# Patient Record
Sex: FEMALE | Race: WHITE
[De-identification: names, ages, dates, MRNs, and addresses within clinical notes are randomized per-mention and may not be internally consistent; named-entity substitution may affect disease eponyms.]

---

## 2019-12-08 ENCOUNTER — HOSPITAL ENCOUNTER (EMERGENCY)
Dept: HOSPITAL 97 - ER | Age: 19
Discharge: HOME | End: 2019-12-08
Payer: SELF-PAY

## 2019-12-08 VITALS — DIASTOLIC BLOOD PRESSURE: 78 MMHG | TEMPERATURE: 98.2 F | OXYGEN SATURATION: 100 % | SYSTOLIC BLOOD PRESSURE: 112 MMHG

## 2019-12-08 DIAGNOSIS — S80.861A: Primary | ICD-10-CM

## 2019-12-08 PROCEDURE — 99281 EMR DPT VST MAYX REQ PHY/QHP: CPT

## 2019-12-08 NOTE — EDPHYS
Physician Documentation                                                                           

 [facility]                                                                 

Name: Rose Villar                                                                              

Age: 19 yrs                                                                                       

Sex: Female                                                                                       

: 2000                                                                                   

MRN: [id number]                                                                                   

Arrival Date: 2019                                                                          

Time: 15:38                                                                                       

Account#: [id number]                                                                            

Bed DIS1                                                                                          

Private MD:                                                                                       

ED Physician Da Coronado                                                                       

HPI:                                                                                              

                                                                                             

16:26 This 19 yrs old  Female presents to ER via Ambulatory with complaints of       pm1 

      Insect Bite.                                                                                

16:26 The patient's rash thought to be caused by insect bites. The rash is located on the     pm1 

      right calf. The rash can be described as raised. Onset: The symptoms/episode                

      began/occurred just prior to arrival. Associated signs and symptoms: Pertinent              

      positives: burning sensation, Pain Pertinent negatives: difficulty breathing, fever,        

      itching, nausea, swelling of lips, swelling of throat, swelling of tongue, vomiting,        

      wheezing. Severity of symptoms: in the emergency department the symptoms have improved      

      markedly. Treatment given at home: None. The patient has not experienced similar            

      symptoms in the past. It is unknown whether or not the patient has recently seen a          

      physician. Presenting with friend that was also bitten by asp.                              

                                                                                                  

OB/GYN:                                                                                           

16:00 LMP N/A -                                                                               iw  

                                                                                                  

Historical:                                                                                       

- Allergies:                                                                                      

15:53 No Known Allergies;                                                                     sg  

- Home Meds:                                                                                      

15:53 None [Active];                                                                          sg  

- PMHx:                                                                                           

15:53 None;                                                                                   sg  

- PSHx:                                                                                           

15:53 None;                                                                                   sg  

                                                                                                  

- Immunization history:: Adult Immunizations up to date.                                          

- Social history:: Smoking status: Patient/guardian denies using tobacco.                         

- Ebola Screening: : Patient negative for fever greater than or equal to 101.5 degrees            

  Fahrenheit, and additional compatible Ebola Virus Disease symptoms Patient denies               

  exposure to infectious person Patient denies travel to an Ebola-affected area in the            

  21 days before illness onset No symptoms or risks identified at this time.                      

                                                                                                  

                                                                                                  

ROS:                                                                                              

16:26 Constitutional: Negative for fever, chills, and weight loss, Eyes: Negative for injury, pm1 

      pain, redness, and discharge, ENT: Negative for injury, pain, and discharge, Neck:          

      Negative for injury, pain, and swelling, Cardiovascular: Negative for chest pain,           

      palpitations, and edema, Respiratory: Negative for shortness of breath, cough,              

      wheezing, and pleuritic chest pain, Abdomen/GI: Negative for abdominal pain, nausea,        

      vomiting, diarrhea, and constipation, Back: Negative for injury and pain, MS/Extremity:     

      Negative for injury and deformity.                                                          

16:26 Neuro: Negative for headache, weakness, numbness, tingling, and seizure.                    

16:26 Skin: Positive for rash, of the right calf, Negative for cellulitis.                        

                                                                                                  

Exam:                                                                                             

16:26 Constitutional:  This is a well developed, well nourished patient who is awake, alert,  pm1 

      and in no acute distress. Head/Face:  Normocephalic, atraumatic. Eyes:  Pupils equal        

      round and reactive to light, extra-ocular motions intact.  Lids and lashes normal.          

      Conjunctiva and sclera are non-icteric and not injected.  Cornea within normal limits.      

      Periorbital areas with no swelling, redness, or edema. ENT:  Nares patent. No nasal         

      discharge, no septal abnormalities noted.  Tympanic membranes are normal and external       

      auditory canals are clear.  Oropharynx with no redness, swelling, or masses, exudates,      

      or evidence of obstruction, uvula midline.  Mucous membranes moist. Neck:  Trachea          

      midline, no thyromegaly or masses palpated, and no cervical lymphadenopathy.  Supple,       

      full range of motion without nuchal rigidity, or vertebral point tenderness.  No            

      Meningismus. Chest/axilla:  Normal chest wall appearance and motion.  Nontender with no     

      deformity.  No lesions are appreciated. Cardiovascular:  Regular rate and rhythm with a     

      normal S1 and S2.  No gallops, murmurs, or rubs.  Normal PMI, no JVD.  No pulse             

      deficits. Respiratory:  Lungs have equal breath sounds bilaterally, clear to                

      auscultation and percussion.  No rales, rhonchi or wheezes noted.  No increased work of     

      breathing, no retractions or nasal flaring. Back:  No spinal tenderness.  No                

      costovertebral tenderness.  Full range of motion.                                           

16:26 MS/ Extremity:  Pulses equal, no cyanosis.  Neurovascular intact.  Full, normal range       

      of motion.                                                                                  

16:26 Skin: Appearance: normal except for affected area, lesion(s), probable a wheal is           

      noted, located on the  right calf.                                                          

16:26 Neuro: Orientation: is normal, Motor: is normal, moves all fours.                           

                                                                                                  

Vital Signs:                                                                                      

15:53  / 78; Pulse 78; Resp 18; Temp 98.2; Pulse Ox 100% ; Pain /10;                   sg  

                                                                                                  

MDM:                                                                                              

16:22 Patient medically screened.                                                             pm1 

16:26 Data reviewed: vital signs. Data interpreted: Pulse oximetry: on room air is 100 %.     pm1 

      Interpretation: normal. Counseling: I had a detailed discussion with the patient and/or     

      guardian regarding: the historical points, exam findings, and any diagnostic results        

      supporting the discharge/admit diagnosis, to return to the emergency department if          

      symptoms worsen or persist or if there are any questions or concerns that arise at home.    

                                                                                                  

Administered Medications:                                                                         

No medications were administered                                                                  

                                                                                                  

                                                                                                  

Disposition:                                                                                      

                                                                                             

09:07 Co-signature as Attending Physician, Da Coronado MD I agree with the assessment and   kdr 

      plan of care.                                                                               

                                                                                                  

Disposition:                                                                                      

19 16:27 Discharged to Home. Impression: Insect bite (nonvenomous), right lower leg.        

- Condition is Stable.                                                                            

- Discharge Instructions: Insect Bite.                                                            

                                                                                                  

- Medication Reconciliation Form, Thank You Letter, Antibiotic Education, Prescription            

  Opioid Use form.                                                                                

- Follow up: Emergency Department; When: As needed; Reason: Worsening of condition.               

  Follow up: Private Physician; When: 2 - 3 days; Reason: Recheck today's complaints,             

  Continuance of care, Re-evaluation by your physician.                                           

- Problem is new.                                                                                 

- Symptoms have improved.                                                                         

                                                                                                  

                                                                                                  

                                                                                                  

Signatures:                                                                                       

Bob Manzano RN RN   sg                                                   

Da Coronado MD MD   kdr                                                  

Claudia López RN                     RN   iw                                                   

Jarett Pelletier NP                    NP   pm1                                                  

                                                                                                  

Corrections: (The following items were deleted from the chart)                                    

                                                                                             

16:33 16:27 2019 16:27 Discharged to Home. Impression: Insect bite (nonvenomous), right iw  

      lower leg. Condition is Stable. Forms are Medication Reconciliation Form, Thank You         

      Letter, Antibiotic Education, Prescription Opioid Use. Follow up: Emergency Department;     

      When: As needed; Reason: Worsening of condition. Follow up: Private Physician; When: 2      

      - 3 days; Reason: Recheck today's complaints, Continuance of care, Re-evaluation by         

      your physician. Problem is new. Symptoms have improved. pm1                                 

                                                                                                  

**************************************************************************************************

## 2019-12-08 NOTE — ER
Nurse's Notes                                                                                     

 [facility]                                                                 

Name: Rose Villar                                                                              

Age: 19 yrs                                                                                       

Sex: Female                                                                                       

: 2000                                                                                   

MRN: [id number]                                                                                   

Arrival Date: 2019                                                                          

Time: 15:38                                                                                       

Account#: [id number]                                                                            

Bed DIS1                                                                                          

Private MD:                                                                                       

Diagnosis: Insect bite (nonvenomous), right lower leg                                             

                                                                                                  

Presentation:                                                                                     

                                                                                             

15:38 Presenting complaint: Patient states: I think I was stung by an Asp too, I have pain    sg  

      and burning but I did not see the bug. Transition of care: patient was not received         

      from another setting of care. Onset of symptoms was 2019. Risk Assessment:     

      Do you want to hurt yourself or someone else? Patient reports no desire to harm self or     

      others. Initial Sepsis Screen: Does the patient meet any 2 criteria? No. Patient's          

      initial sepsis screen is negative. Does the patient have a suspected source of              

      infection? No. Patient's initial sepsis screen is negative. Care prior to arrival: None.    

15:38 Method Of Arrival: Ambulatory                                                           sg  

15:38 Acuity: BETTY 4                                                                           sg  

15:54 Note pain to right calf.                                                                sg  

                                                                                                  

OB/GYN:                                                                                           

16:00 LMP N/A -                                                                               iw  

                                                                                                  

Historical:                                                                                       

- Allergies:                                                                                      

15:53 No Known Allergies;                                                                     sg  

- Home Meds:                                                                                      

15:53 None [Active];                                                                          sg  

- PMHx:                                                                                           

15:53 None;                                                                                   sg  

- PSHx:                                                                                           

15:53 None;                                                                                   sg  

                                                                                                  

- Immunization history:: Adult Immunizations up to date.                                          

- Social history:: Smoking status: Patient/guardian denies using tobacco.                         

- Ebola Screening: : Patient negative for fever greater than or equal to 101.5 degrees            

  Fahrenheit, and additional compatible Ebola Virus Disease symptoms Patient denies               

  exposure to infectious person Patient denies travel to an Ebola-affected area in the            

  21 days before illness onset No symptoms or risks identified at this time.                      

                                                                                                  

                                                                                                  

Screenin:10 Abuse screen: Denies threats or abuse. Denies injuries from another. Nutritional        iw  

      screening: No deficits noted. Tuberculosis screening: No symptoms or risk factors           

      identified. Fall Risk None identified.                                                      

                                                                                                  

Assessment:                                                                                       

16:10 General: Appears in no apparent distress. Behavior is calm, cooperative. Pain: Denies   iw  

      pain. Neuro: Level of Consciousness is awake, alert, obeys commands, Oriented to            

      person, place, time, situation, Moves all extremities. Full function. Cardiovascular:       

      Patient's skin is warm and dry. Respiratory: Respiratory effort is even, unlabored,         

      Respiratory pattern is regular, symmetrical. Derm: Skin is intact, is healthy with good     

      turgor, Skin is pink, warm \T\ dry. normal. Musculoskeletal: Range of motion: intact in     

      all extremities.                                                                            

                                                                                                  

Vital Signs:                                                                                      

15:53  / 78; Pulse 78; Resp 18; Temp 98.2; Pulse Ox 100% ; Pain 4/10;                   sg  

                                                                                                  

ED Course:                                                                                        

15:38 Patient arrived in ED.                                                                  mr  

15:39 Triage completed.                                                                       sg  

15:39 Arm band placed on.                                                                     sg  

15:40 Patient has correct armband on for positive identification.                             iw  

15:51 Claudia López RN is Primary Nurse.                                                     

15:53 Wicho Orosco PA is PHCP.                                                              [facility] 

15:53 Da Coronado MD is Attending Physician.                                              [facility] 

16:10 No provider procedures requiring assistance completed. Patient did not have IV access   iw  

      during this emergency room visit.                                                           

16:11 PHCP role handed off by Wicho Orosco PA                                               pm1 

16:11 Jarett Pelletier NP is PHCP.                                                           pm1 

                                                                                                  

Administered Medications:                                                                         

No medications were administered                                                                  

                                                                                                  

                                                                                                  

Outcome:                                                                                          

16:27 Discharge ordered by MD.                                                                pm1 

16:32 Discharged to home ambulatory, with family.                                             iw  

16:32 Condition: good                                                                             

16:32 Discharge instructions given to patient, Instructed on discharge instructions, follow       

      up and referral plans. Demonstrated understanding of instructions, follow-up care.          

16:33 Patient left the ED.                                                                    iw  

                                                                                                  

Signatures:                                                                                       

Bob Manzano RN RN                                                      

Wicho Orosco PA PA   [facility]                                                  

LoraMeghan                                                                                    

Claudia López RN RN                                                      

Jarett Pelletier NP                    NP   pm1                                                  

                                                                                                  

**************************************************************************************************

## 2020-02-05 ENCOUNTER — HOSPITAL ENCOUNTER (EMERGENCY)
Dept: HOSPITAL 97 - ER | Age: 20
LOS: 1 days | Discharge: HOME | End: 2020-02-06
Payer: SELF-PAY

## 2020-02-05 DIAGNOSIS — N92.6: Primary | ICD-10-CM

## 2020-02-05 PROCEDURE — 99283 EMERGENCY DEPT VISIT LOW MDM: CPT

## 2020-02-05 PROCEDURE — 36415 COLL VENOUS BLD VENIPUNCTURE: CPT

## 2020-02-05 PROCEDURE — 84702 CHORIONIC GONADOTROPIN TEST: CPT

## 2020-02-05 PROCEDURE — 96375 TX/PRO/DX INJ NEW DRUG ADDON: CPT

## 2020-02-05 PROCEDURE — 86901 BLOOD TYPING SEROLOGIC RH(D): CPT

## 2020-02-05 PROCEDURE — 81003 URINALYSIS AUTO W/O SCOPE: CPT

## 2020-02-05 PROCEDURE — 80048 BASIC METABOLIC PNL TOTAL CA: CPT

## 2020-02-05 PROCEDURE — 86900 BLOOD TYPING SEROLOGIC ABO: CPT

## 2020-02-05 PROCEDURE — 85025 COMPLETE CBC W/AUTO DIFF WBC: CPT

## 2020-02-05 PROCEDURE — 81025 URINE PREGNANCY TEST: CPT

## 2020-02-05 PROCEDURE — 96374 THER/PROPH/DIAG INJ IV PUSH: CPT

## 2020-02-05 NOTE — XMS REPORT
Patient Summary Document

 Created on:2020



Patient:TOBIN KOHLI

Sex:Female

:2000

External Reference #:[id number]





Demographics







 Address  [address]



   [place] [address]

 

 Home Phone  [phone]

 

 Work Phone  [phone]

 

 Email Address  [email or web address]

 

 Preferred Language  Unknown

 

 Marital Status  Unknown

 

 Taoism Affiliation  Unknown

 

 Race  Unknown

 

 Additional Race(s)  Unavailable

 

 Ethnic Group  Unknown









Author







 [facility]

 

 Address  [address]



   [place] [address]

 

 Phone  [phone]









Care Team Providers







 Name  Role  Phone

 

 UNKNOWN, REFFERING  Primary Care Provider  Unavailable

 

 SHEREE FIELDS  Unavailable  Unavailable









Problems

This patient has no known problems.



Allergies, Adverse Reactions, Alerts

This patient has no known allergies or adverse reactions.



Medications

This patient has no known medications.



Encounters







 Start  End  Encounter  Admission  Attending  Care  Care  Encounter



 Date/Time  Date/Time  Type  Type  Clinicians  Facility  Department  ID

 

 2019  Emergency      [facility]  MED  [id number]



 20:42:00  20:42:00            

 

 2019  Emergency      [facility]  MED  [id number]



 14:50:00  14:50:00            

 

 2018  Emergency      [facility]  MED  [id number]



 13:39:00  13:39:00            

 

 2017  Emergency  E    [facility]  MED  [id number]



 12:25:00  12:25:00            

 

 2013  Outpatient      [facility]  [id number]



 07:35:53  07:35:53            







Results







 Test Description  Test Time  Test Comments  Text Results  Atomic Results  
Result Comments









 CT Abdomen and Pelvis  2018 10:21:19    Patient:   TOBIN KOHLI  



 w/ Contrast                           MRN:  



       [id number] Date/Time2018 10:00  



       CDTReason for ExamAbdominal  



       painReportCT OF THE ABDOMEN AND PELVIS  



       WITH  CONTRASTLocation R 16HISTORY:  



       Abdominal painTECHNIQUE:5 millimeters  



       contrast  enhanced axial images of the  



       abdomen and pelvis provided in venous  



       delays.  No PO contrast was  



       administered.   The images were  



       reviewed in soft tissue, lung and bone  



       windows. Sagittal and coronal images  



       were reformatted. One or more the  



       following dose reduction techniques is  



       utilized: Use of iterative  



       reconstruction, automated exposure  



       control, adjustment of the mAs and Kv  



       for the patient's weight. DLP  



       514.1mGy-cm.  Estimated dose savings  



       29%.COMPARISON:  None.CT abdomen and  



       pelvis dated 2018FINDINGS:Lung  



       Bases:  No significant  



       abnormality.Abdomen findings:Liver: No  



       significant abnormality.Gallbladder:  



       No significant abnormality.Pancreas:  



       No significant abnormality.Spleen: No  



       significant abnormality.Kidneys: No  



       significant abnormality.Adrenals: No  



       significant abnormality.Bowel: No  



       significant abnormality. No dilated  



       bowel loops or areas of bowel wall  



       thickening.Appendix: Well identified  



       and normal.Pelvis findings:Bladder: No  



       significant abnormality.Uterus: No  



       significant abnormality.Adnexal  



       regions: No significant  



       abnormality.Osseous: Note of a  



       partially sacralized L5 on the right.  



       Osseous structures are otherwise  



       unremarkable. Vascular: Vascular  



       structures enhance normally.Lymph  



       nodes: No evidence of lymphadenopathy  



       in the abdomen and  



       pelvis.IMPRESSION:Exam  



       Date/Time2018 10:00 CDTReportNo  



       acute intra-abdominal findings. Normal  



       right lower quadrant appendix. No free  



       air or free fluid.***** Final  



       *****Dictated by:    MD Ofelia, Guadalupe FDictated DT/TM: 2018  



       10:16 amSigned by:  MD Gilbert Eniola FSigned (Electronic  



       Signature):  2018 10:21 am  

 

 37027&PELVIS W/CONTRAST  2017 06:43:42    CT OF THE ABDOMEN AND PELVIS 
WITH  



       CONTRASTHISTORY: PainTECHNIQUE:5  



       millimeters contrast  enhanced axial  



       images of the abdomen and pelviswere  



       performed with     venous delays. The  



       images were reviewed in softtissue,  



       lung and bone windows. 2 mm coronal  



       images were reformatted. 100mL of  



       Isovue 370 is given IV. The GFR is  



       greater than 60COMPARISON:  CT abdomen  



       pelvis, 2017FINDINGS:Abdomen  



       findings:     The liver, adrenals,  



       spleen, pancreas, gallbladder, kidneys  



       and lungwindows are  



       unremarkable.Pelvis findings:    The  



       distal ureters, bladder   , uterus,  



       adnexa , appendix andremainder of the  



       bowel are unremarkable. Bone windows  



       are normal.IMPRESSION:Unremarkable  



       exam. No acute findings.  









 Urinalysis Complete  2017 05:48:00    









   Test Item  Value  Reference Range  Comments









 Color (test code=COLOR)  Ashley  Yellow,Straw,Pl yellow  

 

 Clarity (test code=CLAR)  Clear  Clear  

 

 Specific Gravity (test code=SPGR)  1.032  1.001-1.035  

 

 pH (test code=PH)  5.0  5.0-9.0  

 

 Ketone (test code=KET)  5 mg/dL  Negative  

 

 Glucose (test code=GLUCUR)  Negative mg/dL  Negative  

 

 Protein (test code=PROT)  75 mg/dL  Negative  

 

 Bilirubin (test code=BILI)  See IctoTest mg/dL  Negative  

 

 Occult Blood (test code=UDOB)  Large  Negative  

 

 Urobilinogen (test code=UROB)  1.0 mg/dL  0.2-1.0  

 

 Nitrite (test code=NIT)  Negative  Negative  

 

 Leuk Esterase (test code=LEUK)  Small  Negative  

 

 Ictotest (test code=ICTOTEST)  Confirmed Negative  Negative,Confirmed Negative
  

 

 Micros Exam (test code=MEXAM)  Indicated    

 

 Epithelial Cells (test code=EPI)  15-19 /LPF  0-30  

 

 WBC, Urine (test code=UWBC)  2-5 /HPF  0-5  

 

 RBC, Urine (test code=URBC)  None Seen /HPF  0-5  

 

 Bacteria (test code=BACT)  Few /HPF    



CBC with [id number] 05:42:00





 Test Item  Value  Reference Range  Comments

 

 WBC (test code=WBC)  7.1 K/cumm  4.4-10.5  

 

 RBC (test code=RBC)  4.36 M/cumm  3.75-5.20  

 

 Hemoglobin (test code=HGB)  12.8 gm/dL  12.2-14.8  

 

 Hematocrit (test code=HCT)  38.2 %  36.5-44.4  

 

 MCV (test code=MCV)  87.7 fL    

 

 MCH (test code=MCH)  29.4 pg  27.0-32.5  

 

 MCHC (test code=MCHC)  33.5 g/dL  32.0-37.5  

 

 RDW (test code=RDW)  14.6 %  11.5-14.5  

 

 Platelet Count (test code=PLTCT)  269 K/cumm  140-440  

 

 MPV (test code=MPV)  8.4 fL    

 

 Diff Method (test code=DIFFM)  Auto    

 

 Neutrophil (test code=NEUT)  51.1 %  36-70  

 

 Lymphocyte (test code=LYMPH)  36.0 %  12-44  

 

 Monocyte (test code=MONO)  9.0 %  0-11  

 

 Eosinophil (test code=EOS)  3.3 %  0-7  

 

 Basophil (test code=BASO)  0.5 %  0-2  

 

 Neutro Abs (test code=ANEUT)  3.6 K/cumm  1.6-7.4  

 

 Lymph Abs (test code=ALYMPH)  2.6 K/cumm  0.5-4.6  

 

 Mono Abs (test code=AMONO)  0.6 K/cumm  0.0-1.2  

 

 Eos Abs (test code=AEOS)  0.24 K/cumm  0.00-0.74  

 

 Baso Abs (test code=ABASO)  0.0 K/cumm  0.00-0.21  



BHCG, Serum, [id number] 05:28:00





 Test Item  Value  Reference Range  Comments

 

 Preg Qual [Se] (test code=BSHCG)  Negative  Negative  



Comprehensive Metabolic [id number] 05:28:00





 Test Item  Value  Reference Range  Comments

 

 Sodium (test code=NA)  141 mmol/L  135-145  

 

 Potassium (test code=K)  3.8 mmol/L  3.5-5.1  

 

 Chloride (test code=CL)  104 mmol/L    

 

 Carbon Dioxide (test  22 mmol/L  22-29  



 code=CO2)      

 

 Glucose (test code=GLU)  86 mg/dL    

 

 Blood Urea Nitrogen  17 mg/dL  6-20  



 (test code=BUN)      

 

 Creatinine (test  0.9 mg/dL  0.5-0.9  



 code=CREAT)      

 

 Calcium (test code=CA)  9.2 mg/dL  8.3-10.5  

 

 Prot Total (test  7.6 g/dL  6.4-8.3  



 code=TP)      

 

 Albumin (test code=ALB)  4.4 g/dL  3.5-5.2  

 

 A/G Ratio (test  1.4 Ratio    



 code=AGRATIO)      

 

 Globulin (test  3.2  2.9-3.1  



 code=GLOB)      

 

 Bili Total (test  0.5 mg/dL  0.1-0.9  



 code=TBIL)      

 

 Alk Phos (test  48 U/L    



 code=APHOS)      

 

 AST (test code=AST)  17 U/L  1-32  

 

 ALT (test code=ALT)  15 U/L  1-33  

 

 BUN/Creatinine Ratio  18.9    



 (test code=BCRATIO)      

 

 Anion Gap (test  15 mmol/L  7-16  



 code=AGAP)      

 

 Estimated GFR (test  >60 mL/min/1.73m2    eGFR (estimated Glomerular



 code=GFR)      Filtration Rate) is an



       estimated value,calculated



       from the patient's serum



       creatinine using the MDRD



       equation.It is NOT the



       patient's actual GFR. The



       eGFR provides a more



       clinicallyuseful measure of



       kidney disease than serum



       creatinine alone.***This



       calculation takes sex and



       race into account, if the



       informationis provided. If



       the race is not provided,



       and the patient



       isAfrican-American,



       multiply by 1.212. If sex



       is not provided, and



       thepatient is female,



       multiply by 0.742. Results



       for patients <18 years



       ofage have not been



       validated by the MDRD study



       and should be



       interpretedwith



       caution.eGFR Result



       Interpretation:eGFR > or=60



       is in the Normal RangeeGFR



       < 60 may mean kidney



       diseaseeGFR < 15 may mean



       kidney failure***Ranges



       recommended by the National



       Kidney



       Foundation,http://nkdep.nih



       .gov



[id number] 05:24:00





 Test Item  Value  Reference Range  Comments

 

 Lipase (test code=LIP)  25 U/L  13-60  



[id number]&amp; PELVIS W/O [id number] 20:05:31LOCATION: S 17HISTORY:  16-year
-old female who presents with abdominal pain.COMMENT:     Axial CT imaging of 
this patient's abdomen and pelvis was obtained fromthe diaphragm to the pelvic 
floor withoutIV contrast. Coronal andsagittal soft tissue reconstructions were 
included.    One or more of the following dose reduction techniques were used:
Automated exposure control, adjustment of the mA and/or kV according thepatient 
size, and/or utilization of iterative reconstruction technique.DLP: 851.9 mGy-
cmCONTRAST: NoneFINDINGS:The visualized lung bases are clear. The cardiac 
silhouette isunremarkable.The liver, spleen, pancreas, adrenal glands, kidneys, 
and gallbladderare unremarkable in this unenhanced CT study.The upper 
intestinal tract down the small intestine are unremarkable. Anormal-appearing 
appendix is seen. The colon is unremarkable.There is no ascites or adenopathy 
present in the abdomen or in thepelvis.In the pelvis the left ovary is enlarged 
and cystic measuring 5 cm indiameter. The uterus and right ovary are 
unremarkable and the urinarybladder is unremarkable.The vascular anatomy is 
unremarkable.The musculoskeletal anatomy is unremarkable.IMPRESSION:This patient
's left ovary is enlarged and cystic. Pelvic ultrasoundcorrelation is 
suggested.Otherwise the appearance of this patient's abdomen and pelvis in 
thisunenhanced CT study is unremarkable.Urinalysis [id number] 19:34:00





 Test Item  Value  Reference Range  Comments

 

 Color (test code=COLOR)  Yellow  Yellow,Straw,Pl yellow  

 

 Clarity (test code=CLAR)  Clear  Clear  

 

 Specific Gravity (test code=SPGR)  1.023  1.001-1.035  

 

 pH (test code=PH)  8.0  5.0-9.0  

 

 Ketone (test code=KET)  15 mg/dL  Negative  

 

 Glucose (test code=GLUCUR)  Negative mg/dL  Negative  

 

 Protein (test code=PROT)  Negative mg/dL  Negative  

 

 Bilirubin (test code=BILI)  Negative mg/dL  Negative  

 

 Occult Blood (test code=UDOB)  Negative  Negative  

 

 Urobilinogen (test code=UROB)  1.0 mg/dL  0.2-1.0  

 

 Nitrite (test code=NIT)  Negative  Negative  

 

 Leuk Esterase (test code=LEUK)  Small  Negative  

 

 Micros Exam (test code=MEXAM)  Indicated    

 

 Epithelial Cells (test code=EPI)  10-14 /LPF  0-30  

 

 WBC, Urine (test code=UWBC)  0-1 /HPF  0-5  

 

 RBC, Urine (test code=URBC)  None Seen /HPF  0-5  

 

 Bacteria (test code=BACT)  Few /HPF    



Comprehensive Metabolic [id number] 19:16:00





 Test Item  Value  Reference Range  Comments

 

 Sodium (test code=NA)  138 mmol/L  135-145  

 

 Potassium (test code=K)  4.4 mmol/L  3.5-5.1  HEMOLYZED

 

 Chloride (test code=CL)  105 mmol/L    

 

 Carbon Dioxide (test  18 mmol/L  22-29  



 code=CO2)      

 

 Glucose (test code=GLU)  78 mg/dL    

 

 Blood Urea Nitrogen  11 mg/dL  6-20  



 (test code=BUN)      

 

 Creatinine (test  0.7 mg/dL  0.5-0.9  



 code=CREAT)      

 

 Calcium (test code=CA)  8.8 mg/dL  8.3-10.5  

 

 Prot Total (test  6.9 g/dL  6.4-8.3  



 code=TP)      

 

 Albumin (test code=ALB)  4.2 g/dL  3.5-5.2  

 

 A/G Ratio (test  1.6 Ratio    



 code=AGRATIO)      

 

 Globulin (test  2.7  2.9-3.1  



 code=GLOB)      

 

 Bili Total (test  0.5 mg/dL  0.1-0.9  



 code=TBIL)      

 

 Alk Phos (test  41 U/L    



 code=APHOS)      

 

 AST (test code=AST)  33 U/L  1-32  HEMOLYZED

 

 ALT (test code=ALT)  20 U/L  1-33  

 

 BUN/Creatinine Ratio  15.7    



 (test code=BCRATIO)      

 

 Anion Gap (test  15 mmol/L  7-16  



 code=AGAP)      

 

 Estimated GFR (test  >60 mL/min/1.73m2    eGFR (estimated Glomerular



 code=GFR)      Filtration Rate) is an



       estimated value,calculated



       from the patient's serum



       creatinine using the MDRD



       equation.It is NOT the



       patient's actual GFR. The



       eGFR provides a more



       clinicallyuseful measure of



       kidney disease than serum



       creatinine alone.***This



       calculation takes sex and



       race into account, if the



       informationis provided. If



       the race is not provided,



       and the patient



       isAfrican-American,



       multiply by 1.212. If sex



       is not provided, and



       thepatient is female,



       multiply by 0.742. Results



       for patients <18 years



       ofage have not been



       validated by the MDRD study



       and should be



       interpretedwith



       caution.eGFR Result



       Interpretation:eGFR > or=60



       is in the Normal RangeeGFR



       < 60 may mean kidney



       diseaseeGFR < 15 may mean



       kidney failure***Ranges



       recommended by the National



       Kidney



       Foundation,http://nkdep.nih



       .gov



[id number] 19:16:00





 Test Item  Value  Reference Range  Comments

 

 Lipase (test code=LIP)  24 U/L  13-60  



BHCG, Serum, [id number] 19:10:00





 Test Item  Value  Reference Range  Comments

 

 Preg Qual [Se] (test code=BSHCG)  Negative  Negative  



CBC with [id number] 18:53:00





 Test Item  Value  Reference Range  Comments

 

 WBC (test code=WBC)  8.1 K/cumm  4.4-10.5  

 

 RBC (test code=RBC)  4.00 M/cumm  3.75-5.20  

 

 Hemoglobin (test code=HGB)  12.3 gm/dL  12.2-14.8  

 

 Hematocrit (test code=HCT)  34.4 %  36.5-44.4  

 

 MCV (test code=MCV)  86.1 fL    

 

 MCH (test code=MCH)  30.7 pg  27.0-32.5  

 

 MCHC (test code=MCHC)  35.6 g/dL  32.0-37.5  

 

 RDW (test code=RDW)  12.8 %  11.5-14.5  

 

 Platelet Count (test code=PLTCT)  184 K/cumm  140-440  

 

 MPV (test code=MPV)  7.8 fL    

 

 Diff Method (test code=DIFFM)  Auto    

 

 Neutrophil (test code=NEUT)  70.5 %  36-70  

 

 Lymphocyte (test code=LYMPH)  22.6 %  12-44  

 

 Monocyte (test code=MONO)  4.2 %  0-11  

 

 Eosinophil (test code=EOS)  2.4 %  0-7  

 

 Basophil (test code=BASO)  0.3 %  0-2  

 

 Neutro Abs (test code=ANEUT)  5.7 K/cumm  1.6-7.4  

 

 Lymph Abs (test code=ALYMPH)  1.8 K/cumm  0.5-4.6  

 

 Mono Abs (test code=AMONO)  0.3 K/cumm  0.0-1.2  

 

 Eos Abs (test code=AEOS)  0.19 K/cumm  0.00-0.74  

 

 Baso Abs (test code=ABASO)  0.0 K/cumm  0.00-0.21  



Urinalysis [id number] 18:49:00





 Test Item  Value  Reference Range  Comments

 

 Color (test code=COLOR)  Ashley  Yellow,Straw,Pl yellow  

 

 Clarity (test code=CLAR)  Sl Cloudy  Clear  

 

 Specific Gravity (test  1.012  1.001-1.035  



 code=SPGR)      

 

 pH (test code=PH)  5.0  5.0-9.0  

 

 Ketone (test code=KET)  Negative mg/dL  Negative  

 

 Glucose (test code=GLUCUR)  Negative mg/dL  Negative  

 

 Protein (test code=PROT)  75 mg/dL  Negative  

 

 Bilirubin (test code=BILI)  See IctoTest mg/dL  Negative  

 

 Occult Blood (test code=UDOB)  Large  Negative  

 

 Urobilinogen (test code=UROB)  1.0 mg/dL  0.2-1.0  

 

 Nitrite (test code=NIT)  Positive  Negative  

 

 Leuk Esterase (test code=LEUK)  Large  Negative  

 

 Ictotest (test code=ICTOTEST)  Confirmed Negative  Negative,Confirmed Negative
  

 

 Micros Exam (test code=MEXAM)  Indicated    

 

 Epithelial Cells (test  3-5 /LPF  0-30  



 code=EPI)      

 

 WBC, Urine (test code=UWBC)  31-40 /HPF  0-5  

 

 RBC, Urine (test code=URBC)  3-5 /HPF  0-5  

 

 Bacteria (test code=BACT)  Few /HPF    



BHCG, Urine, [id number] 18:48:00





 Test Item  Value  Reference Range  Comments

 

 Preg Qual [Ur] (test code=HUHCG)  Negative  Negative

## 2020-02-06 VITALS — TEMPERATURE: 98 F | OXYGEN SATURATION: 99 %

## 2020-02-06 VITALS — DIASTOLIC BLOOD PRESSURE: 60 MMHG | SYSTOLIC BLOOD PRESSURE: 118 MMHG

## 2020-02-06 LAB
BUN BLD-MCNC: 18 MG/DL (ref 7–18)
GLUCOSE SERPLBLD-MCNC: 89 MG/DL (ref 74–106)
HCG SERPL-ACNC: < 1 MIU/ML (ref 1–3)
HCT VFR BLD CALC: 37 % (ref 36–45)
LYMPHOCYTES # SPEC AUTO: 2.8 K/UL (ref 0.7–4.9)
PMV BLD: 9 FL (ref 7.6–11.3)
POTASSIUM SERPL-SCNC: 4.2 MMOL/L (ref 3.5–5.1)
RBC # BLD: 4.11 M/UL (ref 3.86–4.86)

## 2020-02-06 NOTE — EDPHYS
Physician Documentation                                                                           

 [facility]                                                                 

Name: Rose Villar                                                                              

Age: 19 yrs                                                                                       

Sex: Female                                                                                       

: 2000                                                                                   

MRN: [id number]                                                                                   

Arrival Date: 2020                                                                          

Time: 23:54                                                                                       

Account#: [id number]                                                                            

Bed 14                                                                                            

Private MD:                                                                                       

ED Physician Bobby Castillo                                                                     

HPI:                                                                                              

                                                                                             

00:07 This 19 yrs old  Female presents to ER via Un[facility] with complaints of       kb  

      Vaginal Bleeding.                                                                           

00:07 The patient presents to the emergency department with abdominal pain, of the suprapubic kb  

      area, that started 30 minute(s) ago, vaginal bleeding, that is moderate. Pregnancy          

      course: Prenatal care: none, Leakage of Fluid: none appreciated, Ultrasound: the            

      patient has not had an ultrasound, Risk/complications: no obvious risks or                  

      complications are appreciated. Previous pregnancies: in previous pregnancies patient        

      has had. Associated signs and symptoms: Pertinent positives: abdominal pain, vaginal        

      bleeding, Pertinent negatives: chest pain, diarrhea, dysuria, fever, frequency, nausea,     

      ruptured membranes, seizure, shortness of breath, vaginal discharge, vomiting. The          

      patient has not experienced similar symptoms in the past. The patient has not recently      

      seen a physician.                                                                           

                                                                                                  

OB/GYN:                                                                                           

                                                                                             

23:55 LMP 2019                                                                             fc  

                                                                                             

00:07  4,  3, Living 0, LMP 2019                                            kb  

                                                                                                  

Historical:                                                                                       

- Allergies:                                                                                      

00:12 No Known Allergies;                                                                     fc  

- Home Meds:                                                                                      

00:12 None [Active];                                                                          fc  

- PMHx:                                                                                           

00:12 None;                                                                                   fc  

- PSHx:                                                                                           

00:12 Ear Surg;                                                                               fc  

                                                                                                  

- Immunization history:: Last tetanus immunization: unknown, Flu vaccine is not up to             

  date.                                                                                           

- Coronavirus screen:: The patient has NOT traveled to [place], [place], or [place] in the           

  past 14 days. The patient has NOT had contact with known/suspected case of                      

  Coronavirus?.                                                                                   

- Social history:: Smoking status: Patient reports the use of cigarette tobacco                   

  products, smokes one-half pack cigarettes per day, Patient/guardian denies using                

  alcohol, street drugs.                                                                          

- Ebola Screening: : Patient negative for fever greater than or equal to 101.5 degrees            

  Fahrenheit, and additional compatible Ebola Virus Disease symptoms Patient denies               

  exposure to infectious person Patient denies travel to an Ebola-affected area in the            

  21 days before illness onset.                                                                   

                                                                                                  

                                                                                                  

ROS:                                                                                              

00:07 Constitutional: Negative for fever, chills, and weight loss, ENT: Negative for injury,  kb  

      pain, and discharge, Neck: Negative for injury, pain, and swelling, Cardiovascular:         

      Negative for chest pain, palpitations, and edema, Respiratory: Negative for shortness       

      of breath, cough, wheezing, and pleuritic chest pain, Back: Negative for injury and         

      pain, MS/Extremity: Negative for injury and deformity, Skin: Negative for injury, rash,     

      and discoloration, Neuro: Negative for headache, weakness, numbness, tingling, and          

      seizure.                                                                                    

00:07 Abdomen/GI: Positive for abdominal pain.                                                    

00:07 : Positive for vaginal bleeding.                                                          

                                                                                                  

Exam:                                                                                             

00:07 Constitutional:  This is a well developed, well nourished patient who is awake, alert,  kb  

      and in no acute distress. Head/Face:  Normocephalic, atraumatic. ENT:  Nares patent. No     

      nasal discharge, no septal abnormalities noted.  Tympanic membranes are normal and          

      external auditory canals are clear.  Oropharynx with no redness, swelling, or masses,       

      exudates, or evidence of obstruction, uvula midline.  Mucous membranes moist. Neck:         

      Trachea midline, no thyromegaly or masses palpated, and no cervical lymphadenopathy.        

      Supple, full range of motion without nuchal rigidity, or vertebral point tenderness.        

      No Meningismus. Chest/axilla:  Normal chest wall appearance and motion.  Nontender with     

      no deformity.  No lesions are appreciated. Cardiovascular:  Regular rate and rhythm         

      with a normal S1 and S2.  No gallops, murmurs, or rubs.  Normal PMI, no JVD.  No pulse      

      deficits. Respiratory:  Lungs have equal breath sounds bilaterally, clear to                

      auscultation and percussion.  No rales, rhonchi or wheezes noted.  No increased work of     

      breathing, no retractions or nasal flaring. Back:  No spinal tenderness.  No                

      costovertebral tenderness.  Full range of motion. Skin:  Warm, dry with normal turgor.      

      Normal color with no rashes, no lesions, and no evidence of cellulitis. MS/ Extremity:      

      Pulses equal, no cyanosis.  Neurovascular intact.  Full, normal range of motion. Neuro:     

       Awake and alert, GCS 15, oriented to person, place, time, and situation.  Cranial          

      nerves II-XII grossly intact.  Motor strength 5/5 in all extremities.  Sensory grossly      

      intact.  Cerebellar exam normal.  Normal gait.                                              

00:07 Abdomen/GI: Inspection: abdomen appears normal, Bowel sounds: normal, in all quadrants,     

      Palpation: soft, in all quadrants, moderate abdominal tenderness, in the suprapubic         

      area.                                                                                       

                                                                                                  

Vital Signs:                                                                                      

                                                                                             

23:55 Temp 97.8; Weight 83.01 kg (R); Height 5 ft. 4 in. (162.56 cm) (R); Pain 10/10;         fc  

23:55  / 79; Pulse 61; Resp 20; Temp 97.8; Pulse Ox 99% ; Pain 10/10;                   jv1 

                                                                                             

01:00  / 75; Pulse 65; Resp 18; Temp 98; Pulse Ox 99% ; Pain 10/10;                     jv1 

02:00  / 75; Pulse 68; Resp 18; Temp 98; Pulse Ox 99% ; Pain 10/10;                     jv1 

02:30  / 60; Pulse 65; Resp 18; Temp 98; Pulse Ox 99% ; Pain 1/10;                      jv1 

                                                                                             

23:55 Body Mass Index 31.41 (83.01 kg, 162.56 cm)                                             fc  

                                                                                                  

MDM:                                                                                              

                                                                                             

23:58 Patient medically screened.                                                             kb  

                                                                                             

00:06 Data reviewed: vital signs, nurses notes. Data interpreted: Pulse oximetry: on room air kb  

      is 100 %. Interpretation: normal.                                                           

02:11 Counseling: I had a detailed discussion with the patient and/or guardian regarding: the kb  

      historical points, exam findings, and any diagnostic results supporting the                 

      discharge/admit diagnosis, lab results, the need for outpatient follow up, an OB/Gyne       

      specialist, to return to the emergency department if symptoms worsen or persist or if       

      there are any questions or concerns that arise at home.                                     

                                                                                                  

                                                                                             

00:05 Order name: Basic Metabolic Panel                                                       kb  

                                                                                             

00:05 Order name: CBC with Diff                                                               kb  

                                                                                             

00:43 Order name: Pregnancy Test, Serum                                                       kb  

                                                                                             

01:12 Order name: Urine Dipstick--Ancillary (enter results)                                   [facility] 

                                                                                             

00:05 Order name: Urine Pregnancy Test (obtain specimen); Complete Time: 00:39                kb  

                                                                                             

01:12 Order name: Urine Pregnancy--Ancillary (enter results)                                  [facility] 

                                                                                             

01:45 Order name: CBC with Automated Diff; Complete Time: 01:47                               [facility]

                                                                                             

02:03 Order name: Basic Metabolic Panel; Complete Time: 02:07                                 [facility]

                                                                                             

02:03 Order name: HCG, Quantitative; Complete Time: 02:07                                     [facility]

                                                                                             

02:24 Order name: ABO/RH typing                                                               [facility]

                                                                                             

00:05 Order name: IV Saline Lock; Complete Time: 01:22                                        kb  

                                                                                             

00:05 Order name: Labs collected and sent; Complete Time: 01:22                               kb  

                                                                                             

00:05 Order name: NPO                                                                         kb  

                                                                                             

00:05 Order name: Urine Dipstick-Ancillary (obtain specimen); Complete Time: 01:22            kb  

                                                                                                  

Administered Medications:                                                                         

02:15 Drug: Zofran 4 mg Route: IVP; Site: right antecubital;                                  jv1 

02:46 Follow up: Response: No adverse reaction                                                jv1 

02:18 Drug: morphine 4 mg {Note: rass 0.} Route: IVP; Site: right antecubital;                jv1 

02:46 Follow up: Response: No adverse reaction; Pain is decreased                             jv1 

                                                                                                  

                                                                                                  

Disposition:                                                                                      

06:14 Co-signature as Attending Physician, Bobby Castillo MD I agree with the assessment and tw4 

      plan of care.                                                                               

                                                                                                  

Disposition:                                                                                      

20 02:12 Discharged to Home. Impression: Irregular menstruation, unspecified.               

- Condition is Stable.                                                                            

- Discharge Instructions: Abnormal Uterine Bleeding, Easy-to-Read.                                

- Prescriptions for Diclofenac Sodium 75 mg Oral Tablet, Delayed Release (E.C.) - take            

  1 tablet by ORAL route 2 times per day As needed; 30 tablet.                                    

- Medication Reconciliation Form, Thank You Letter, Antibiotic Education, Prescription            

  Opioid Use form.                                                                                

- Follow up: Emergency Department; When: As needed; Reason: Worsening of condition.               

  Follow up: Private Physician; When: 2 - 3 days; Reason: Recheck today's complaints,             

  Continuance of care, Re-evaluation by your physician.                                           

                                                                                                  

                                                                                                  

                                                                                                  

Signatures:                                                                                       

Dispatcher [employer]                           [facility]                                                 

Lissy Em FNP-C FNP-Ckb Chretien, Felicia, RN                   Bobby Avila MD MD   tw4                                                  

Callie Genao RN                      RN   jv1                                                  

                                                                                                  

Corrections: (The following items were deleted from the chart)                                    

02:50 02:12 2020 02:12 Discharged to Home. Impression: Irregular menstruation,          jv1 

      unspecified. Condition is Stable. Forms are Medication Reconciliation Form, Thank You       

      Letter, Antibiotic Education, Prescription Opioid Use. Follow up: Emergency Department;     

      When: As needed; Reason: Worsening of condition. Follow up: Private Physician; When: 2      

      - 3 days; Reason: Recheck today's complaints, Continuance of care, Re-evaluation by         

      your physician. kb                                                                          

                                                                                                  

**************************************************************************************************

## 2020-02-06 NOTE — ER
Nurse's Notes                                                                                     

 [facility]                                                                 

Name: Rose Villar                                                                              

Age: 19 yrs                                                                                       

Sex: Female                                                                                       

: 2000                                                                                   

MRN: [id number]                                                                                   

Arrival Date: 2020                                                                          

Time: 23:54                                                                                       

Account#: [id number]                                                                            

Bed 14                                                                                            

Private MD:                                                                                       

Diagnosis: Irregular menstruation, unspecified                                                    

                                                                                                  

Presentation:                                                                                     

                                                                                             

23:55 Presenting complaint: Patient states: that she was sitting down watching tv and started fc  

      to have severe lower pelvic pain. Went to bathroom and when she wiped she noted blood.      

      Inserted a tampon. Transition of care: patient was not received from another setting of     

      care. Onset of symptoms was 2020 at 23:45. Risk Assessment: Do you want to     

      hurt yourself or someone else? Patient reports no desire to harm self or others.            

      Initial Sepsis Screen:. Care prior to arrival: None.                                        

23:55 Method Of Arrival: Wheelchair                                                             

23:55 Acuity: BETTY 3                                                                           fc  

                                                                                             

00:00 Initial Sepsis Screen: Does the patient meet any 2 criteria? No. Patient's initial      jv1 

      sepsis screen is negative. Does the patient have a suspected source of infection? No.       

      Patient's initial sepsis screen is negative.                                                

                                                                                                  

Triage Assessment:                                                                                

02:47 General: Behavior is calm, cooperative, appropriate for age.                            jv1 

                                                                                                  

OB/GYN:                                                                                           

                                                                                             

23:55 LMP 2019                                                                               

                                                                                             

00:07  4,  3, Living 0, LMP 2019                                            kb  

                                                                                                  

Historical:                                                                                       

- Allergies:                                                                                      

00:12 No Known Allergies;                                                                     fc  

- Home Meds:                                                                                      

00:12 None [Active];                                                                          fc  

- PMHx:                                                                                           

00:12 None;                                                                                   fc  

- PSHx:                                                                                           

00:12 Ear Surg;                                                                               fc  

                                                                                                  

- Immunization history:: Last tetanus immunization: unknown, Flu vaccine is not up to             

  date.                                                                                           

- Coronavirus screen:: The patient has NOT traveled to [place], [place], or [place] in the           

  past 14 days. The patient has NOT had contact with known/suspected case of                      

  Coronavirus?.                                                                                   

- Social history:: Smoking status: Patient reports the use of cigarette tobacco                   

  products, smokes one-half pack cigarettes per day, Patient/guardian denies using                

  alcohol, street drugs.                                                                          

- Ebola Screening: : Patient negative for fever greater than or equal to 101.5 degrees            

  Fahrenheit, and additional compatible Ebola Virus Disease symptoms Patient denies               

  exposure to infectious person Patient denies travel to an Ebola-affected area in the            

  21 days before illness onset.                                                                   

                                                                                                  

                                                                                                  

Screenin:11 Abuse screen: Denies threats or abuse. Nutritional screening: No deficits noted.        fc  

      Tuberculosis screening: No symptoms or risk factors identified. Fall Risk None              

      identified.                                                                                 

                                                                                                  

Assessment:                                                                                       

00:15 General: Appears in no apparent distress. uncomfortable, unkempt. Pain: Complains of    jv1 

      pain in suprapubic area Pain does not radiate. Pain currently is 10 out of 10 on a pain     

      scale. Quality of pain is described as aching, Pain began 3 hours ago. Neuro: Level of      

      Consciousness is awake, alert, obeys commands, Oriented to person, place, time,             

      situation. Cardiovascular: Denies chest pain, Heart tones S1 S2 Capillary refill < 3        

      seconds Pulses are all present. Respiratory: Airway is patent Breath sounds are clear       

      bilaterally. GI: Abdomen is round non-distended, Bowel sounds present X 4 quads. Abd is     

      soft and non tender. : Urine is clear, Reports vaginal bleeding that is moderate          

      flow. EENT: No signs and/or symptoms were reported regarding the EENT system. Derm:         

      Skin is intact, is healthy with good turgor.                                                

01:30 Reassessment: Patient appears in no apparent distress at this time. Patient and/or      jv1 

      family updated on plan of care and expected duration. Pain level reassessed. Patient is     

      alert, oriented x 3, equal unlabored respirations, skin warm/dry/pink. pt calm, not         

      crying anymore, conversing with family.                                                     

                                                                                                  

Vital Signs:                                                                                      

                                                                                             

23:55 Temp 97.8; Weight 83.01 kg (R); Height 5 ft. 4 in. (162.56 cm) (R); Pain 10/10;         fc  

23:55  / 79; Pulse 61; Resp 20; Temp 97.8; Pulse Ox 99% ; Pain 10/10;                   jv1 

                                                                                             

01:00  / 75; Pulse 65; Resp 18; Temp 98; Pulse Ox 99% ; Pain 10/10;                     jv1 

02:00  / 75; Pulse 68; Resp 18; Temp 98; Pulse Ox 99% ; Pain 10/10;                     jv1 

02:30  / 60; Pulse 65; Resp 18; Temp 98; Pulse Ox 99% ; Pain 1/10;                      jv1 

                                                                                             

23:55 Body Mass Index 31.41 (83.01 kg, 162.56 cm)                                               

                                                                                                  

ED Course:                                                                                        

                                                                                             

23:54 Patient arrived in ED.                                                                  ag3 

23:55 Arm band placed on Patient placed in an exam room, on a stretcher.                      fc  

23:58 Lissy Em FNP-C is [facility].                                                        kb  

23:58 Bobby Castillo MD is Attending Physician.                                            kb  

                                                                                             

00:10 Triage completed.                                                                       fc  

00:11 Patient has correct armband on for positive identification. Placed in gown. Bed in low  fc  

      position. Call light in reach. Pulse ox on. NIBP on.                                        

02:47 No provider procedures requiring assistance completed. IV discontinued, intact,         jv1 

      bleeding controlled, No redness/swelling at site. Pressure dressing applied.                

                                                                                                  

Administered Medications:                                                                         

02:15 Drug: Zofran 4 mg Route: IVP; Site: right antecubital;                                  jv1 

02:46 Follow up: Response: No adverse reaction                                                jv1 

02:18 Drug: morphine 4 mg {Note: rass 0.} Route: IVP; Site: right antecubital;                jv1 

02:46 Follow up: Response: No adverse reaction; Pain is decreased                             jv1 

                                                                                                  

                                                                                                  

Outcome:                                                                                          

02:12 Discharge ordered by MD.                                                                kb  

02:48 Discharged to home ambulatory, with family, with significant other.                     jv1 

02:48 Condition: improved                                                                         

02:48 Discharge instructions given to patient, family, significant other, Instructed on           

      discharge instructions, follow up and referral plans. medication usage, Demonstrated        

      understanding of instructions, follow-up care, medications, Prescriptions given X 1.        

02:50 Patient left the ED.                                                                    jv1 

                                                                                                  

Signatures:                                                                                       

Lissy Em FNP-C FNP-Ckb Chretien, Felicia RN                   RN                                                      

Callie Genao RN                      RN   jv1                                                  

India Stover                                 ag3                                                  

                                                                                                  

**************************************************************************************************

## 2021-12-02 ENCOUNTER — HOSPITAL ENCOUNTER (EMERGENCY)
Dept: HOSPITAL 97 - ER | Age: 21
LOS: 1 days | Discharge: HOME | End: 2021-12-03
Payer: SELF-PAY

## 2021-12-02 DIAGNOSIS — F17.210: ICD-10-CM

## 2021-12-02 DIAGNOSIS — R10.819: Primary | ICD-10-CM

## 2021-12-02 LAB — SP GR UR: 1.02 (ref 1–1.03)

## 2021-12-02 PROCEDURE — 83690 ASSAY OF LIPASE: CPT

## 2021-12-02 PROCEDURE — 80076 HEPATIC FUNCTION PANEL: CPT

## 2021-12-02 PROCEDURE — 82565 ASSAY OF CREATININE: CPT

## 2021-12-02 PROCEDURE — 74177 CT ABD & PELVIS W/CONTRAST: CPT

## 2021-12-02 PROCEDURE — 80048 BASIC METABOLIC PNL TOTAL CA: CPT

## 2021-12-02 PROCEDURE — 96374 THER/PROPH/DIAG INJ IV PUSH: CPT

## 2021-12-02 PROCEDURE — 81003 URINALYSIS AUTO W/O SCOPE: CPT

## 2021-12-02 PROCEDURE — 87088 URINE BACTERIA CULTURE: CPT

## 2021-12-02 PROCEDURE — 36415 COLL VENOUS BLD VENIPUNCTURE: CPT

## 2021-12-02 PROCEDURE — 96361 HYDRATE IV INFUSION ADD-ON: CPT

## 2021-12-02 PROCEDURE — 99284 EMERGENCY DEPT VISIT MOD MDM: CPT

## 2021-12-02 PROCEDURE — 87086 URINE CULTURE/COLONY COUNT: CPT

## 2021-12-02 PROCEDURE — 81025 URINE PREGNANCY TEST: CPT

## 2021-12-02 PROCEDURE — 85025 COMPLETE CBC W/AUTO DIFF WBC: CPT

## 2021-12-02 NOTE — XMS REPORT
Continuity of Care Document

                           Created on:2021



Patient:JA KOHLI

Sex:Female

:2000

External Reference #:[id number]





Demographics







                          Address                   [address]



                                                    [place] [address]

 

                          Home Phone                [phone]

 

                          Work Phone                [phone]

 

                          Mobile Phone              [phone]

 

                          Email Address             [email or web address]

 

                          Preferred Language        English

 

                          Marital Status            Unknown

 

                          Yarsani Affiliation     Unknown

 

                          Race                      Unknown

 

                          Additional Race(s)        Unavailable



                                                    Unavailable

 

                          Ethnic Group              Unknown









Author







                          Organization              [facility]

t

 

                          Address                   [address] [place] [place] [address]

 

                          Phone                     [phone]









Support







                Name            Relationship    Address         Phone

 

                MUKESH MOSS               Unavailable     [phone]

 

                [address]     [phone]



                                                [place] [address] 

 

                JUANA KOHLI               Unavailable     [phone]









Care Team Providers







                    Name                Role                Phone

 

                    JUD DONNA ROJAS  Primary Care Physician Unavailable

 

                    Visit,  Nurse       Attending Clinician Unavailable

 

                    Jud AVILA,  R      Attending Clinician [phone]

 

                    Doctor Unassigned,  Name Attending Clinician Unavailable

 

                    Amadou LARA,  T       Attending Clinician Unavailable

 

                    Only,  Db Test      Attending Clinician Unavailable

 

                    Subhash HWANGP           Attending Clinician [phone]

 

                    BOB RENNER          Attending Clinician Unavailable

 

                    Akinsipe WHCNP,  C  Attending Clinician [phone]

 

                    DEANNA,  C        Attending Clinician Unavailable

 

                    Ty ELAINE        Attending Clinician [phone]

 

                    Homer ELAINE         Attending Clinician [phone]

 

                    Naren EALINE,  M      Attending Clinician [phone]

 

                    Arturo LARA,  A        Attending Clinician Unavailable

 

                    Singer HAIRSTON           Attending Clinician [phone]

 

                    Ultrasound          Attending Clinician Unavailable

 

                    Felipe ELAINE,  F         Attending Clinician [phone]

 

                    Bianka AVILA,  F    Attending Clinician [phone]

 

                    DONNIE             Attending Clinician Unavailable

 

                    DONNIE             Admitting Clinician Unavailable









Payers







           Payer Name Policy Type Policy Number Effective Date Expiration Date [employer]            [id number]  2020-10-01            



           HEALTH                           00:00:00              

 

           MEDICAID [place]            [id number]  2020            



                                            00:00:00              







Problems







       Condition Condition Condition Status Onset  Resolution Last   Treating Co

mments 

Source



       Name   Details Category        Date   Date   Treatment Clinician        



                                                 Date                 

 

       Routine Routine Disease Active                              [facility]



       postpartum postpartum               5-24                               it

y of



       [place]               [place]                             [place]

[place]



                                                                    [facility]

 

       Chorioamni Chorioamni Disease Active                              U

nivers



       onitis onitis                                              ity of



                                   00:00:                             [place]



                                   00                                 Medical



                                                                      Branch

 

       Pre-eclamp Pre-eclamp Disease Active                              U

nivers



       elie in elie in                                              ity of



       third  third                00:00:                             [place]



       trimester trimester               00                                 [facility]

 

       S/P    S/P    Disease Active                              Univers



                                                      ity of



       section section               00:00:                             [place]



                                   00                                 Medical



                                                                      Branch

 

       Obesity Obesity Disease Active                              Univers



       (BMI   (BMI                 5-02                               ity of



       30-39.9) 30-39.9)               00:00:                             [place]



                                   00                                 [facility]

 

       40 weeks 40 weeks Disease Active                              Unive

rs



       gestation gestation               5-02                               ity 

of



       of     of                   00:00:                             [place]



       pregnancy pregnancy                                                [facility]

 

       Rubella Rubella Disease Active 2020                      Overview: Univ

ers



       non-immune non-immune               0-06                        Formattin

 ity of



       status, status,               00:00:                      g of this [place]



       antepartum antepartum               00                          note   Me

dical



                                                               might be Branch



                                                               different 



                                                               from the 



                                                               original. 



                                                               Address 



                                                               in PP. 

 

       Maternal Maternal Disease Active 2020                      Overview: Un

thierno



       varicella, varicella,               0-06                        Formattin

 ity of



       non-immune non-immune               00:00:                      g of this

 [place]



                                   00                          note   Medical



                                                               might be Branch



                                                               different 



                                                               from the 



                                                               original. 



                                                               Address 



                                                               in PP. 

 

       Supervisio Supervisio Disease Active 2020                             U

nivers



       n of high n of high               0-06                               ity 

of



       risk   risk                 00:00:                             [place]



       pregnancy pregnancy               00                                 Medi

lo



       in second in second                                                  Bran

ch



       trimester trimester                                                  

 

       Obesity in Obesity in Disease Active 2020                             U

nivers



       pregnancy pregnancy               0-06                               ity 

of



                                   00:00:                             [place]



                                   00                                 [facility]

 

       Primigravi Primigravi Disease Active 2020                             U

nivers



       da in  da in                0-06                               ity of



       second second               00:00:                             [place]



       trimester trimester               00                                 [facility]

 

       Nausea and Nausea and Disease Active 2020                             U

nivers



       vomiting vomiting               0-06                               ity of



       during during               00:00:                             [place]



       pregnancy pregnancy               00                                 Medi

lo



       prior to prior to                                                  Branch



       22 weeks 22 weeks                                                  



       gestation gestation                                                  

 

       No known No known Disease                                           Unive

rs



       active active                                                  ity of



       problems problems                                                  [facility]







Allergies, Adverse Reactions, Alerts







       Allergy Allergy Status Severity Reaction(s) Onset  Inactive Treating Comm

ents 

Source



       Name   Type                        Date   Date   Clinician        

 

       NO KNOWN Drug   Active                                           Univers



       ALLERGIE Class                                                   ity of



       S                                                              [facility]







Social History







           Social Habit Start Date Stop Date  Quantity   Comments   Source

 

           ASSERTION  2020            Pregnant              University of



                      00:00:00                                    [facility]

 

           Exposure to                       Not sure              University of



           SARS-CoV-2                                             [place] [facility]



           (event)                                                Branch

 

           History [facility]                                             University o

f



           Alcohol Comment                                             [place] [facility]

ical



                                                                  Branch

 

           Alcohol intake 2021 Lifetime              University

 of



                      00:00:00   00:00:00   non-drinker            [facility]



                                            (finding)             [place]

 

           Tobacco use and 2021 Never used            Universit

y of



           exposure   00:00:00   00:00:00                         [place] [facility]



                                                                  Branch

 

           History SDOH 2020-10-05 2020-10-05 1                     University o

f



           Alcohol Frequency 00:00:00   00:00:00                         [place] [facility]

edical



                                                                  Branch

 

           History SDOH 2020-10-05 2020-10-05 99                    University o

f



           Alcohol Std 00:00:00   00:00:00                         [place] [facility]



           Drinks                                                 Branch

 

           History [facility] 2020-10-05 2020-10-05 1                     University o

f



           Alcohol Binge 00:00:00   00:00:00                         [place] [facility]

al



                                                                  Branch

 

           Tobacco Comment 2020-10-05 2020-10-05 2-3 cigarretes            Unive

rsity of



                      00:00:00   00:00:00   per day               [facility]

 

           Sex Assigned At 2000                       Universit

y of



           Birth      00:00:00   00:00:00                         [facility]









                Smoking Status  Start Date      Stop Date       Source

 

                Current every day smoker 2021 00:00:00                 Uni

versity [facility]

 

                Current some day smoker 2021 00:00:00                 Univ

ersity [facility]

 

                Unknown if ever smoked                                 [facility]

y of [facility]







Medications







       Ordered Filled Start  Stop   Current Ordering Indication Dosage Frequency

 Signature

                    Comments            Components          Source



     Medication Medication Date Date Medication? Clinician                (SIG) 

          



     Name Name                                                   

 

     medroxyPROG      2021- No        [id number] 150mg                    

 Univers



     ESTERone      0-18 10-18                                         ity of



     (DEPO-PROVE      20:28: 20:29                                         [place]



     RA)       00   :00                                          Medical



     injection                                                        Branch



     150 mg                                                        

 

     medroxyPROG      2021- No        [id number] 150mg      150 mg,       

    Univers



     ESTERone      0-18 10-18                          Intramuscu           ity 

of



     (DEPO-PROVE      20:28: 20:29                          lar, ONCE,          

 [place]



     RA)       00   :00                           1 dose, On           Medical



     injection                                         Mon            Branch



     150 mg                                         10/18/21           



                                                  at 1530,           



                                                  Routine           

 

     medroxyPROG      2021- No        [id number] 150mg                    

 Univers



     ESTERone                                               ity of



     (DEPO-PROVE      22:30: 21:22                                         [place]



     RA)       00   :00                                          Medical



     injection                                                        Branch



     150 mg                                                        

 

     medroxyPROG      2021- No        [id number] 150mg      150 mg,       

    Univers



     ESTERone                                Intramuscu           ity 

of



     (DEPO-PROVE      22:30: 21:22                          lar, ONCE,          

 [place]



     RA)       00   :00                           1 dose,           Medical



     injection                                         Mon            Branch



     150 mg                                         21 at           



                                                  1730,           



                                                  Routine           

 

     HYDROCODONE            Yes                      Take  by           Un

thierno



     -ACETAMINOP      6-14                               mouth.           ity of



     HEN ORAL      20:47:                                              [address]

 

     HYDROCODONE            Yes                      Take  by           Un

thierno



     -ACETAMINOP      6-14                               mouth.           ity of



     HEN ORAL      20:47:                                              [address]

 

     HYDROCODONE      0      Yes                      Take  by           Un

thierno



     -ACETAMINOP      6-14                               mouth.           ity of



     HEN ORAL      20:47:                                              [address]

 

     HYDROCODONE            Yes                      Take  by           Un

thierno



     -ACETAMINOP      6-14                               mouth.           ity of



     HEN ORAL      20:47:                                              [address]

 

     HYDROCODONE      0      Yes                      Take  by           Un

thierno



     -ACETAMINOP      6-14                               mouth.           ity of



     HEN ORAL      20:47:                                              [address]

 

     HYDROCODONE      0      Yes                      Take  by           Un

thierno



     -ACETAMINOP      6-14                               mouth.           ity of



     HEN ORAL      20:47:                                              [address]

 

     HYDROCODONE      -0      Yes                      Take  by           Un

thierno



     -ACETAMINOP      6-14                               mouth.           ity of



     HEN ORAL      20:47:                                              [address]

 

     HYDROCODONE      -0      Yes                      Take  by           Un

thierno



     -ACETAMINOP      6-14                               mouth.           ity of



     HEN ORAL      20:47:                                              [address]

 

     HYDROCODONE      2021-0      Yes                      Take  by           Un

thierno



     -ACETAMINOP      6-14                               mouth.           ity of



     HEN ORAL      20:47:                                              [address]



                                                                 Branch

 

     HYDROCODONE            Yes                      Take  by           Un

thierno



     -ACETAMINOP      6-14                               mouth.           ity of



     HEN ORAL      20:47:                                              [address]



                                                                 Branch

 

     HYDROCODONE            Yes                      Take  by           Un

thierno



     -ACETAMINOP      6-14                               mouth.           ity of



     HEN ORAL      15:47:                                              [address]



                                                                 Branch

 

     HYDROCODONE            Yes                      Take  by           Un

thierno



     -ACETAMINOP      6-14                               mouth.           ity of



     HEN ORAL      15:47:                                              [address]



                                                                 Branch

 

     HYDROCODONE            Yes                      Take  by           Un

thierno



     -ACETAMINOP      6-14                               mouth.           ity of



     HEN ORAL      15:47:                                              [address]



                                                                 Branch

 

     HYDROCODONE            Yes                      Take  by           Un

thierno



     -ACETAMINOP      6-14                               mouth.           ity of



     HEN ORAL      15:47:                                              [place]



               27                                                Medical



                                                                 Branch

 

     prenatal            Yes       [id number] 1{tbl}      Take 1           

Univers



     vitamin      5-05                               tablet by           ity of



     w/FA tablet      00:00:                               mouth           [place]



               00                                 daily.           Medical



                                                                 Branch

 

     docusate            Yes       [id number] 240mg      Take 1           U

nivers



     calcium 240      5-05                               capsule by           it

y of



     mg capsule      00:00:                               mouth once           T

exas



               00                                 daily as           Medical



                                                  needed for           Branch



                                                  Constipati           



                                                  on.            

 

     ferrous            Yes       [id number] 325mg      Take 1           Un

thierno



     sulfate 325      5-05                               tablet by           ity

 of



     mg (65 mg      00:00:                               mouth 2           [place]



     iron)      00                                 (two)           Medical



     tablet                                         times           Branch



                                                  daily.           

 

     prenatal            Yes       [id number] 1{tbl}      Take 1           

Univers



     vitamin      5-05                               tablet by           ity of



     w/FA tablet      00:00:                               mouth           [place]



               00                                 daily.           Medical



                                                                 Branch

 

     docusate            Yes       [id number] 240mg      Take 1           U

nivers



     calcium 240      5-05                               capsule by           it

y of



     mg capsule      00:00:                               mouth once           T

exas



               00                                 daily as           Medical



                                                  needed for           Branch



                                                  Constipati           



                                                  on.            

 

     ferrous            Yes       [id number] 325mg      Take 1           Un

thierno



     sulfate 325      5-05                               tablet by           ity

 of



     mg (65 mg      00:00:                               mouth 2           [place]



     iron)      00                                 (two)           Medical



     tablet                                         times           Branch



                                                  daily.           

 

     prenatal            Yes       [id number] 1{tbl}      Take 1           

Univers



     vitamin      5-05                               tablet by           ity of



     w/FA tablet      00:00:                               mouth           [place]



               00                                 daily.           Medical



                                                                 Branch

 

     docusate            Yes       [id number] 240mg      Take 1           U

nivers



     calcium 240      5-05                               capsule by           it

y of



     mg capsule      00:00:                               mouth once           T

exas



               00                                 daily as           Medical



                                                  needed for           Branch



                                                  Constipati           



                                                  on.            

 

     ferrous            Yes       [id number] 325mg      Take 1           Un

thierno



     sulfate 325      5-05                               tablet by           ity

 of



     mg (65 mg      00:00:                               mouth 2           [place]



     iron)      00                                 (two)           Medical



     tablet                                         times           [facility]



                                                  daily.           

 

     prenatal            Yes       [id number] 1{tbl}      Take 1           

[facility]



     vitamin      5-05                               tablet by           [facility]



     w/FA tablet      00:00:                               mouth           [place]



               00                                 daily.           [facility]



                                                                 [facility]

 

     docusate            Yes       [id number] 240mg      Take 1           [facility]

[facility]



     calcium 240      5-05                               capsule by           it

y [facility]



     mg capsule      00:00:                               mouth once           [facility]



               00                                 daily as           [facility]



                                                  needed for           [facility]



                                                  Constipati           



                                                  on.            

 

     ferrous            Yes       [id number] 325mg      Take 1           [facility]

[facility]



     sulfate 325      5-05                               tablet by           [facility]

 of



     mg (65 mg      00:00:                               mouth 2           [place]



     iron)      00                                 (two)           [facility]



     tablet                                         times           [facility]



                                                  daily.           

 

     prenatal            Yes       [id number] 1{tbl}      Take 1           

[facility]



     vitamin      5-05                               tablet by           [facility]



     w/FA tablet      00:00:                               mouth           [place]



               00                                 daily.           [facility]



                                                                 [facility]

 

     docusate            Yes       [id number] 240mg      Take 1           [facility]

[facility]



     calcium 240      5-05                               capsule by           it

y of



     mg capsule      00:00:                               mouth once           [facility]

[facility]



               00                                 daily as           [facility]



                                                  needed for           [facility]



                                                  Constipati           



                                                  on.            

 

     ferrous            Yes       [id number] 325mg      Take 1           [facility]

[facility]



     sulfate 325      5-05                               tablet by           [facility]

 of



     mg (65 mg      00:00:                               mouth 2           [place]



     iron)      00                                 (two)           [facility]



     tablet                                         times           [facility]



                                                  daily.           

 

     prenatal            Yes       [id number] 1{tbl}      Take 1           

[facility]



     vitamin      5-05                               tablet by           [facility]



     w/FA tablet      00:00:                               mouth           [place]



               00                                 daily.           [facility]



                                                                 [facility]

 

     docusate            Yes       [id number] 240mg      Take 1           [facility]

[facility]



     calcium 240      5-05                               capsule by           it

y [facility]



     mg capsule      00:00:                               mouth once           [facility]

[facility]



               00                                 daily as           [facility]



                                                  needed for           [facility]



                                                  Constipati           



                                                  on.            

 

     ferrous            Yes       [id number] 325mg      Take 1           [facility]

[facility]



     sulfate 325      5-05                               tablet by           [facility]

 of



     mg (65 mg      00:00:                               mouth 2           [place]



     iron)      00                                 (two)           [facility]



     tablet                                         times           [facility]



                                                  daily.           

 

     prenatal            Yes       [id number] 1{tbl}      Take 1           

[facility]



     vitamin      5-05                               tablet by           [facility]



     w/FA tablet      00:00:                               mouth           [place]



               00                                 daily.           [facility]



                                                                 [facility]

 

     docusate            Yes       [id number] 240mg      Take 1           [facility]

[facility]



     calcium 240      5-05                               capsule by           it

y [facility]



     mg capsule      00:00:                               mouth once           [facility]

[facility]



               00                                 daily as           [facility]



                                                  needed for           [facility]



                                                  Constipati           



                                                  on.            

 

     ferrous            Yes       [id number] 325mg      Take 1           [facility]

[facility]



     sulfate 325      5-05                               tablet by           [facility]

 of



     mg (65 mg      00:00:                               mouth 2           [place]



     iron)      00                                 (two)           Medical



     tablet                                         times           [facility]



                                                  daily.           

 

     prenatal            Yes       [id number] 1{tbl}      Take 1           

[facility]



     vitamin      5-05                               tablet by           [facility]



     w/FA tablet      00:00:                               mouth           [place]



               00                                 daily.           [facility]



                                                                 [facility]

 

     docusate            Yes       [id number] 240mg      Take 1           [facility]

[facility]



     calcium 240      5-05                               capsule by           it

y of



     mg capsule      00:00:                               mouth once           [facility]

[facility]



               00                                 daily as           [facility]



                                                  needed for           [facility]



                                                  Constipati           



                                                  on.            

 

     ferrous            Yes       [id number] 325mg      Take 1           [facility]

[facility]



     sulfate 325      5-05                               tablet by           [facility]

 of



     mg (65 mg      00:00:                               mouth 2           [place]



     iron)      00                                 (two)           [facility]



     tablet                                         times           [facility]



                                                  daily.           

 

     prenatal            Yes       [id number] 1{tbl}      Take 1           

[facility]



     vitamin      5-05                               tablet by           [facility]



     w/FA tablet      00:00:                               mouth           [place]



               00                                 daily.           [facility]



                                                                 [facility]

 

     docusate            Yes       [id number] 240mg      Take 1           [facility]

[facility]



     calcium 240      5-05                               capsule by           it

y of



     mg capsule      00:00:                               mouth once           [facility]

[facility]



               00                                 daily as           [facility]



                                                  needed for           [facility]



                                                  Constipati           



                                                  on.            

 

     ferrous            Yes       [id number] 325mg      Take 1           [facility]

[facility]



     sulfate 325      5-05                               tablet by           [facility]

 of



     mg (65 mg      00:00:                               mouth 2           [place]



     iron)      00                                 (two)           Medical



     tablet                                         times           [facility]



                                                  daily.           

 

     prenatal            Yes       [id number] 1{tbl}      Take 1           

[facility]



     vitamin      5-05                               tablet by           [facility]



     w/FA tablet      00:00:                               mouth           [place]



               00                                 daily.           [facility]



                                                                 [facility]

 

     docusate            Yes       [id number] 240mg      Take 1           [facility]

[facility]



     calcium 240      5-05                               capsule by           it

y of



     mg capsule      00:00:                               mouth once           [facility]

[facility]



               00                                 daily as           [facility]



                                                  needed for           [facility]



                                                  Constipati           



                                                  on.            

 

     ferrous            Yes       [id number] 325mg      Take 1           [facility]

[facility]



     sulfate 325      5-05                               tablet by           [facility]

 of



     mg (65 mg      00:00:                               mouth 2           [place]



     iron)      00                                 (two)           [facility]



     tablet                                         times           [facility]



                                                  daily.           

 

     prenatal            Yes       [id number] 1{tbl}      Take 1           

[facility]



     vitamin      5-05                               tablet by           [facility]



     w/FA tablet      00:00:                               mouth           [place]



               00                                 daily.           [facility]



                                                                 [facility]

 

     docusate            Yes       [id number] 240mg      Take 1           [facility]

[facility]



     calcium 240      5-05                               capsule by           it

y of



     mg capsule      00:00:                               mouth once           [facility]

[facility]



               00                                 daily as           [facility]



                                                  needed for           [facility]



                                                  Constipati           



                                                  on.            

 

     ferrous            Yes       [id number] 325mg      Take 1           [facility]

[facility]



     sulfate 325      5-05                               tablet by           [facility]

 of



     mg (65 mg      00:00:                               mouth 2           [place]



     iron)      00                                 (two)           [facility]



     tablet                                         times           [facility]



                                                  daily.           

 

     prenatal            Yes       [id number] 1{tbl}      Take 1           

[facility]



     vitamin      5-05                               tablet by           [facility]



     w/FA tablet      00:00:                               mouth           [place]



               00                                 daily.           [facility]



                                                                 [facility]

 

     docusate            Yes       [id number] 240mg      Take 1           [facility]

[facility]



     calcium 240      5-05                               capsule by           it

y of



     mg capsule      00:00:                               mouth once           [facility]

[place]



               00                                 daily as           [facility]



                                                  needed for           [facility]



                                                  Constipati           



                                                  on.            

 

     ferrous            Yes       [id number] 325mg      Take 1           [facility]

[facility]



     sulfate 325      5-05                               tablet by           [facility]

 of



     mg (65 mg      00:00:                               mouth 2           [place]



     iron)      00                                 (two)           [facility]



     tablet                                         times           [facility]



                                                  daily.           

 

     prenatal            Yes       [id number] 1{tbl}      Take 1           

[facility]



     vitamin      5-05                               tablet by           [facility]



     w/FA tablet      00:00:                               mouth           [place]



               00                                 daily.           [facility]



                                                                 [facility]

 

     docusate            Yes       [id number] 240mg      Take 1           [facility]

[facility]



     calcium 240      5-05                               capsule by           it

y of



     mg capsule      00:00:                               mouth once           [facility]

[facility]



               00                                 daily as           [facility]



                                                  needed for           [facility]



                                                  Constipati           



                                                  on.            

 

     ferrous            Yes       [id number] 325mg      Take 1           [facility]

[facility]



     sulfate 325      5-05                               tablet by           [facility]

 of



     mg (65 mg      00:00:                               mouth 2           [place]



     iron)      00                                 (two)           [facility]



     tablet                                         times           [facility]



                                                  daily.           

 

     prenatal            Yes       [id number] 1{tbl}      Take 1           

[facility]



     vitamin      5-05                               tablet by           [facility]



     w/FA tablet      00:00:                               mouth           [place]



               00                                 daily.           [facility]



                                                                 [facility]

 

     docusate            Yes       [id number] 240mg      Take 1           [facility]

[facility]



     calcium 240      5-05                               capsule by           it

y of



     mg capsule      00:00:                               mouth once           [facility]

[facility]



               00                                 daily as           [facility]



                                                  needed for           [facility]



                                                  Constipati           



                                                  on.            

 

     ferrous            Yes       [id number] 325mg      Take 1           [facility]

[facility]



     sulfate 325      5-05                               tablet by           [facility]

 of



     mg (65 mg      00:00:                               mouth 2           [place]



     iron)      00                                 (two)           [facility]



     tablet                                         times           [facility]



                                                  daily.           

 

     prenatal            Yes       [id number] 1{tbl}      Take 1           

[facility]



     vitamin      5-05                               tablet by           [facility]



     w/FA tablet      00:00:                               mouth           [place]



               00                                 daily.           [facility]



                                                                 [facility]

 

     docusate            Yes       [id number] 240mg      Take 1           [facility]

[facility]



     calcium 240      5-05                               capsule by           it

y of



     mg capsule      00:00:                               mouth once           [facility]

[facility]



               00                                 daily as           [facility]



                                                  needed for           [facility]



                                                  Constipati           



                                                  on.            

 

     ferrous            Yes       [id number] 325mg      Take 1           [facility]

[facility]



     sulfate 325      5-05                               tablet by           [facility]

 of



     mg (65 mg      00:00:                               mouth 2           [place]



     iron)      00                                 (two)           [facility]



     tablet                                         times           [facility]



                                                  daily.           

 

     prenatal            Yes       [id number] 1{tbl}      Take 1           

[facility]



     vitamin      5-05                               tablet by           [facility]



     w/FA tablet      00:00:                               mouth           [place]



               00                                 daily.           [facility]



                                                                 [facility]

 

     docusate            Yes       [id number] 240mg      Take 1           [facility]

[facility]



     calcium 240      5-05                               capsule by           it

y of



     mg capsule      00:00:                               mouth once           [facility]

[facility]



               00                                 daily as           [facility]



                                                  needed for           [facility]



                                                  Constipati           



                                                  on.            

 

     ferrous            Yes       [id number] 325mg      Take 1           [facility]

[facility]



     sulfate 325      5-05                               tablet by           [facility]

 of



     mg (65 mg      00:00:                               mouth 2           [place]



     iron)      00                                 (two)           [facility]



     tablet                                         times           [facility]



                                                  daily.           

 

     prenatal            Yes       [id number] 1{tbl}      Take 1           

[facility]



     vitamin      5-05                               tablet by           [facility]



     w/FA tablet      00:00:                               mouth           [place]



               00                                 daily.           [facility]



                                                                 [facility]

 

     docusate            Yes       [id number] 240mg      Take 1           [facility]

[facility]



     calcium 240      5-05                               capsule by           it

y of



     mg capsule      00:00:                               mouth once           [facility]

[facility]



               00                                 daily as           [facility]



                                                  needed for           [facility]



                                                  Constipati           



                                                  on.            

 

     ferrous            Yes       [id number] 325mg      Take 1           [facility]

[facility]



     sulfate 325      5-05                               tablet by           [facility]

 of



     mg (65 mg      00:00:                               mouth 2           [place]



     iron)      00                                 (two)           [facility]



     tablet                                         times           [facility]



                                                  daily.           

 

     acetaminoph      2022- No        [id number] 650mg      Take 2        

   [facility]



     en        5-05 05-06                          tablets by           [facility]



     (TYLENOL)      00:00: 04:59                          mouth           [place]



     325 mg      00   :00                           every 6           [facility]



     tablet                                         (six)           [facility]



                                                  hours as           



                                                  needed for           



                                                  Pain           



                                                  (scale           



                                                  1-3).           

 

     acetaminoph      2022- No        [id number] 650mg      Take 2        

   [facility]



     en        5-05 05-06                          tablets by           [facility]



     (TYLENOL)      00:00: 04:59                          mouth           [place]



     325 mg      00   :00                           every 6           [facility]



     tablet                                         (six)           [facility]



                                                  hours as           



                                                  needed for           



                                                  Pain           



                                                  (scale           



                                                  1-3).           

 

     acetaminoph      2022- No        [id number] 650mg      Take 2        

   [facility]



     en        5-05 05-06                          tablets by           [facility]



     (TYLENOL)      00:00: 04:59                          mouth           [place]



     325 mg      00   :00                           every 6           [facility]



     tablet                                         (six)           [facility]



                                                  hours as           



                                                  needed for           



                                                  Pain           



                                                  (scale           



                                                  1-3).           

 

     acetaminoph      2022- No        [id number] 650mg      Take 2        

   [facility]



     en        5-05 05-06                          tablets by           [facility]



     (TYLENOL)      00:00: 04:59                          mouth           [place]



     325 mg      00   :00                           every 6           [facility]



     tablet                                         (six)           [facility]



                                                  hours as           



                                                  needed for           



                                                  Pain           



                                                  (scale           



                                                  1-3).           

 

     acetaminoph      2022- No        [id number] 650mg      Take 2        

   [facility]



     en        5-05 05-06                          tablets by           [facility] of



     (TYLENOL)      00:00: 04:59                          mouth           [place]



     325 mg      00   :00                           every 6           [facility]



     tablet                                         (six)           [facility]



                                                  hours as           



                                                  needed for           



                                                  Pain           



                                                  (scale           



                                                  1-3).           

 

     acetaminoph      2022- No        [id number] 650mg      Take 2        

   [facility]



     en        5-05 05-06                          tablets by           [facility]



     (TYLENOL)      00:00: 04:59                          mouth           [place]



     325 mg      00   :00                           every 6           [facility]



     tablet                                         (six)           [facility]



                                                  hours as           



                                                  needed for           



                                                  Pain           



                                                  (scale           



                                                  1-3).           

 

     acetaminoph      2022- No        [id number] 650mg      Take 2        

   [facility]



     en        5-05 05-06                          tablets by           [facility] of



     (TYLENOL)      00:00: 04:59                          mouth           [place]



     325 mg      00   :00                           every 6           [facility]



     tablet                                         (six)           [facility]



                                                  hours as           



                                                  needed for           



                                                  Pain           



                                                  (scale           



                                                  1-3).           

 

     acetaminoph      2022- No        [id number] 650mg      Take 2        

   [facility]



     en        5-05 05-06                          tablets by           [facility] of



     (TYLENOL)      00:00: 04:59                          mouth           [place]



     325 mg      00   :00                           every 6           [facility]



     tablet                                         (six)           [facility]



                                                  hours as           



                                                  needed for           



                                                  Pain           



                                                  (scale           



                                                  1-3).           

 

     acetaminoph      2022- No        [id number] 650mg      Take 2        

   [facility]



     en        5-05 05-06                          tablets by           [facility] of



     (TYLENOL)      00:00: 04:59                          mouth           [place]



     325 mg      00   :00                           every 6           [facility]



     tablet                                         (six)           [facility]



                                                  hours as           



                                                  needed for           



                                                  Pain           



                                                  (scale           



                                                  1-3).           

 

     acetaminoph      2022- No        [id number] 650mg      Take 2        

   [facility]



     en        5-05 05-06                          tablets by           [facility] of



     (TYLENOL)      00:00: 04:59                          mouth           [place]



     325 mg      00   :00                           every 6           [facility]



     tablet                                         (six)           [facility]



                                                  hours as           



                                                  needed for           



                                                  Pain           



                                                  (scale           



                                                  1-3).           

 

     acetaminoph      2022- No        [id number] 650mg      Take 2        

   [facility]



     en        5-05 05-06                          tablets by           ity of



     (TYLENOL)      00:00: 04:59                          mouth           [place]



     325 mg      00   :00                           every 6           [facility]



     tablet                                         (six)           [facility]



                                                  hours as           



                                                  needed for           



                                                  Pain           



                                                  (scale           



                                                  1-3).           

 

     acetaminoph      2022- No        [id number] 650mg      Take 2        

   [facility]



     en        5-05 05-06                          tablets by           ity of



     (TYLENOL)      00:00: 04:59                          mouth           [place]



     325 mg      00   :00                           every 6           [facility]



     tablet                                         (six)           [facility]



                                                  hours as           



                                                  needed for           



                                                  Pain           



                                                  (scale           



                                                  1-3).           

 

     acetaminoph      2022- No        [id number] 650mg      Take 2        

   [facility]



     en        5-05 05-06                          tablets by           ity of



     (TYLENOL)      00:00: 04:59                          mouth           [place]



     325 mg      00   :00                           every 6           Medical



     tablet                                         (six)           [facility]



                                                  hours as           



                                                  needed for           



                                                  Pain           



                                                  (scale           



                                                  1-3).           

 

     acetaminoph      2022- No        [id number] 650mg      Take 2        

   [facility]



     en        5-05 05-06                          tablets by           [facility] of



     (TYLENOL)      00:00: 04:59                          mouth           [place]



     325 mg      00   :00                           every 6           [facility]



     tablet                                         (six)           [facility]



                                                  hours as           



                                                  needed for           



                                                  Pain           



                                                  (scale           



                                                  1-3).           

 

     acetaminoph      2022- No        [id number] 650mg      Take 2        

   [facility]



     en        5-05 05-06                          tablets by           ity of



     (TYLENOL)      00:00: 04:59                          mouth           [place]



     325 mg      00   :00                           every 6           [facility]



     tablet                                         (six)           [facility]



                                                  hours as           



                                                  needed for           



                                                  Pain           



                                                  (scale           



                                                  1-3).           

 

     acetaminoph      2022- No        [id number] 650mg      Take 2        

   [facility]



     en        5-05 05-06                          tablets by           ity of



     (TYLENOL)      00:00: 04:59                          mouth           [place]



     325 mg      00   :00                           every 6           [facility]



     tablet                                         (six)           [facility]



                                                  hours as           



                                                  needed for           



                                                  Pain           



                                                  (scale           



                                                  1-3).           

 

     acetaminoph      2022- No        [id number] 650mg      Take 2        

   [facility]



     en        5-05 05-06                          tablets by           ity of



     (TYLENOL)      00:00: 04:59                          mouth           [place]



     325 mg      00   :00                           every 6           Medical



     tablet                                         (six)           [facility]



                                                  hours as           



                                                  needed for           



                                                  Pain           



                                                  (scale           



                                                  1-3).           

 

     HYDROcodone      2021- No        [id number] 1{tbl}      Take 1           [facility]

[facility]



     -acetaminop      5-05 05-13                          tablet by           it

y of



     hen 5-325      00:00: 04:59                          mouth           [place]



     mg tablet      00   :00                           every 6           Medical



                                                  (six)           [facility]



                                                  hours as           



                                                  needed           



                                                  (Pain           



                                                  scale           



                                                  above 4)           



                                                  for up to           



                                                  7 days. Do           



                                                  not exceed           



                                                  3 grams of           



                                                  acetaminop           



                                                  hen in 24           



                                                  hours.           



                                                  Indication           



                                                  s: acute           



                                                  pain           

 

     ibuprofen            Yes            600mg      600 mg,           [facility]

ers



     (IBU)      5-04                               Oral, Q6H,           [facility]



     tablet 600      17:00:                               First dose           [facility]

[facility]



     mg        00                                 (after           [facility]



                                                  last           [facility]



                                                  modificati           



                                                  on) on 21 at           



                                                  1200,           



                                                  Until           



                                                  Discontinu           



                                                  ed,            



                                                  Routine           

 

     rho(D)            Yes            300ug      300 mcg,           [facility]

s



     immune      5-04                               Intramuscu           [facility] of



     globulin      14:04:                               lar, ONCE,           [facility]

as



     (RHOGAM)      16                                 For 1           Medical



     syringe 300                                         dose,           [facility]



     mcg                                          Conditiona           



                                                  l, Routine           

 

     diphenhydrA            Yes            25mg      25 mg, IV           U

nivers



     MINE-0.9 %      5-04                               Piggyback,           ity

 of



     sod.chlr      14:02:                               Administer           [facility]

as



     (BENADRYL)      04                                 over 30           [facility]

l



     25 mg/50 mL                                         Minutes,           [facility]

ch



     piggyback                                         Q6HPRN, 1           



     25 mg                                         dose,           



                                                  Starting           



                                                  21           



                                                  at 0902,           



                                                  Until           



                                                  Discontinu           



                                                  ed,            



                                                  Routine,           



                                                  Itching           

 

     diphenhydrA            Yes            25mg      25 mg,           [facility]

ers



     MINE      5-04                               Oral,           [facility]



     (BENADRYL)      14:02:                               Q6HPRN,           [place]

[facility]



     tablet 25      04                                 Starting           [facility]

l



     mg                                           21           [facility]



                                                  at 0902,           



                                                  Until           



                                                  Discontinu           



                                                  ed,            



                                                  Routine,           



                                                  Sleep,           



                                                  Itching           

 

     ondansetron            Yes            4mg       4 mg, Slow           

[facility]



     (ZOFRAN      5-04                               IV Push,           [facility]



     (PF))      14:02:                               Q8HPRN,           [place]



     injection 4      03                                 Starting           Medi

lo



     mg                                           21           [facility]



                                                  at 0902,           



                                                  Until           



                                                  Discontinu           



                                                  ed,            



                                                  Routine,           



                                                  Nausea and           



                                                  Vomiting           



                                                  (N/V)           

 

     bisacodyL            Yes            10mg      10 mg,           [facility]

s



     (DULCOLAX)      5-04                               Rectal,           [facility] of



     suppository      14:02:                               QDAILYPRN,           

[place]



     10 mg      03                                 Starting           Medical



                                                  21           [facility]



                                                  at 0902,           



                                                  Until           



                                                  Discontinu           



                                                  ed,            



                                                  Routine,           



                                                  Constipati           



                                                  on             

 

     simethicone            Yes            160mg      160 mg,           Un

[facility]



     (GAS RELIEF      5-04                               Oral,           [facility]



     (SIMETHICON      14:02:                               PC+HSPRN,           [facility]

[facility]



     E))       03                                 Starting           Medical



     chewable                                         21           [facility]

h



     tablet 160                                         at 0902,           



     mg                                           Until           



                                                  Discontinu           



                                                  ed,            



                                                  Routine,           



                                                  Gas            

 

     docusate            Yes            240mg      240 mg,           [facility]

rs



     calcium      5-04                               Oral,           [facility]



     (SURFAK)      14:02:                               QDAILYPRN,           [facility]

as



     capsule 240      03                                 Starting           Medi

lo



     mg                                           21           [facility]



                                                  at 0902,           



                                                  Until           



                                                  Discontinu           



                                                  ed,            



                                                  Routine,           



                                                  Constipati           



                                                  on             

 

     magnesium            Yes            30mL      30 mL,           [facility]

s



     hydroxide      5-04                               Oral,           ity of



     (MILK OF      14:02:                               QDAILYPRN,           [facility]

as



     MAGNESIA)      03                                 Starting           [facility]

l



     400 mg/5 mL                                         21           Br

anch



     suspension                                         at 0902,           



     30 mL                                         Until           



                                                  Discontinu           



                                                  ed,            



                                                  Routine,           



                                                  Constipati           



                                                  on             

 

     HYDROcodone            Yes            1{tbl}      [Order 1           

[facility]



     -acetaminop      5-04                               Start]           [facility] of



     hen (NORCO      13:12:                               Name:           [place]



     5) 5-325 mg      41                                 HYDROcodon           Me

dical



     tablet 1                                         e-acetamin           [facility]

h



     tablet                                         ophen           



                                                  (NORCO 5)           



                                                  5-325 mg           



                                                  tablet 1           



                                                  tablet           



                                                  Signed           



                                                  Summary: 1           



                                                  tablet,           



                                                  Oral,           



                                                  Q6HPRN,           



                                                  Starting           



                                                  21           



                                                  at 0812,           



                                                  Until           



                                                  Discontinu           



                                                  ed,            



                                                  Routine,           



                                                  Pain           



                                                  (scale           



                                                  4-6)           



                                                  [Order 1           



                                                  End]           



                                                  [Order 2           



                                                  Start]           



                                                  Name:           



                                                  HYDROcodon           



                                                  e-acetamin           



                                                  ophen           



                                                  (NORCO 5)           



                                                  5-325 mg           



                                                  tablet 2           



                                                  tablet           



                                                  Signed           



                                                  Summary: 2           



                                                  tablet,           



                                                  Oral,           



                                                  Q6HPRN,           



                                                  Starting           



                                                  21           



                                                  at 0812,           



                                                  Until           



                                                  Discontinu           



                                                  ed,            



                                                  Routine,           



                                                  Pain           



                                                  (scale           



                                                  7-10)           



                                                  [Order 2           



                                                  End]           

 

     ondansetron      2021- No                       Slow IV           Un

[facility]



     (ZOFRAN       05-04                          Push, ONCE           ity o

f



     (PF))      02:46: 03:20                          INTRA           [place]



     injection      00   :39                           PROCEDURE,           Medi

lo



                                                  Starting           [facility]



                                                  Mon 5/3/21           



                                                  at 2146,           



                                                  Until Mon           



                                                  5/3/21 at           



                                                  2220,           



                                                  Routine,           



                                                  Intra-op           

 

     PHENYLephri      2021- No                       Slow IV           Un

[facility]



     ne 1000      - 05-04                          Push, ONCE           ity o

f



     mcg/10 mL      02:38: 03:20                          INTRA           [place]



     in 0.9%      00   :39                           PROCEDURE,           [facility]

l



     NaCl                                         Starting           [facility]



     syringe                                         Mon 5/3/21           



                                                  at 2138,           



                                                  Until Mon           



                                                  5/3/21 at           



                                                  2220,           



                                                  Routine,           



                                                  Intra-op           

 

     morpHINE PF      2021- No                       Intravenou          

 [facility]



     (DURAMORPH-      5- 05-04                          s, ONCE           ity 

of



     PF)       02:23: 03:20                          INTRA           [place]



     injection      00   :39                           PROCEDURE,           Medi

lo



                                                  Starting           Branch



                                                  Mon 5/3/21           



                                                  at ,           



                                                  Until           



                                                  Discontinu           



                                                  ed,            



                                                  Routine,           



                                                  Intra-op           

 

     LR 1000 mL      2021- No                       IV             Univer

s



     + oxytocin                                Infusion,           ity

 of



     40 units 40      02:17: 03:20                          CONTINUOUS          

 [place]



     unit/ 1,000      00   :39                           PRN,           Medical



     mL IV                                         Starting           Branch



     Solution                                         Mon 5/3/21           



                                                  at ,           



                                                  Until           



                                                  Discontinu           



                                                  ed,            



                                                  Routine,           



                                                  Intra-op           

 

     FENTanyl PF      2021- No                       Intravenou          

 [facility]



     (SUBLIMAZE                                s, ONCE           ity o

f



     (PF))      02:17: 03:20                          INTRA           [place]



     injection      00   :39                           PROCEDURE,           Medi

lo



                                                  Starting           Branch



                                                  Mon 5/3/21           



                                                  at ,           



                                                  Until           



                                                  Discontinu           



                                                  ed,            



                                                  Routine,           



                                                  Intra-op           

 

     metroNIDAZO            Yes            500mg      500 mg, IV          

 [facility]



     LE in NaCl                                     Piggyback,           ity

 of



     (iso-os)      02:15:                               Q8H ABX,           [place]



     (FLAGYL      00                                 First dose           Medica

l



     I.V.) RTU                                         on [facility]



     IV infusion                                         5/3/21 at           



     500 mg                                         ,           



                                                  Until           



                                                  Discontinu           



                                                  ed, 100           



                                                  mL<br>Reas           



                                                  on for           



                                                  Anti-Infec           



                                                  tive:           



                                                  Surgical           



                                                  Prophylaxi           



                                                  s<br>Surgi           



                                                  lo            



                                                  Prophylaxi           



                                                  s:             



                                                  OB/GYN<br>           



                                                  Duration           



                                                  of             



                                                  therapy:           



                                                  within 24           



                                                  hours of           



                                                  surgery           

 

     midazolam      2021- No                       IV Push,           Uni

vers



     (VERSED)                                ONCE INTRA           ity 

of



     injection      02:10: 03:20                          PROCEDURE,           [place]

[place]



               00   :39                           Starting           Medical



                                                  Mon 5/3/21           Branch



                                                  at ,           



                                                  Until           



                                                  Discontinu           



                                                  ed,            



                                                  Routine,           



                                                  Intra-op           

 

     succinylcho      2021- No                       IV Push,           U

nivers



     line      -04                          ONCE INTRA           ity of



     (QUELICIN)      02:08: 03:20                          PROCEDURE,           

[place]



     injection      00   :39                           Starting           Medica

l



                                                  Mon 5/3/21           Branch



                                                  at ,           



                                                  Until           



                                                  Discontinu           



                                                  ed,            



                                                  Routine,           



                                                  Intra-op           

 

     propofoL IV      2021- No                       Intravenou          

 [facility]



     infusion                                s, ONCE           ity of



               02:08: 03:20                          INTRA           [place]



               00   :39                           PROCEDURE,           Medical



                                                  Starting           Branch



                                                  Mon 5/3/21           



                                                  at 2108,           



                                                  Until           



                                                  Discontinu           



                                                  ed,            



                                                  Routine,           



                                                  Intra-op           

 

     lactated      2021- No                       Intravenou           Un

[facility]



     ringers IV      -                          s,             ity of



     infusion      02:01: 03:20                          CONTINUOUS           [place]

[place]



               00   :39                           PRN,           Medical



                                                  Starting           Branch



                                                  Mon 5/3/21           



                                                  at 2101,           



                                                  Until           



                                                  Discontinu           



                                                  ed,            



                                                  Routine,           



                                                  Intra-op           

 

     lidocaine      2021- No                       Epidural,           Un

[facility]



     2% +      -                          CONTINUOUS           ity of



     epinephrine      01:52: 03:20                          PRN,           [place]



     1:1000 +      00   :39                           Starting           Medical



     fentanyl 50                                         Mon 5/3/21           Br

anch



     mcg/mL                                         at ,           



                                                  Until Mon           



                                                  5/3/21 at           



                                                  2220,           



                                                  Routine,           



                                                  Intra-op           

 

     FENTanyl 2      2021- No                       Intra-op           Un

[facility]



     mcg/mL +                                               ity of



     bupivacaine      05:11: 03:20                                         [place]



     0.1% in NS      00   :39                                          Medical



     250 mL                                                        Branch



     epidural                                                        



     bag                                                         

 

     lidocaine-e      2021- No                       Intraderma          

 [facility]



     pinephrine      -                          l, ONCE           ity o

f



     (XYLOCAINE      05:07: 03:20                          INTRA           [place]



     W/EPINEPHRI      00   :39                           PROCEDURE,           Me

dical



     NE) 1.5                                         Starting           Branch



     %-1:200,000                                         Mon 5/3/21           



     injection                                         at 0007,           



                                                  Until           



                                                  Discontinu           



                                                  ed,            



                                                  Routine,           



                                                  Intra-op           

 

     lidocaine      2021- No                       Infiltrati           U

nivers



     1%        -                          on, ONCE           ity of



     (XYLOCAINE)      05:06: 03:20                          INTRA           [place]

[place]



     100 mg/10      00   :39                           PROCEDURE,           Medi

lo



     mL (1 %)                                         Starting           Branch



     injection                                         Mon 5/3/21           



                                                  at 0006,           



                                                  Until           



                                                  Discontinu           



                                                  ed,            



                                                  Routine,           



                                                  Intra-op           

 

     dexamethaso      2021- No             10mg      10 mg,           Uni

vers



     ne        3-04 03-04                          Oral,           ity of



     (DECADRON      21:30: 20:45                          ONCE, 1           [place]

s



     PHOSPHATE)      00   :00                           dose, Thu           Medi

lo



     injection                                         3/4/21 at           Branc

h



     10 mg                                         1530,           



                                                  Routine           

 

     proMETHazin      2020      Yes       [id number] 25mg      Take 1           

[facility]



     e 25 mg      2-21                               tablet by           ity of



     tablet      00:00:                               mouth           [place]



               00                                 every 4           Medical



                                                  (four)           Branch



                                                  hours as           



                                                  needed for           



                                                  Nausea and           



                                                  Vomiting           



                                                  (N/V).           

 

     proMETHazin      2020-1      Yes       [id number] 25mg      Take 1           

[facility]



     e 25 mg      2-21                               tablet by           ity of



     tablet      00:00:                               mouth           [place]



               00                                 every 4           Medical



                                                  (four)           Branch



                                                  hours as           



                                                  needed for           



                                                  Nausea and           



                                                  Vomiting           



                                                  (N/V).           

 

     proMETHazin      2020-1      Yes       [id number] 25mg      Take 1           

[facility]



     e 25 mg      2-21                               tablet by           ity of



     tablet      00:00:                               mouth           [place]



               00                                 every 4           Medical



                                                  (four)           Branch



                                                  hours as           



                                                  needed for           



                                                  Nausea and           



                                                  Vomiting           



                                                  (N/V).           

 

     proMETHazin      2020-1      Yes       [id number] 25mg      Take 1           

[facility]



     e 25 mg      2-21                               tablet by           ity of



     tablet      00:00:                               mouth           [place]



               00                                 every 4           Medical



                                                  (four)           Branch



                                                  hours as           



                                                  needed for           



                                                  Nausea and           



                                                  Vomiting           



                                                  (N/V).           

 

     proMETHazin      -1      Yes       [id number] 25mg      Take 1           

[facility]



     e 25 mg      2-21                               tablet by           ity of



     tablet      00:00:                               mouth           [place]



               00                                 every 4           Medical



                                                  (four)           Branch



                                                  hours as           



                                                  needed for           



                                                  Nausea and           



                                                  Vomiting           



                                                  (N/V).           

 

     proMETHazin      -1      Yes       [id number] 25mg      Take 1           

[facility]



     e 25 mg      2-21                               tablet by           ity of



     tablet      00:00:                               mouth           [place]



               00                                 every 4           Medical



                                                  (four)           Branch



                                                  hours as           



                                                  needed for           



                                                  Nausea and           



                                                  Vomiting           



                                                  (N/V).           

 

     proMETHazin      2020-1      Yes       [id number] 25mg      Take 1           

[facility]



     e 25 mg      2-21                               tablet by           ity of



     tablet      00:00:                               mouth           [place]



               00                                 every 4           Medical



                                                  (four)           Branch



                                                  hours as           



                                                  needed for           



                                                  Nausea and           



                                                  Vomiting           



                                                  (N/V).           

 

     proMETHazin      2020-1      Yes       [id number] 25mg      Take 1           

[facility]



     e 25 mg      2-21                               tablet by           ity of



     tablet      00:00:                               mouth           [place]



               00                                 every 4           Medical



                                                  (four)           Branch



                                                  hours as           



                                                  needed for           



                                                  Nausea and           



                                                  Vomiting           



                                                  (N/V).           

 

     proMETHazin      2020-1      Yes       [id number] 25mg      Take 1           

[facility]



     e 25 mg      2-21                               tablet by           ity of



     tablet      00:00:                               mouth           [place]



               00                                 every 4           Medical



                                                  (four)           Branch



                                                  hours as           



                                                  needed for           



                                                  Nausea and           



                                                  Vomiting           



                                                  (N/V).           

 

     proMETHazin      2020-1      Yes       [id number] 25mg      Take 1           

[facility]



     e 25 mg      2-21                               tablet by           ity of



     tablet      00:00:                               mouth           [place]



               00                                 every 4           Medical



                                                  (four)           Branch



                                                  hours as           



                                                  needed for           



                                                  Nausea and           



                                                  Vomiting           



                                                  (N/V).           

 

     proMETHazin      2020-1      Yes       [id number] 25mg      Take 1           

[facility]



     e 25 mg      2-21                               tablet by           ity of



     tablet      00:00:                               mouth           [place]



               00                                 every 4           Medical



                                                  (four)           Branch



                                                  hours as           



                                                  needed for           



                                                  Nausea and           



                                                  Vomiting           



                                                  (N/V).           

 

     proMETHazin      2020-1      Yes       [id number] 25mg      Take 1           

[facility]



     e 25 mg      2-21                               tablet by           ity of



     tablet      00:00:                               mouth           [place]



               00                                 every 4           Medical



                                                  (four)           Branch



                                                  hours as           



                                                  needed for           



                                                  Nausea and           



                                                  Vomiting           



                                                  (N/V).           

 

     proMETHazin      2020-1      Yes       [id number] 25mg      Take 1           

[facility]



     e 25 mg      2-21                               tablet by           ity of



     tablet      00:00:                               mouth           [place]



               00                                 every 4           Medical



                                                  (four)           Branch



                                                  hours as           



                                                  needed for           



                                                  Nausea and           



                                                  Vomiting           



                                                  (N/V).           

 

     proMETHazin      2020-1      Yes       [id number] 25mg      Take 1           

[facility]



     e 25 mg      2-21                               tablet by           ity of



     tablet      00:00:                               mouth           [place]



               00                                 every 4           Medical



                                                  (four)           Branch



                                                  hours as           



                                                  needed for           



                                                  Nausea and           



                                                  Vomiting           



                                                  (N/V).           

 

     proMETHazin      2020-1      Yes       [id number] 25mg      Take 1           

[facility]



     e 25 mg      2-21                               tablet by           ity of



     tablet      00:00:                               mouth           [place]



               00                                 every 4           Medical



                                                  (four)           Branch



                                                  hours as           



                                                  needed for           



                                                  Nausea and           



                                                  Vomiting           



                                                  (N/V).           

 

     proMETHazin      2020-1      Yes       [id number] 25mg      Take 1           

[facility]



     e 25 mg      2-21                               tablet by           ity of



     tablet      00:00:                               mouth           [place]



               00                                 every 4           Medical



                                                  (four)           Branch



                                                  hours as           



                                                  needed for           



                                                  Nausea and           



                                                  Vomiting           



                                                  (N/V).           

 

     proMETHazin      2020-1      Yes       [id number] 25mg      Take 1           

[facility]



     e 25 mg      2-21                               tablet by           [facility] of



     tablet      00:00:                               mouth           [place]



               00                                 every 4           [facility]



                                                  (four)           Branch



                                                  hours as           



                                                  needed for           



                                                  Nausea and           



                                                  Vomiting           



                                                  (N/V).           

 

     proMETHazin      2020-1      Yes       [id number] 25mg      Take 1           

[facility]



     e 25 mg      2-21                               tablet by           [facility] of



     tablet      00:00:                               mouth           [place]



               00                                 every 4           Medical



                                                  (four)           Branch



                                                  hours as           



                                                  needed for           



                                                  Nausea and           



                                                  Vomiting           



                                                  (N/V).           

 

     proMETHazin      2020-1      Yes       [id number] 25mg      Take 1           

[facility]



     e 25 mg      2-21                               tablet by           [facility] of



     tablet      00:00:                               mouth           [place]



               00                                 every 4           Medical



                                                  (four)           Branch



                                                  hours as           



                                                  needed for           



                                                  Nausea and           



                                                  Vomiting           



                                                  (N/V).           

 

     prenatal      2020      Yes       [id number] 1{packe      Take 1           

[facility]



     vit       0-12                     t}        Packet by           [facility] of



     33-iron-fol      00:00:                               mouth           [place]



     ic-dha      00                                 daily.           Medical



     (SELECT-OB                                                        [facility]anch



     + DHA) 29                                                        



     mg iron-1                                                        



     mg -250 mg                                                        



     combo pack                                                        

 

     prenatal      2020      Yes       [id number] 1{packe      Take 1           

[facility]



     vit       0-12                     t}        Packet by           [facility] of



     33-iron-fol      00:00:                               mouth           [place]



     ic-dha      00                                 daily.           Medical



     (SELECT-OB                                                        [facility]anch



     + DHA) 29                                                        



     mg iron-1                                                        



     mg -250 mg                                                        



     combo pack                                                        

 

     prenatal      2020      Yes       [id number] 1{packe      Take 1           

[facility]



     vit       0-12                     t}        Packet by           [facility] of



     33-iron-fol      00:00:                               mouth           [place]



     ic-dha      00                                 daily.           Medical



     (SELECT-OB                                                        Branch



     + DHA) 29                                                        



     mg iron-1                                                        



     mg -250 mg                                                        



     combo pack                                                        

 

     prenatal      2020      Yes       [id number] 1{packe      Take 1           

[facility]



     vit       0-12                     t}        Packet by           [facility] of



     33-iron-fol      00:00:                               mouth           [place]



     ic-dha      00                                 daily.           Medical



     (SELECT-OB                                                        [facility]anch



     + DHA) 29                                                        



     mg iron-1                                                        



     mg -250 mg                                                        



     combo pack                                                        

 

     prenatal      2020      Yes       [id number] 1{packe      Take 1           

[facility]



     vit       0-12                     t}        Packet by           [facility] of



     33-iron-fol      00:00:                               mouth           [place]



     ic-dha      00                                 daily.           Medical



     (SELECT-OB                                                        [facility]anch



     + DHA) 29                                                        



     mg iron-1                                                        



     mg -250 mg                                                        



     combo pack                                                        

 

     prenatal      2020      Yes       [id number] 1{packe      Take 1           

[facility]



     vit       0-12                     t}        Packet by           ity of



     33-iron-fol      00:00:                               mouth           [place]



     ic-dha      00                                 daily.           Medical



     (SELECT-OB                                                        Branch



     + DHA) 29                                                        



     mg iron-1                                                        



     mg -250 mg                                                        



     combo pack                                                        

 

     prenatal      2020      Yes       [id number] 1{packe      Take 1           

[facility]



     vit       0-12                     t}        Packet by           ity of



     33-iron-fol      00:00:                               mouth           [place]



     ic-dha      00                                 daily.           Medical



     (SELECT-[facility]                                                        [facility]anch



     + DHA) 29                                                        



     mg iron-1                                                        



     mg -250 mg                                                        



     combo pack                                                        

 

     prenatal      2020      Yes       [id number] 1{packe      Take 1           

[facility]



     vit       0-12                     t}        Packet by           ity of



     33-iron-fol      00:00:                               mouth           [place]



     ic-dha      00                                 daily.           Medical



     (SELECT-[facility]                                                        [facility]anch



     + DHA) 29                                                        



     mg iron-1                                                        



     mg -250 mg                                                        



     combo pack                                                        

 

     prenatal      2020-      Yes       [id number] 1{packe      Take 1           

[facility]



     vit       0-12                     t}        Packet by           [facility] of



     33-iron-fol      00:00:                               mouth           [place]



     ic-dha      00                                 daily.           Medical



     (SELECT-[facility]                                                        [facility]anch



     + DHA) 29                                                        



     mg iron-1                                                        



     mg -250 mg                                                        



     combo pack                                                        

 

     prenatal      2020-      Yes       [id number] 1{packe      Take 1           

[facility]



     vit       0-12                     t}        Packet by           ity of



     33-iron-fol      00:00:                               mouth           [place]



     ic-dha      00                                 daily.           Medical



     (SELECT-[facility]                                                        Branch



     + DHA) 29                                                        



     mg iron-1                                                        



     mg -250 mg                                                        



     combo pack                                                        

 

     prenatal      2020-      Yes       [id number] 1{packe      Take 1           

[facility]



     vit       0-12                     t}        Packet by           [facility] of



     33-iron-fol      00:00:                               mouth           [place]



     ic-dha      00                                 daily.           Medical



     (SELECT-[facility]                                                        [facility]anch



     + DHA) 29                                                        



     mg iron-1                                                        



     mg -250 mg                                                        



     combo pack                                                        

 

     prenatal      2020-      Yes       [id number] 1{packe      Take 1           

[facility]



     vit       0-12                     t}        Packet by           [facility] of



     33-iron-fol      00:00:                               mouth           [place]



     ic-dha      00                                 daily.           Medical



     (SELECT-[facility]                                                        [facility]anch



     + DHA) 29                                                        



     mg iron-1                                                        



     mg -250 mg                                                        



     combo pack                                                        

 

     prenatal      2020-      Yes       [id number] 1{packe      Take 1           

[facility]



     vit       0-12                     t}        Packet by           ity of



     33-iron-fol      00:00:                               mouth           [place]



     ic-dha      00                                 daily.           Medical



     (SELECT-[facility]                                                        [facility]anch



     + DHA) 29                                                        



     mg iron-1                                                        



     mg -250 mg                                                        



     combo pack                                                        

 

     prenatal      2020-      Yes       [id number] 1{packe      Take 1           

[facility]



     vit       0-12                     t}        Packet by           [facility] of



     33-iron-fol      00:00:                               mouth           [place]



     ic-dha      00                                 daily.           Medical



     (SELECT-[facility]                                                        [facility]anch



     + DHA) 29                                                        



     mg iron-1                                                        



     mg -250 mg                                                        



     combo pack                                                        

 

     prenatal      2020-      Yes       [id number] 1{packe      Take 1           

[facility]



     vit       0-12                     t}        Packet by           ity of



     33-iron-fol      00:00:                               mouth           [place]



     ic-dha      00                                 daily.           Medical



     ([facility]                                                        [facility]anch



     + DHA) 29                                                        



     mg iron-1                                                        



     mg -250 mg                                                        



     combo pack                                                        

 

     prenatal      2020-      Yes       [id number] 1{packe      Take 1           

[facility]



     vit       0-12                     t}        Packet by           ity of



     33-iron-fol      00:00:                               mouth           [place]



     ic-dha      00                                 daily.           Medical



     ([facility]                                                        [facility]anch



     + DHA) 29                                                        



     mg iron-1                                                        



     mg -250 mg                                                        



     combo pack                                                        

 

     prenatal      2020      Yes       [id number] 1{packe      Take 1           

[facility]



     vit       0-12                     t}        Packet by           ity of



     33-iron-fol      00:00:                               mouth           [place]



     ic-dha      00                                 daily.           Medical



     ([facility]                                                        [facility]anch



     + DHA) 29                                                        



     mg iron-1                                                        



     mg -250 mg                                                        



     combo pack                                                        

 

     prenatal      2020      Yes       [id number] 1{packe      Take 1           

[facility]



     vit       0-12                     t}        Packet by           ity of



     33-iron-fol      00:00:                               mouth           [place]



     ic-dha      00                                 daily.           Medical



     ([facility]                                                        [facility]



     + DHA) 29                                                        



     mg iron-1                                                        



     mg -250 mg                                                        



     combo pack                                                        

 

     prenatal      2020      Yes       [id number] 1{packe      Take 1           

[facility]



     vit       0-12                     t}        Packet by           ity of



     33-iron-fol      00:00:                               mouth           [place]



     ic-dha      00                                 daily.           Medical



     ([facility]                                                        [facility]anch



     + DHA) 29                                                        



     mg iron-1                                                        



     mg -250 mg                                                        



     combo pack                                                        

 

     prenatal      2020      Yes       [id number] 1{packe      Take 1           

[facility]



     vit       0-12                     t}        Packet by           ity of



     33-iron-fol      00:00:                               mouth           [place]



     ic-dha      00                                 daily.           Medical



     ([facility]                                                        [facility]



     + DHA) 29                                                        



     mg iron-1                                                        



     mg -250 mg                                                        



     combo pack                                                        

 

     prenatal      2020      Yes       [id number] 1{packe      Take 1           

[facility]



     vit       0-12                     t}        Packet by           ity of



     33-iron-fol      00:00:                               mouth           [place]



     ic-dha      00                                 daily.           Medical



     (SELECT-[facility]                                                        [facility]



     + DHA) 29                                                        



     mg iron-1                                                        



     mg -250 mg                                                        



     combo pack                                                        

 

     prenatal      2020      Yes       [id number] 1{packe      Take 1           

[facility]



     vit       0-12                     t}        Packet by           ity of



     33-iron-fol      00:00:                               mouth           [place]



     ic-dha      00                                 daily.           Medical



     ([facility]                                                        [facility]



     + DHA) 29                                                        



     mg iron-1                                                        



     mg -250 mg                                                        



     combo pack                                                        

 

     prenatal      2020      Yes       [id number] 1{packe      Take 1           

[facility]



     vit       0-12                     t}        Packet by           ity of



     33-iron-fol      00:00:                               mouth           [place]



     ic-dha      00                                 daily.           Medical



     (SELECT-[facility]                                                        [facility]



     + DHA) 29                                                        



     mg iron-1                                                        



     mg -250 mg                                                        



     combo pack                                                        

 

     prenatal      2020      Yes       [id number] 1{packe      Take 1           

[facility]



     vit       0-12                     t}        Packet by           ity of



     33-iron-fol      00:00:                               mouth           [place]



     ic-dha      00                                 daily.           Medical



     (SELECT-[facility]                                                        [facility]



     + DHA) 29                                                        



     mg iron-1                                                        



     mg -250 mg                                                        



     combo pack                                                        

 

     prenatal      2020      Yes       [id number] 1{packe      Take 1           

[facility]



     vit       0-12                     t}        Packet by           ity of



     33-iron-fol      00:00:                               mouth           [place]



     ic-dha      00                                 daily.           Medical



     (SELECT-[facility]                                                        [facility]anch



     + DHA) 29                                                        



     mg iron-1                                                        



     mg -250 mg                                                        



     combo pack                                                        

 

     prenatal      2020      Yes       [id number] 1{packe      Take 1           

[facility]



     vit       0-12                     t}        Packet by           ity of



     33-iron-fol      00:00:                               mouth           [place]



     ic-dha      00                                 daily.           Medical



     (SELECT-[facility]                                                        Branch



     + DHA) 29                                                        



     mg iron-1                                                        



     mg -250 mg                                                        



     combo pack                                                        

 

     prenatal      2020      Yes       [id number] 1{packe      Take 1           

[facility]



     vit       0-12                     t}        Packet by           ity of



     33-iron-fol      00:00:                               mouth           [place]



     ic-dha      00                                 daily.           Medical



     (SELECT-[facility]                                                        [facility]anch



     + DHA) 29                                                        



     mg iron-1                                                        



     mg -250 mg                                                        



     combo pack                                                        

 

     prenatal      2020      Yes       [id number] 1{packe      Take 1           

[facility]



     vit       0-12                     t}        Packet by           ity of



     33-iron-fol      00:00:                               mouth           [place]



     ic-dha      00                                 daily.           Medical



     (SELECT-[facility]                                                        Branch



     + DHA) 29                                                        



     mg iron-1                                                        



     mg -250 mg                                                        



     combo pack                                                        

 

     prenatal      2020      Yes       [id number] 1{packe      Take 1           

[facility]



     vit       0-12                     t}        Packet by           [facility] of



     33-iron-fol      00:00:                               mouth           [place]



     ic-dha      00                                 daily.           Medical



     (SELECT-[facility]                                                        [facility]anch



     + DHA) 29                                                        



     mg iron-1                                                        



     mg -250 mg                                                        



     combo pack                                                        

 

     metroNIDAZO      2020 2020- No        [id number] 1000mg      Take 2        

   [facility]



      mg      0-07 10-09                          tablets by           [facility]

 of



     tablet      00:00: 04:59                          mouth 2           [place]



               00   :00                           (two)           Medical



                                                  times           Branch



                                                  daily for           



                                                  1 day.           

 

     metroNIDAZO      2020- No        [id number] 1000mg      Take 2        

   [facility]



      mg      0-07 10-09                          tablets by           [facility]

 of



     tablet      00:00: 04:59                          mouth 2           [place]



               00   :00                           (two)           Medical



                                                  times           [facility]



                                                  daily for           



                                                  1 day.           

 

     metroNIDAZO      2020- No        [id number] 1000mg      Take 2        

   [facility]



      mg      0-07 10-09                          tablets by           [facility]

 of



     tablet      00:00: 04:59                          mouth 2           [place]



               00   :00                           (two)           [facility]



                                                  times           [facility]



                                                  daily for           



                                                  1 day.           

 

     metroNIDAZO      - 2020- No        [id number] 1000mg      Take 2        

   [facility]



      mg      0-07 10-09                          tablets by           [facility]

 of



     tablet      00:00: 04:59                          mouth 2           [place]



               00   :00                           (two)           [facility]



                                                  times           [facility]



                                                  daily for           



                                                  1 day.           

 

     proMETHazin      2020      Yes       [id number] 25mg      Take 1           

[facility]



     e 25 mg      0-05                               tablet by           [facility] of



     tablet      00:00:                               mouth           [place]



               00                                 every 4           Medical



                                                  (four)           Branch



                                                  hours as           



                                                  needed for           



                                                  Nausea and           



                                                  Vomiting           



                                                  (N/V).           

 

     proMETHazin      2020      Yes       [id number] 25mg      Take 1           

[facility]



     e 25 mg      0-05                               tablet by           [facility] of



     tablet      00:00:                               mouth           [place]



               00                                 every 4           [facility]



                                                  (four)           [facility]anch



                                                  hours as           



                                                  needed for           



                                                  Nausea and           



                                                  Vomiting           



                                                  (N/V).           

 

     proMETHazin      2020      Yes       [id number] 25mg      Take 1           

[facility]



     e 25 mg      0-05                               tablet by           [facility]



     tablet      00:00:                               mouth           [place]



               00                                 every 4           [facility]



                                                  (four)           [facility]



                                                  hours as           



                                                  needed for           



                                                  Nausea and           



                                                  Vomiting           



                                                  (N/V).           

 

     proMETHazin      2020      Yes       [id number] 25mg      Take 1           

[facility]



     e 25 mg      0-05                               tablet by           [facility] of



     tablet      00:00:                               mouth           [place]



               00                                 every 4           [facility]



                                                  (four)           [facility]



                                                  hours as           



                                                  needed for           



                                                  Nausea and           



                                                  Vomiting           



                                                  (N/V).           

 

     proMETHazin      2020      Yes       [id number] 25mg      Take 1           

[facility]



     e 25 mg      0-05                               tablet by           [facility] of



     tablet      00:00:                               mouth           [place]



               00                                 every 4           [facility]



                                                  (four)           [facility]



                                                  hours as           



                                                  needed for           



                                                  Nausea and           



                                                  Vomiting           



                                                  (N/V).           

 

     proMETHazin      2020      Yes       [id number] 25mg      Take 1           

[facility]



     e 25 mg      0-05                               tablet by           [facility] of



     tablet      00:00:                               mouth           [place]



               00                                 every 4           [facility]



                                                  (four)           [facility]



                                                  hours as           



                                                  needed for           



                                                  Nausea and           



                                                  Vomiting           



                                                  (N/V).           

 

     proMETHazin      2020      Yes       [id number] 25mg      Take 1           

[facility]



     e 25 mg      0-05                               tablet by           [facility] of



     tablet      00:00:                               mouth           [place]



               00                                 every 4           [facility]



                                                  (four)           [facility]



                                                  hours as           



                                                  needed for           



                                                  Nausea and           



                                                  Vomiting           



                                                  (N/V).           

 

     proMETHazin      2020      Yes       [id number] 25mg      Take 1           

[facility]



     e 25 mg      0-05                               tablet by           [facility] of



     tablet      00:00:                               mouth           [place]



               00                                 every 4           [facility]



                                                  (four)           [facility]



                                                  hours as           



                                                  needed for           



                                                  Nausea and           



                                                  Vomiting           



                                                  (N/V).           

 

     proMETHazin      2020-1      Yes       [id number] 25mg      Take 1           

[facility]



     e 25 mg      0-05                               tablet by           [facility] of



     tablet      00:00:                               mouth           [place]



               00                                 every 4           [facility]



                                                  (four)           [facility]



                                                  hours as           



                                                  needed for           



                                                  Nausea and           



                                                  Vomiting           



                                                  (N/V).           

 

     proMETHazin      2020-1      Yes       [id number] 25mg      Take 1           

[facility]



     e 25 mg      0-05                               tablet by           [facility] of



     tablet      00:00:                               mouth           [place]



               00                                 every 4           [facility]



                                                  (four)           [facility]



                                                  hours as           



                                                  needed for           



                                                  Nausea and           



                                                  Vomiting           



                                                  (N/V).           

 

     proMETHazin      2020-1      Yes       [id number] 25mg      Take 1           

[facility]



     e 25 mg      0-05                               tablet by           [facility] of



     tablet      00:00:                               mouth           [place]



               00                                 every 4           [facility]



                                                  (four)           [facility]



                                                  hours as           



                                                  needed for           



                                                  Nausea and           



                                                  Vomiting           



                                                  (N/V).           

 

     proMETHazin      2020-1      Yes       [id number] 25mg      Take 1           

[facility]



     e 25 mg      0-05                               tablet by           [facility]



     tablet      00:00:                               mouth           [place]



               00                                 every 4           [facility]



                                                  (four)           [facility]



                                                  hours as           



                                                  needed for           



                                                  Nausea and           



                                                  Vomiting           



                                                  (N/V).           

 

     proMETHazin      2020-1      Yes       [id number] 25mg      Take 1           

[facility]



     e 25 mg      0-05                               tablet by           [facility]



     tablet      00:00:                               mouth           [place]



               00                                 every 4           [facility]



                                                  (four)           [facility]



                                                  hours as           



                                                  needed for           



                                                  Nausea and           



                                                  Vomiting           



                                                  (N/V).           

 

     proMETHazin      2020-1      Yes       [id number] 25mg      Take 1           

[facility]



     e 25 mg      0-05                               tablet by           [facility]



     tablet      00:00:                               mouth           [place]



               00                                 every 4           [facility]



                                                  (four)           [facility]



                                                  hours as           



                                                  needed for           



                                                  Nausea and           



                                                  Vomiting           



                                                  (N/V).           

 

     proMETHazin      2020-1      Yes       [id number] 25mg      Take 1           

[facility]



     e 25 mg      0-05                               tablet by           [facility]



     tablet      00:00:                               mouth           [place]



               00                                 every 4           [facility]



                                                  (four)           [facility]



                                                  hours as           



                                                  needed for           



                                                  Nausea and           



                                                  Vomiting           



                                                  (N/V).           

 

     proMETHazin      2020-1      Yes       [id number] 25mg      Take 1           

[facility]



     e 25 mg      0-05                               tablet by           [facility]



     tablet      00:00:                               mouth           [place]



               00                                 every 4           [facility]



                                                  (four)           [facility]



                                                  hours as           



                                                  needed for           



                                                  Nausea and           



                                                  Vomiting           



                                                  (N/V).           

 

     proMETHazin      2020-1      Yes       [id number] 25mg      Take 1           

[facility]



     e 25 mg      0-05                               tablet by           [facility]



     tablet      00:00:                               mouth           [place]



               00                                 every 4           [facility]



                                                  (four)           [facility]



                                                  hours as           



                                                  needed for           



                                                  Nausea and           



                                                  Vomiting           



                                                  (N/V).           

 

     proMETHazin      2020-1      Yes       [id number] 25mg      Take 1           

[facility]



     e 25 mg      0-05                               tablet by           [facility] of



     tablet      00:00:                               mouth           [place]



               00                                 every 4           [facility]



                                                  (four)           [facility]



                                                  hours as           



                                                  needed for           



                                                  Nausea and           



                                                  Vomiting           



                                                  (N/V).           

 

     proMETHazin      -1 2020- No        [id number] 25mg      Take 1          

 [facility]



     e 25 mg      0-05 12-17                          tablet by           [facility] of



     tablet      00:00: 00:00                          mouth           [place]



               00   :00                           every 4           [facility]



                                                  (four)           [facility]



                                                  hours as           



                                                  needed for           



                                                  Nausea and           



                                                  Vomiting           



                                                  (N/V).           

 

     metoclopram      -0 - No             10mg      10 mg,           [facility]

[facility]



     dylan HCl                                Slow IV           [facility]



     (REGLAN)      21:45: 20:53                          Push,           [place]



     injection      00   :00                           ONCE, 1           Medical



     10 mg                                         dose, Mon           [facility]



                                                  20 at           



                                                  1645, ASAP           

 

     NaCl 0.9%      -2020- No             1000mL      at 999           [facility]

[facility]



     (NS) bolus                                mL/hr,           [facility]



     infusion      20:45: 22:02                          1,000 mL,           [facility]

as



     1,000 mL      00   :00                           IV             [facility]



                                                  Infusion,           [facility]



                                                  ONCE, 1           



                                                  dose, Mon           



                                                  20 at           



                                                  1545, ASAP           

 

     metoclopram      2020-0      Yes       [id number] 10mg      Take 1           

[facility]



     dylan HCl 10      9-21                               tablet by           [facility] 

of



     mg tablet      00:00:                               mouth           [place]



               00                                 every 6           [facility]



                                                  (six)           [facility]



                                                  hours as           



                                                  needed for           



                                                  Nausea and           



                                                  Vomiting           



                                                  (N/V).           

 

     metoclopram      2020-0      Yes       [id number] 10mg      Take 1           

[facility]



     dylan HCl 10      9-21                               tablet by           [facility] 

of



     mg tablet      00:00:                               mouth           [place]



               00                                 every 6           [facility]



                                                  (six)           [facility]



                                                  hours as           



                                                  needed for           



                                                  Nausea and           



                                                  Vomiting           



                                                  (N/V).           

 

     metoclopram      2020-0      Yes       [id number] 10mg      Take 1           

[facility]



     dylan HCl 10      9-21                               tablet by           [facility] 

of



     mg tablet      00:00:                               mouth           [place]



               00                                 every 6           [facility]



                                                  (six)           [facility]



                                                  hours as           



                                                  needed for           



                                                  Nausea and           



                                                  Vomiting           



                                                  (N/V).           

 

     metoclopram      2020-0      Yes       [id number] 10mg      Take 1           

[facility]



     dylan HCl 10      9-21                               tablet by           [facility] 

of



     mg tablet      00:00:                               mouth           [place]



               00                                 every 6           [facility]



                                                  (six)           [facility]



                                                  hours as           



                                                  needed for           



                                                  Nausea and           



                                                  Vomiting           



                                                  (N/V).           

 

     metoclopram      2020-0      Yes       [id number] 10mg      Take 1           

[facility]



     dylan HCl 10      9-21                               tablet by           [facility] 

of



     mg tablet      00:00:                               mouth           [place]



               00                                 every 6           [facility]



                                                  (six)           [facility]



                                                  hours as           



                                                  needed for           



                                                  Nausea and           



                                                  Vomiting           



                                                  (N/V).           

 

     metoclopram      2020-0      Yes       [id number] 10mg      Take 1           

[facility]



     dylan HCl 10      9-21                               tablet by           [facility] 

of



     mg tablet      00:00:                               mouth           [place]



               00                                 every 6           [facility]



                                                  (six)           [facility]



                                                  hours as           



                                                  needed for           



                                                  Nausea and           



                                                  Vomiting           



                                                  (N/V).           

 

     metoclopram      2020-0      Yes       [id number] 10mg      Take 1           

[facility]



     dylan HCl 10      9-21                               tablet by           [facility] 

of



     mg tablet      00:00:                               mouth           [place]



               00                                 every 6           [facility]



                                                  (six)           [facility]



                                                  hours as           



                                                  needed for           



                                                  Nausea and           



                                                  Vomiting           



                                                  (N/V).           

 

     metoclopram      2020-0      Yes       [id number] 10mg      Take 1           

[facility]



     dylan HCl 10      9-21                               tablet by           [facility] 

of



     mg tablet      00:00:                               mouth           [place]



               00                                 every 6           [facility]



                                                  (six)           [facility]



                                                  hours as           



                                                  needed for           



                                                  Nausea and           



                                                  Vomiting           



                                                  (N/V).           

 

     metoclopram      2020-0      Yes       [id number] 10mg      Take 1           

[facility]



     dylan HCl 10      9-21                               tablet by           [facility] 

of



     mg tablet      00:00:                               mouth           [place]



               00                                 every 6           [facility]



                                                  (six)           [facility]



                                                  hours as           



                                                  needed for           



                                                  Nausea and           



                                                  Vomiting           



                                                  (N/V).           

 

     metoclopram      2020-0      Yes       [id number] 10mg      Take 1           

[facility]



     dylan HCl 10      9-21                               tablet by           [facility] 

of



     mg tablet      00:00:                               mouth           [place]



               00                                 every 6           [facility]



                                                  (six)           [facility]



                                                  hours as           



                                                  needed for           



                                                  Nausea and           



                                                  Vomiting           



                                                  (N/V).           

 

     metoclopram      2020-0      Yes       [id number] 10mg      Take 1           

[facility]



     dylan HCl 10      9-21                               tablet by           [facility] 

of



     mg tablet      00:00:                               mouth           [place]



               00                                 every 6           [facility]



                                                  (six)           Branch



                                                  hours as           



                                                  needed for           



                                                  Nausea and           



                                                  Vomiting           



                                                  (N/V).           

 

     metoclopram      2020-0      Yes       [id number] 10mg      Take 1           

[facility]



     dylan HCl 10      9-21                               tablet by           [facility] 

of



     mg tablet      00:00:                               mouth           [place]



               00                                 every 6           [facility]



                                                  (six)           Branch



                                                  hours as           



                                                  needed for           



                                                  Nausea and           



                                                  Vomiting           



                                                  (N/V).           

 

     metoclopram      2020-0      Yes       [id number] 10mg      Take 1           

[facility]



     dylan HCl 10      9-21                               tablet by           [facility] 

of



     mg tablet      00:00:                               mouth           [place]



               00                                 every 6           [facility]



                                                  (six)           Branch



                                                  hours as           



                                                  needed for           



                                                  Nausea and           



                                                  Vomiting           



                                                  (N/V).           

 

     metoclopram      2020-0      Yes       [id number] 10mg      Take 1           

[facility]



     dylan HCl 10      9-21                               tablet by           [facility] 

of



     mg tablet      00:00:                               mouth           [place]



               00                                 every 6           [facility]



                                                  (six)           Branch



                                                  hours as           



                                                  needed for           



                                                  Nausea and           



                                                  Vomiting           



                                                  (N/V).           

 

     metoclopram      2020-0      Yes       [id number] 10mg      Take 1           

[facility]



     dylan HCl 10      9-21                               tablet by           [facility] 

[facility]



     mg tablet      00:00:                               mouth           [place]



               00                                 every 6           [facility]



                                                  (six)           Branch



                                                  hours as           



                                                  needed for           



                                                  Nausea and           



                                                  Vomiting           



                                                  (N/V).           

 

     metoclopram      2020-0      Yes       [id number] 10mg      Take 1           

[facility]



     dylan HCl 10      9-21                               tablet by           [facility] 

[facility]



     mg tablet      00:00:                               mouth           [place]



               00                                 every 6           [facility]



                                                  (six)           Branch



                                                  hours as           



                                                  needed for           



                                                  Nausea and           



                                                  Vomiting           



                                                  (N/V).           

 

     metoclopram      2020-0      Yes       [id number] 10mg      Take 1           

[facility]



     dylan HCl 10      9-21                               tablet by           [facility] 

of



     mg tablet      00:00:                               mouth           [place]



               00                                 every 6           [facility]



                                                  (six)           Branch



                                                  hours as           



                                                  needed for           



                                                  Nausea and           



                                                  Vomiting           



                                                  (N/V).           

 

     metoclopram      2020-0      Yes       [id number] 10mg      Take 1           

[facility]



     dylan HCl 10      9-21                               tablet by           [facility] 

of



     mg tablet      00:00:                               mouth           [place]



               00                                 every 6           [facility]



                                                  (six)           Branch



                                                  hours as           



                                                  needed for           



                                                  Nausea and           



                                                  Vomiting           



                                                  (N/V).           

 

     metoclopram      2020-0      Yes       [id number] 10mg      Take 1           

[facility]



     dylan HCl 10      9-21                               tablet by           [facility] 

of



     mg tablet      00:00:                               mouth           [place]



               00                                 every 6           Medical



                                                  (six)           Branch



                                                  hours as           



                                                  needed for           



                                                  Nausea and           



                                                  Vomiting           



                                                  (N/V).           

 

     metoclopram      2020-0      Yes       [id number] 10mg      Take 1           

[facility]



     dylan HCl 10      9-21                               tablet by           [facility] 

of



     mg tablet      00:00:                               mouth           [place]



               00                                 every 6           Medical



                                                  (six)           Branch



                                                  hours as           



                                                  needed for           



                                                  Nausea and           



                                                  Vomiting           



                                                  (N/V).           

 

     metoclopram      2020-0      Yes       [id number] 10mg      Take 1           

[facility]



     dylan HCl 10      9-21                               tablet by           [facility] 

of



     mg tablet      00:00:                               mouth           [place]



               00                                 every 6           Medical



                                                  (six)           Branch



                                                  hours as           



                                                  needed for           



                                                  Nausea and           



                                                  Vomiting           



                                                  (N/V).           

 

     metoclopram      2020-0      Yes       [id number] 10mg      Take 1           

[facility]



     dylan HCl 10      9-21                               tablet by           [facility] 

of



     mg tablet      00:00:                               mouth           [place]



               00                                 every 6           Medical



                                                  (six)           Branch



                                                  hours as           



                                                  needed for           



                                                  Nausea and           



                                                  Vomiting           



                                                  (N/V).           

 

     metoclopram      2020-0      Yes       [id number] 10mg      Take 1           

[facility]



     dylan HCl 10      9-21                               tablet by           [facility] 

of



     mg tablet      00:00:                               mouth           [place]



               00                                 every 6           Medical



                                                  (six)           Branch



                                                  hours as           



                                                  needed for           



                                                  Nausea and           



                                                  Vomiting           



                                                  (N/V).           

 

     metoclopram      2020-0      Yes       [id number] 10mg      Take 1           

[facility]



     dylan HCl 10      9-21                               tablet by           [facility] 

[facility]



     mg tablet      00:00:                               mouth           [place]



               00                                 every 6           [facility]



                                                  (six)           Branch



                                                  hours as           



                                                  needed for           



                                                  Nausea and           



                                                  Vomiting           



                                                  (N/V).           

 

     metoclopram      2020-0      Yes       [id number] 10mg      Take 1           

[facility]



     dylan HCl 10      9-21                               tablet by           [facility] 

[facility]



     mg tablet      00:00:                               mouth           [place]



               00                                 every 6           Medical



                                                  (six)           Branch



                                                  hours as           



                                                  needed for           



                                                  Nausea and           



                                                  Vomiting           



                                                  (N/V).           

 

     metoclopram      2020-0      Yes       [id number] 10mg      Take 1           

[facility]



     dylan HCl 10      9-21                               tablet by           [facility] 

[facility]



     mg tablet      00:00:                               mouth           [place]



               00                                 every 6           Medical



                                                  (six)           Branch



                                                  hours as           



                                                  needed for           



                                                  Nausea and           



                                                  Vomiting           



                                                  (N/V).           

 

     metoclopram      2020-0      Yes       [id number] 10mg      Take 1           

[facility]



     dylan HCl 10      9-21                               tablet by           [facility] 

of



     mg tablet      00:00:                               mouth           [place]



               00                                 every 6           Medical



                                                  (six)           Branch



                                                  hours as           



                                                  needed for           



                                                  Nausea and           



                                                  Vomiting           



                                                  (N/V).           

 

     metoclopram      2020-0      Yes       [id number] 10mg      Take 1           

[facility]



     dylan HCl 10      9-21                               tablet by           [facility] 

of



     mg tablet      00:00:                               mouth           [place]



               00                                 every 6           Medical



                                                  (six)           Branch



                                                  hours as           



                                                  needed for           



                                                  Nausea and           



                                                  Vomiting           



                                                  (N/V).           

 

     metoclopram      2020-0      Yes       [id number] 10mg      Take 1           

[facility]



     dylan HCl 10      9-21                               tablet by           [facility] 

of



     mg tablet      00:00:                               mouth           [place]



               00                                 every 6           Medical



                                                  (six)           Branch



                                                  hours as           



                                                  needed for           



                                                  Nausea and           



                                                  Vomiting           



                                                  (N/V).           

 

     metoclopram      2020-0      Yes       [id number] 10mg      Take 1           

[facility]



     dylan HCl 10      9-21                               tablet by           [facility] 

[facility]



     mg tablet      00:00:                               mouth           [place]



               00                                 every 6           Medical



                                                  (six)           Branch



                                                  hours as           



                                                  needed for           



                                                  Nausea and           



                                                  Vomiting           



                                                  (N/V).           

 

     metoclopram      2020-0      Yes       [id number] 10mg      Take 1           

[facility]



     dylan HCl 10      9-21                               tablet by           [facility] 

[facility]



     mg tablet      00:00:                               mouth           [place]



               00                                 every 6           Medical



                                                  (six)           Branch



                                                  hours as           



                                                  needed for           



                                                  Nausea and           



                                                  Vomiting           



                                                  (N/V).           

 

     metoclopram      2020-0      Yes       [id number] 10mg      Take 1           

[facility]



     dylan HCl 10      9-21                               tablet by           [facility] 

[facility]



     mg tablet      00:00:                               mouth           [place]



               00                                 every 6           Medical



                                                  (six)           Branch



                                                  hours as           



                                                  needed for           



                                                  Nausea and           



                                                  Vomiting           



                                                  (N/V).           

 

     metoclopram      2020-0      Yes       [id number] 10mg      Take 1           

[facility]



     dylan HCl 10      9-21                               tablet by           [facility] 

[facility]



     mg tablet      00:00:                               mouth           [place]



               00                                 every 6           Medical



                                                  (six)           Branch



                                                  hours as           



                                                  needed for           



                                                  Nausea and           



                                                  Vomiting           



                                                  (N/V).           

 

     metoclopram      2020-0      Yes       [id number] 10mg      Take 1           

[facility]



     dylan HCl 10      9-21                               tablet by           [facility] 

[facility]



     mg tablet      00:00:                               mouth           [place]



               00                                 every 6           Medical



                                                  (six)           Branch



                                                  hours as           



                                                  needed for           



                                                  Nausea and           



                                                  Vomiting           



                                                  (N/V).           

 

     metoclopram      2020-0      Yes       [id number] 10mg      Take 1           

[facility]



     dylan HCl 10      9-21                               tablet by           [facility] 

[facility]



     mg tablet      00:00:                               mouth           [place]



               00                                 every 6           Medical



                                                  (six)           Branch



                                                  hours as           



                                                  needed for           



                                                  Nausea and           



                                                  Vomiting           



                                                  (N/V).           

 

     metoclopram      2020-0      Yes       [id number] 10mg      Take 1           

[facility]



     dylan HCl 10      9-21                               tablet by           [facility] 

[facility]



     mg tablet      00:00:                               mouth           [place]



               00                                 every 6           Medical



                                                  (six)           Branch



                                                  hours as           



                                                  needed for           



                                                  Nausea and           



                                                  Vomiting           



                                                  (N/V).           

 

     metoclopram      2020-0      Yes       [id number] 10mg      Take 1           

[facility]



     dylan HCl 10      9-21                               tablet by           [facility] 

[facility]



     mg tablet      00:00:                               mouth           [place]



               00                                 every 6           Medical



                                                  (six)           Branch



                                                  hours as           



                                                  needed for           



                                                  Nausea and           



                                                  Vomiting           



                                                  (N/V).           

 

     metoclopram      2020-0      Yes       [id number] 10mg      Take 1           

[facility]



     dylan HCl 10      9-21                               tablet by           [facility] 

[facility]



     mg tablet      00:00:                               mouth           [place]



               00                                 every 6           Medical



                                                  (six)           Branch



                                                  hours as           



                                                  needed for           



                                                  Nausea and           



                                                  Vomiting           



                                                  (N/V).           

 

     metoclopram      2020-0      Yes       [id number] 10mg      Take 1           

[facility]



     dylan HCl 10      9-21                               tablet by           [facility] 

of



     mg tablet      00:00:                               mouth           [place]



               00                                 every 6           Medical



                                                  (six)           Branch



                                                  hours as           



                                                  needed for           



                                                  Nausea and           



                                                  Vomiting           



                                                  (N/V).           







Immunizations







           Ordered    Filled Immunization Date       Status     Comments   [facility]

e



           Immunization Name Name                                        

 

           HPV9                  2021 Completed             [facility] 



                                 00:00:00                         [facility]

 

           HPV9                  2021 Completed             [facility]



                                 00:00:00                         [facility]

 

           HPV9                  2021 Completed             [facility]



                                 00:00:00                         [facility]



                                                                  [facility]

 

           HPV9                  2021 Completed             [facility]



                                 00:00:00                         [place] [facility]



                                                                  [facility]

 

           HPV9                  2021 Completed             [facility]



                                 00:00:00                         [place] [facility]



                                                                  [facility]

 

           HPV9                  2021 Completed             [facility]



                                 00:00:00                         [facility]



                                                                  [facility]

 

           HPV9                  2021 Completed             [facility]



                                 00:00:00                         [facility]



                                                                  [facility]

 

           HPV9                  2021 Completed             [facility]



                                 00:00:00                         [facility]



                                                                  [facility]

 

           HPV9                  2021 Completed             [facility]



                                 00:00:00                         [facility]



                                                                  [facility]

 

           HPV9                  2021 Completed             [facility]



                                 00:00:00                         [facility]



                                                                  [facility]

 

           HPV9                  2021 Completed             [facility]



                                 00:00:00                         [facility]



                                                                  [facility]

 

           HPV9                  2021 Completed             [facility]



                                 00:00:00                         [facility]



                                                                  [facility]

 

           HPV9                  2021 Completed             [facility]



                                 00:00:00                         [facility]



                                                                  [facility]

 

           HPV9                  2021 Completed             [facility]



                                 00:00:00                         [facility]



                                                                  [facility]

 

           HPV9                  2021 Completed             [facility]



                                 00:00:00                         [facility]



                                                                  [facility]

 

           HPV9                  2021 Completed             [facility]



                                 00:00:00                         [facility]



                                                                  [facility]

 

           HPV9                  2021 Completed             [facility]



                                 00:00:00                         [facility]



                                                                  [facility]

 

           HPV9                  2021 Completed             [facility]



                                 00:00:00                         [facility]



                                                                  [facility]

 

           HPV9                  2021 Completed             [facility]



                                 00:00:00                         [facility]



                                                                  [facility]

 

           HPV9                  2021 Completed             [facility]



                                 00:00:00                         [facility]



                                                                  [facility]

 

           HPV9                  2021 Completed             [facility]



                                 00:00:00                         [facility]



                                                                  [facility]

 

           HPV9                  2021 Completed             [facility]



                                 00:00:00                         [facility]



                                                                  [facility]

 

           HPV9                  2021 Completed             [facility]



                                 00:00:00                         [facility]



                                                                  [facility]

 

           HPV9                  2021 Completed             [facility]



                                 00:00:00                         [facility]



                                                                  [facility]

 

           HPV9                  2021 Completed             [facility]



                                 00:00:00                         [facility]



                                                                  [facility]

 

           HPV9                  2021 Completed             [facility]



                                 00:00:00                         [facility]



                                                                  Branch

 

           TDAP                  2021 Completed             [facility]



                                 00:00:00                         [facility]



                                                                  Branch

 

           TDAP                  2021 Completed             [facility]



                                 00:00:00                         [facility]



                                                                  [facility]

 

           TDAP                  2021 Completed             [facility]



                                 00:00:00                         [facility]



                                                                  [facility]

 

           TDAP                  2021 Completed             [facility]



                                 00:00:00                         [facility]



                                                                  [facility]

 

           TDAP                  2021 Completed             [facility]



                                 00:00:00                         [facility]



                                                                  [facility]

 

           TDAP                  2021 Completed             [facility]



                                 00:00:00                         [facility]



                                                                  [facility]

 

           TDAP                  2021 Completed             [facility]



                                 00:00:00                         [facility]



                                                                  [facility]

 

           TDAP                  2021 Completed             [facility]



                                 00:00:00                         [facility]



                                                                  Br[facility]

 

           TDAP                  2021 Completed             [facility]



                                 00:00:00                         [facility]



                                                                  [facility]

 

           TDAP                  2021 Completed             [facility]



                                 00:00:00                         [facility]



                                                                  [facility]

 

           TDAP                  2021 Completed             [facility]



                                 00:00:00                         [facility]



                                                                  [facility]

 

           TDAP                  2021 Completed             [facility]



                                 00:00:00                         [facility]



                                                                  [facility]

 

           TDAP                  2021 Completed             [facility]



                                 00:00:00                         [facility]

 

           TDAP                  2021 Completed             [facility]



                                 00:00:00                         [facility]

 

           TDAP                  2021 Completed             [facility]



                                 00:00:00                         [facility]



                                                                  Br[facility]

 

           TDAP                  2021 Completed             [facility]



                                 00:00:00                         [facility]

 

           TDAP                  2021 Completed             [facility]



                                 00:00:00                         [facility]



                                                                  Br[facility]

 

           TDAP                  2021 Completed             [facility]



                                 00:00:00                         [facility]



                                                                  [facility]

 

           TDAP                  2021 Completed             [facility]



                                 00:00:00                         [facility]

 

           TDAP                  2021 Completed             [facility]



                                 00:00:00                         [facility]

 

           TDAP                  2021 Completed             [facility]



                                 00:00:00                         [facility]



                                                                  [facility]

 

           TDAP                  2021 Completed             [facility]



                                 00:00:00                         [facility]

 

           TDAP                  2021 Completed             [facility]



                                 00:00:00                         [facility]

 

           TDAP                  2021 Completed             [facility]



                                 00:00:00                         [facility]

 

           TDAP                  2021 Completed             [facility]



                                 00:00:00                         [facility]

 

           TDAP                  2021 Completed             [facility]



                                 00:00:00                         [facility]

 

           TDAP                  2021 Completed             [facility]



                                 00:00:00                         [facility]

 

           TDAP                  2021 Completed             [facility]



                                 00:00:00                         [facility]

 

           TDAP                  2021 Completed             [facility]



                                 00:00:00                         [facility]



                                                                  [facility]

 

           Influenza Virus            2020-10-05 Completed             [facility]

[facility]



           Vaccine Quad .5 mL            00:00:00                         [facility]



           [facility]                                               [facility]

 

           Influenza Virus            2020-10-05 Completed             [facility]

[facility]



           Vaccine Quad .5 mL            00:00:00                         [facility]



           [facility]                                               [facility]

 

           Influenza Virus            2020-10-05 Completed             [facility]

[facility]



           Vaccine Quad .5 mL            00:00:00                         [facility]



           [facility]                                               [facility]

 

           Influenza Virus            2020-10-05 Completed             [facility]

[facility]



           Vaccine Quad .5 mL            00:00:00                         [facility]



           [facility]                                               [facility]

 

           Influenza Virus            2020-10-05 Completed             [facility]

[facility]



           Vaccine Quad .5 mL            00:00:00                         [facility]



           [facility]                                               [facility]

 

           Influenza Virus            2020-10-05 Completed             [facility]

[facility]



           Vaccine Quad .5 mL            00:00:00                         [facility]



           [facility]                                               [facility]

 

           Influenza Virus            2020-10-05 Completed             [facility]

[facility]



           Vaccine Quad .5 mL            00:00:00                         [facility]



           [facility]                                               [facility]

 

           Influenza Virus            2020-10-05 Completed             [facility]

[facility]



           Vaccine Quad .5 mL            00:00:00                         [facility]



           [facility]                                               [facility]

 

           Influenza Virus            2020-10-05 Completed             [facility]

[facility]



           Vaccine Quad .5 mL            00:00:00                         [facility]



           [facility]                                               [facility]

 

           Influenza Virus            2020-10-05 Completed             [facility]

[facility]



           Vaccine Quad .5 mL            00:00:00                         [facility]



           [facility]                                               [facility]

 

           Influenza Virus            2020-10-05 Completed             [facility]

[facility]



           Vaccine Quad .5 mL            00:00:00                         [facility]



           [facility]                                               [facility]

 

           Influenza Virus            2020-10-05 Completed             [facility]

[facility]



           Vaccine Quad .5 mL            00:00:00                         [facility]



           [facility]                                               [facility]

 

           Influenza Virus            2020-10-05 Completed             [facility]

[facility]



           Vaccine Quad .5 mL            00:00:00                         [facility]



           [facility]                                               [facility]

 

           Influenza Virus            2020-10-05 Completed             [facility]

[facility]



           Vaccine Quad .5 mL            00:00:00                         [facility]



           [facility]                                               [facility]

 

           Influenza Virus            2020-10-05 Completed             [facility]

[facility]



           Vaccine Quad .5 mL            00:00:00                         [facility]



           [facility]                                               [facility]

 

           Influenza Virus            2020-10-05 Completed             [facility]

[facility]



           Vaccine Quad .5 mL            00:00:00                         [facility]



           [facility]                                               [facility]

 

           Influenza Virus            2020-10-05 Completed             [facility]

[facility]



           Vaccine Quad .5 mL            00:00:00                         [facility]



           [facility]                                               [facility]

 

           Influenza Virus            2020-10-05 Completed             [facility]

[facility]



           Vaccine Quad .5 mL            00:00:00                         [facility]



           [facility]                                               [facility]

 

           Influenza Virus            2020-10-05 Completed             [facility]

[facility]



           Vaccine Quad .5 mL            00:00:00                         [facility]



           [facility]                                               [facility]

 

           Influenza Virus            2020-10-05 Completed             [facility]

[facility]



           Vaccine Quad .5 mL            00:00:00                         [facility]



           [facility]                                               [facility]

 

           Influenza Virus            2020-10-05 Completed             [facility]

[facility]



           Vaccine Quad .5 mL            00:00:00                         [facility]



           [facility]                                               [facility]

 

           Influenza Virus            2020-10-05 Completed             [facility]

[facility]



           Vaccine Quad .5 mL            00:00:00                         [facility]



           [facility]                                               [facility]

 

           Influenza Virus            2020-10-05 Completed             [facility]

[facility]



           Vaccine Quad .5 mL            00:00:00                         [facility]



           [facility]                                               [facility]

 

           Influenza Virus            2020-10-05 Completed             [facility]

[facility]



           Vaccine Quad .5 mL            00:00:00                         [facility]



           [facility]                                               [facility]

 

           Influenza Virus            2020-10-05 Completed             [facility]

[facility]



           Vaccine Quad .5 mL            00:00:00                         [facility]



           [facility]                                               [facility]

 

           Influenza Virus            2020-10-05 Completed             [facility]

[facility]



           Vaccine Quad .5 mL            00:00:00                         [facility]



           [facility]                                               [facility]

 

           Influenza Virus            2020-10-05 Completed             [facility]

[facility]



           Vaccine Quad .5 mL            00:00:00                         [facility]



           [facility]                                               [facility]

 

           Influenza Virus            2020-10-05 Completed             [facility]

[facility]



           Vaccine Quad .5 mL            00:00:00                         [facility]



           [facility]                                               [facility]

 

           Influenza Virus            2020-10-05 Completed             [facility]

[facility]



           Vaccine Quad .5 mL            00:00:00                         [facility]



           [facility]                                               [facility]

 

           Influenza Virus            2020-10-05 Completed             [facility]

[facility]



           Vaccine Quad .5 mL            00:00:00                         [facility]



           [facility]                                               [facility]

 

           Influenza Virus            2020-10-05 Completed             [facility]

[facility]



           Vaccine Quad .5 mL            00:00:00                         [facility]



           [facility]                                               [facility]

 

           Influenza Virus            2020-10-05 Completed             [facility]

[facility]



           Vaccine Quad .5 mL            00:00:00                         [facility]



           [facility]                                               [facility]

 

           Influenza Virus            2020-10-05 Completed             [facility]

[facility]



           Vaccine Quad .5 mL            00:00:00                         [facility]



           [facility]                                               [facility]

 

           Influenza Virus            2020-10-05 Completed             [facility]

[facility]



           Vaccine Quad .5 mL            00:00:00                         [facility]



           [facility]                                               [facility]

 

           Influenza Virus            2020-10-05 Completed             [facility]

[facility]



           Vaccine Quad .5 mL            00:00:00                         [facility]



           [facility]                                               [facility]

 

           Influenza Virus            2020-10-05 Completed             [facility]

[facility]



           Vaccine Quad .5 mL            00:00:00                         [facility]



           [facility]                                               [facility]

 

           Influenza Virus            2020-10-05 Completed             [facility]

[facility]



           Vaccine Quad .5 mL            00:00:00                         [facility]



           [facility]                                               [facility]

 

           Influenza Virus            2020-10-05 Completed             [facility]

[facility]



           Vaccine Quad .5 mL            00:00:00                         [facility]



           [facility]                                               [facility]







Vital Signs







             Vital Name   Observation Time Observation Value Comments     Source

 

             Systolic blood 2021-10-18 20:15:00 137 mm[Hg]                [facility]

[facility]



             pressure                                            [facility]



                                                                 Branch

 

             Diastolic blood 2021-10-18 20:15:00 90 mm[Hg]                 [facility]

[facility]



             pressure                                            [facility]



                                                                 Branch

 

             Heart rate   2021-10-18 20:13:00 71 /min                   [facility]

[facility]



                                                                 [facility]



                                                                 [place]

 

             Body temperature 2021-10-18 20:13:00 37 [place]ileth                    [facility]

ers[facility]



                                                                 [facility]



                                                                 [facility]

 

             Respiratory rate 2021-10-18 20:13:00 16 /min                   [facility]

ers[facility] of



                                                                 [facility]



                                                                 [facility]

 

             Body height  2021-10-18 20:13:00 154.9 cm                  [facility]

[facility] of



                                                                 [facility]



                                                                 [facility]

 

             Body weight  2021-10-18 20:13:00 81.92 kg                  [facility]

[facility] of



                                                                 [facility]



                                                                 [facility]

 

             BMI          2021-10-18 20:13:00 34.12 kg/m2               [facility]

[facility] of



                                                                 [facility]



                                                                 [facility]

 

             Systolic blood 2021 20:58:00 119 mm[Hg]                [facility]

[facility]



             pressure                                            [facility]



                                                                 [facility]

 

             Diastolic blood 2021 20:58:00 75 mm[Hg]                 [facility]

[facility]



             pressure                                            [facility]



                                                                 [facility]

 

             Heart rate   2021 20:58:00 76 /min                   [facility]

[facility] of



                                                                 [facility]



                                                                 [facility]

 

             Body temperature 2021 20:58:00 36.56 Yamileth                 [facility]

ers[facility] of



                                                                 [facility]



                                                                 [facility]

 

             Respiratory rate 2021 20:58:00 16 /min                   Univ

ers[facility] of



                                                                 [facility]



                                                                 [facility]

 

             Body height  2021 20:58:00 154.9 cm                  [facility]

[facility]



                                                                 [facility]



                                                                 [facility]

 

             Body weight  2021 20:58:00 80.105 kg                 [facility]

[facility]



                                                                 [facility]



                                                                 [facility]

 

             BMI          2021 20:58:00 33.37 kg/m2               [facility]

[facility] of



                                                                 [facility]



                                                                 [facility]

 

             Systolic blood 2021 20:35:00 127 mm[Hg]                [facility]

sity of



             pressure                                            [facility]



                                                                 [facility]

 

             Diastolic blood 2021 20:35:00 89 mm[Hg]                 [facility]ive

rsity of



             pressure                                            [facility]



                                                                 [facility]

 

             Heart rate   2021 20:35:00 56 /min                   [facility]

[facility] of



                                                                 [facility]



                                                                 [facility]

 

             Body temperature 2021 20:35:00 37 Yamileth                    [facility]

ersity of



                                                                 [facility]



                                                                 [facility]

 

             Respiratory rate 2021 20:35:00 16 /min                   [facility]

ersity of



                                                                 [facility]



                                                                 [facility]

 

             Body height  2021 20:35:00 152.4 cm                  [facility]

[facility] of



                                                                 [facility]



                                                                 [facility]

 

             Body weight  2021 20:35:00 79.198 kg                 [facility]

[facility] of



                                                                 [facility]



                                                                 [facility]

 

             BMI          2021 20:35:00 34.10 kg/m2               [facility]

ty of



                                                                 [facility]



                                                                 [facility]

 

             Systolic blood 2021 18:15:00 135 mm[Hg]                [facility]

sity of



             pressure                                            [facility]



                                                                 [facility]

 

             Diastolic blood 2021 18:15:00 88 mm[Hg]                 [facility]

rsity of



             pressure                                            [facility]



                                                                 [facility]

 

             Heart rate   2021 18:15:00 65 /min                   [facility]

[facility] of



                                                                 [facility]



                                                                 [facility]

 

             Body temperature 2021 18:15:00 36.89 Yamileth                 [facility]

ersity of



                                                                 [facility]



                                                                 [facility]

 

             Respiratory rate 2021 18:15:00 16 /min                   [facility]

ersity of



                                                                 [facility]



                                                                 [facility]

 

             Body height  2021 18:15:00 152.4 cm                  [facility]

[facility] of



                                                                 [facility]



                                                                 [facility]

 

             Body weight  2021 18:15:00 76.794 kg                 [facility]

[facility] of



                                                                 [facility]



                                                                 [facility]

 

             BMI          2021 18:15:00 33.06 kg/m2               [facility]

ty of



                                                                 [facility]



                                                                 [facility]

 

             Systolic blood 2021-05-10 18:25:00 122 mm[Hg]                [facility]

sity of



             pressure                                            [facility]



                                                                 [facility]

 

             Diastolic blood 2021-05-10 18:25:00 75 mm[Hg]                 [facility]

rsity of



             pressure                                            [facility]



                                                                 [facility]

 

             Heart rate   2021-05-10 18:25:00 77 /min                   [facility]

ty of



                                                                 [facility]



                                                                 [facility]

 

             Body temperature 2021-05-10 18:25:00 36.72 Yamileth                 [facility]

ersity of



                                                                 [facility]



                                                                 [facility]

 

             Respiratory rate 2021-05-10 18:25:00 16 /min                   Univ

ersity of



                                                                 [facility]

 

             Body height  2021-05-10 18:25:00 152.4 cm                  [facility]

ty of



                                                                 [facility]



                                                                 [place]

 

             Body weight  2021-05-10 18:25:00 95.709 kg                 [facility]

ty of



                                                                 [facility]



                                                                 [facility]

 

             BMI          2021-05-10 18:25:00 41.21 kg/m2               [facility]

ty of



                                                                 [facility]

 

             Systolic blood 2021 00:30:00 115 mm[Hg]                Un[facility]

sity of



             pressure                                            [facility]

 

             Diastolic blood 2021 00:30:00 62 mm[Hg]                 Unive

rsity of



             pressure                                            [facility]

 

             Heart rate   2021 00:30:00 89 /min                   [facility]

ty of



                                                                 [facility]

 

             Body temperature 2021 00:30:00 37.06 Yamileth                 Un[facility]

ersity of



                                                                 [facility]

 

             Respiratory rate 2021 00:30:00 18 /min                   Univ

ersity of



                                                                 [facility]

 

             Oxygen saturation in 2021 00:30:00 97 /min                   

[facility] 



             Arterial blood by                                        [facility]

[facility]



             Pulse oximetry                                        [facility]

 

             Body height  2021 13:13:00 154.9 cm                  [facility]

ty of



                                                                 [facility]

 

             Body weight  2021 13:13:00 95.754 kg                 [facility]

ty of



                                                                 [facility]

 

             BMI          2021 13:13:00 39.91 kg/m2               [facility]

ty of



                                                                 [facility]

 

             Systolic blood 2021 21:41:00 138 mm[Hg]                Univer

sity of



             pressure                                            [facility]

 

             Diastolic blood 2021 21:41:00 72 mm[Hg]                 Unive

rsity of



             pressure                                            [facility]

 

             Heart rate   2021 21:21:00 105 /min                  [facility]

ty of



                                                                 [facility]

 

             Body temperature 2021 21:19:00 36.94 Yamileth                 Univ

ersity of



                                                                 [facility]



                                                                 Branch

 

             Respiratory rate 2021 21:19:00 16 /min                   Univ

ersity of



                                                                 [facility]



                                                                 Branch

 

             Body height  2021 21:19:00 154.9 cm                  [facility]

ty of



                                                                 [facility]

 

             Body weight  2021 21:19:00 94.036 kg                 [facility]

ty of



                                                                 [facility]



                                                                 [facility]anch

 

             BMI          2021 21:19:00 39.17 kg/m2               [facility]

ty of



                                                                 [facility]



                                                                 [facility]

 

             Systolic blood 2021 19:30:00 134 mm[Hg]                [facility]

sity of



             pressure                                            [facility]



                                                                 [facility]

 

             Diastolic blood 2021 19:30:00 80 mm[Hg]                 [facility]

rsity of



             pressure                                            [facility]



                                                                 [facility]

 

             Heart rate   2021 19:29:00 82 /min                   [facility]

ty of



                                                                 [facility]



                                                                 [facility]

 

             Body temperature 2021 19:29:00 36.67 Yamileth                 [facility]

ersity of



                                                                 [facility]



                                                                 [facility]

 

             Respiratory rate 2021 19:29:00 16 /min                   [facility]

ersity of



                                                                 [facility]



                                                                 [facility]

 

             Body height  2021 19:29:00 154.9 cm                  [facility]

ty of



                                                                 [facility]



                                                                 [facility]

 

             Body weight  2021 19:29:00 90.379 kg                 [facility]

ty of



                                                                 [facility]



                                                                 [facility]

 

             BMI          2021 19:29:00 37.65 kg/m2               [facility]

ty of



                                                                 [facility]



                                                                 [facility]

 

             Systolic blood 2021 20:26:00 133 mm[Hg]                [facility]

sity of



             pressure                                            [facility]



                                                                 [facility]

 

             Diastolic blood 2021 20:26:00 89 mm[Hg]                 [facility]

rsity of



             pressure                                            [facility]



                                                                 [facility]

 

             Heart rate   2021 20:26:00 74 /min                   [facility]

ty of



                                                                 [facility]



                                                                 [facility]

 

             Body temperature 2021 20:26:00 36.67 Yamileth                 [facility]

ersity of



                                                                 [facility]



                                                                 [facility]

 

             Respiratory rate 2021 20:26:00 16 /min                   [facility]

ersity of



                                                                 [facility]



                                                                 [facility]

 

             Body height  2021 20:26:00 160 cm                    [facility]

[facility] of



                                                                 [facility]



                                                                 [facility]

 

             Body weight  2021 20:26:00 90.464 kg                 [facility]

ty of



                                                                 [facility]



                                                                 [facility]

 

             BMI          2021 20:26:00 35.33 kg/m2               [facility]

ty of



                                                                 [facility]



                                                                 [facility]

 

             Systolic blood 2021-03-10 21:03:00 138 mm[Hg]                [facility]

sity of



             pressure                                            [facility]



                                                                 [facility]

 

             Diastolic blood 2021-03-10 21:03:00 78 mm[Hg]                 [facility]

rsity of



             pressure                                            [facility]



                                                                 [facility]

 

             Heart rate   2021-03-10 21:02:00 87 /min                   [facility]

ty of



                                                                 [facility]



                                                                 [facility]

 

             Body temperature 2021-03-10 21:02:00 36.89 Yamileth                 [facility]

ersity of



                                                                 [facility]



                                                                 [facility]

 

             Respiratory rate 2021-03-10 21:02:00 16 /min                   Univ

ersity of



                                                                 [facility]



                                                                 [facility]

 

             Body height  2021-03-10 21:02:00 160 cm                    [facility]

ty of



                                                                 [facility]



                                                                 [facility]

 

             Body weight  2021-03-10 21:02:00 89.926 kg                 [facility]

ty of



                                                                 [facility]



                                                                 [facility]

 

             BMI          2021-03-10 21:02:00 35.12 kg/m2               [facility]

ty of



                                                                 [facility]



                                                                 [facility]

 

             Systolic blood 2021 20:00:00 135 mm[Hg]                Univer

sity of



             pressure                                            [facility]



                                                                 [facility]anch

 

             Diastolic blood 2021 20:00:00 86 mm[Hg]                 Unive

rsity of



             pressure                                            [facility]



                                                                 [facility]anch

 

             Heart rate   2021 20:00:00 83 /min                   [facility]

ty of



                                                                 [facility]

 

             Respiratory rate 2021 20:00:00 18 /min                   Univ

ersity of



                                                                 [facility]

 

             Oxygen saturation in 2021 20:00:00 99 /min                   

[facility]



             Arterial blood by                                        [facility]

lo



             Pulse oximetry                                        [facility]

 

             Body temperature 2021 19:23:00 37.06 Yamileth                 Univ

ersity of



                                                                 [facility]



                                                                 [facility]anch

 

             Body weight  2021 19:23:00 89.812 kg                 [facility]

ty of



                                                                 [facility]



                                                                 [facility]

 

             BMI          2021 19:23:00 37.41 kg/m2               [facility]

ty of



                                                                 [facility]



                                                                 [facility]

 

             Systolic blood 2021 19:49:00 129 mm[Hg]                Univer

sity of



             pressure                                            [facility]



                                                                 [facility]

 

             Diastolic blood 2021 19:49:00 81 mm[Hg]                 Unive

rsity of



             pressure                                            [facility]



                                                                 [facility]

 

             Heart rate   2021 19:49:00 77 /min                   [facility]

ty of



                                                                 [facility]



                                                                 [facility]

 

             Body temperature 2021 19:49:00 36.61 Yamileth                 Univ

ersity of



                                                                 [facility]



                                                                 [facility]

 

             Respiratory rate 2021 19:49:00 16 /min                   Univ

ersity of



                                                                 [facility]



                                                                 [facility]

 

             Body height  2021 19:49:00 154.9 cm                  [facility]

ty of



                                                                 [facility]

 

             Body weight  2021 19:49:00 90.22 kg                  [facility]

ty of



                                                                 [facility]



                                                                 [facility]

 

             BMI          2021 19:49:00 37.58 kg/m2               [facility]

ty of



                                                                 [facility]



                                                                 [facility]

 

             Systolic blood 2021 16:46:00 105 mm[Hg]                [facility]

sity of



             pressure                                            [facility]



                                                                 [facility]

 

             Diastolic blood 2021 16:46:00 66 mm[Hg]                 Unive

rsity of



             pressure                                            [facility]



                                                                 [facility]

 

             Heart rate   2021 16:46:00 78 /min                   [facility]

[facility] of



                                                                 [facility]



                                                                 [facility]

 

             Body temperature 2021 16:46:00 36.72 Yamileth                 [facility]

ersity of



                                                                 [facility]



                                                                 [facility]

 

             Respiratory rate 2021 16:46:00 16 /min                   [facility]iv

ersity of



                                                                 [facility]



                                                                 [facility]

 

             Body height  2021 16:46:00 154.9 cm                  [facility]

[facility] of



                                                                 [facility]



                                                                 [facility]

 

             Body weight  2021 16:46:00 88.055 kg                 [facility]

ty of



                                                                 [facility]



                                                                 [facility]

 

             BMI          2021 16:46:00 36.68 kg/m2               [facility]

ty of



                                                                 [facility]



                                                                 [facility]

 

             Systolic blood 2021 14:44:00 127 mm[Hg]                [facility]

sity of



             pressure                                            [facility]



                                                                 [facility]

 

             Diastolic blood 2021 14:44:00 74 mm[Hg]                 Unive

rsity of



             pressure                                            [facility]



                                                                 [facility]

 

             Heart rate   2021 14:44:00 79 /min                   [facility]

[facility] of



                                                                 [facility]



                                                                 [facility]

 

             Body temperature 2021 14:44:00 36.44 Yamileth                 [facility]

ersity of



                                                                 [facility]



                                                                 [facility]

 

             Respiratory rate 2021 14:44:00 16 /min                   [facility]

ersity of



                                                                 [facility]



                                                                 [facility]

 

             Body height  2021 14:44:00 154.9 cm                  [facility]

[facility] of



                                                                 [facility]



                                                                 [facility]

 

             Body weight  2021 14:44:00 86.456 kg                 [facility]

ty of



                                                                 [facility]



                                                                 [facility]

 

             BMI          2021 14:44:00 36.01 kg/m2               [facility]

ty of



                                                                 [facility]



                                                                 [facility]

 

             Systolic blood 2020 15:46:00 98 mm[Hg]                 Univer

sity of



             pressure                                            [facility]



                                                                 [facility]

 

             Diastolic blood 2020 15:46:00 53 mm[Hg]                 Unive

rsity of



             pressure                                            [facility]



                                                                 Branch

 

             Heart rate   2020 15:46:00 61 /min                   Universi

ty of



                                                                 [facility]



                                                                 Branch

 

             Body temperature 2020 15:46:00 36.61 Yamileth                 Univ

ersity of



                                                                 [facility]



                                                                 Branch

 

             Respiratory rate 2020 15:46:00 16 /min                   Univ

ersity of



                                                                 [facility]



                                                                 Branch

 

             Body height  2020 15:46:00 154.9 cm                  Universi

ty of



                                                                 [facility]



                                                                 Branch

 

             Body weight  2020 15:46:00 86.955 kg                 Universi

ty of



                                                                 [facility]



                                                                 Branch

 

             BMI          2020 15:46:00 36.22 kg/m2               Universi

ty of



                                                                 [facility]



                                                                 Branch

 

             Systolic blood 2020 19:41:00 111 mm[Hg]                Univer

sity of



             pressure                                            [facility]



                                                                 Branch

 

             Diastolic blood 2020 19:41:00 58 mm[Hg]                 Unive

rsity of



             pressure                                            [facility]



                                                                 Branch

 

             Heart rate   2020 19:41:00 53 /min                   Universi

ty of



                                                                 [facility]



                                                                 Branch

 

             Body temperature 2020 19:41:00 37.28 Yamileth                 Univ

ersity of



                                                                 [facility]



                                                                 Branch

 

             Respiratory rate 2020 19:41:00 16 /min                   Univ

ersity of



                                                                 [facility]



                                                                 [facility]anch

 

             Body height  2020 19:41:00 154.9 cm                  Universi

ty of



                                                                 [facility]



                                                                 Branch

 

             Body weight  2020 19:41:00 87 kg                     Universi

ty of



                                                                 [facility]



                                                                 Branch

 

             BMI          2020 19:41:00 36.24 kg/m2               Universi

ty of



                                                                 [facility]



                                                                 Branch

 

             Systolic blood 2020-10-05 14:14:00 125 mm[Hg]                Univer

sity of



             pressure                                            [facility]



                                                                 Branch

 

             Diastolic blood 2020-10-05 14:14:00 60 mm[Hg]                 Unive

rsity of



             pressure                                            [facility]



                                                                 Branch

 

             Heart rate   2020-10-05 14:14:00 63 /min                   Universi

ty of



                                                                 [facility]



                                                                 Branch

 

             Body temperature 2020-10-05 14:14:00 37 Yamileth                    Univ

ersity of



                                                                 [facility]



                                                                 Branch

 

             Respiratory rate 2020-10-05 14:14:00 16 /min                   Univ

ersity of



                                                                 [facility]



                                                                 Branch

 

             Body height  2020-10-05 14:14:00 154.9 cm                  Universi

ty of



                                                                 [facility]



                                                                 Branch

 

             Body weight  2020-10-05 14:14:00 85.957 kg                 Universi

ty of



                                                                 [facility]



                                                                 Branch

 

             BMI          2020-10-05 14:14:00 35.81 kg/m2               Universi

ty of



                                                                 [facility]



                                                                 Branch

 

             Systolic blood 2020 22:15:00 112 mm[Hg]                Univer

sity of



             pressure                                            [facility]



                                                                 Branch

 

             Diastolic blood 2020 22:15:00 65 mm[Hg]                 [facility]

rsity of



             pressure                                            [facility]

 

             Heart rate   2020 22:15:00 56 /min                   [facility]

 

             Respiratory rate 2020 22:15:00 18 /min                   [facility]

 

             Oxygen saturation in 2020 22:15:00 100 /min                  

[facility]



             Arterial blood by                                        [facility]

lo



             Pulse oximetry                                        [facility]

 

             Body temperature 2020 20:20:00 37.11 Yamileth                 [facility]

 

             Body height  2020 20:20:00 154.9 cm                  [facility]

 

             Body weight  2020 20:20:00 81.647 kg                 [facility]

 

             BMI          2020 20:20:00 34.01 kg/m2               [facility]







Procedures







                Procedure       Date / Time     Performing Clinician Source



                                Performed                       

 

                ASSIGNMENT OF BENEFITS 2021-10-18 19:59:42 Doctor Unassigned, No

 [facility]



                                                Name            [facility]

 

                GARDASIL 9 (HPV 9V) 2021 21:21:22 Donna Renner [facility]

 

                GARDASIL 9 (HPV 9V) 2021 20:59:38 Nneka López [facility]

 

                CBC WITH DIFF   2021 11:40:00 Dolores [facility]

 

                 SECTION 2021 01:43:00 Rudy Sneed [facility]

 

                URINALYSIS      2021 23:59:00 [facility]

 

                PROTEIN CREAT RATIO 2021 23:59:00 Zaria Medina  [facility] [place]



                URINE RANDOM                                    [facility]

 

                SGOT (ASPARTATE AMINO 2021 23:51:00 Zaria Medina  [facility]

[facility] [place]



                TRANSFER)                                       [facility]

 

                CREATININE      2021 23:51:00 Zaria Medina  [facility]

 

                ALANINE AMINO   2021 23:51:00 Zaria Medina  [facility]



                TRANSFERASE(SGPT                                 [facility]

 

                LACTATE DEHYDROGENASE 2021 23:51:00 Zaria Medina  [facility]

 

                URIC ACID       2021 23:51:00 Carol [facility]

 

                CBC WITH DIFF   2021 23:51:00 Carol [facility]

 

                CENTRAL NEURAXIAL BLOCK 2021 05:10:15 Dianelys Crawford

[facility]

 

                URINALYSIS      2021 14:54:00 Macie Ortega  [facility]

 

                PROTEIN CREAT RATIO 2021 14:54:00 Macie Ortega  [facility]



                URINE RANDOM                                    [facility]

 

                SGOT (ASPARTATE AMINO 2021 14:53:00 Macie Ortega  [facility]



                TRANSFER)                                       [facility]

 

                CREATININE      2021 14:53:00 [facility]

 

                ALANINE AMINO   2021 14:53:00 [facility]



                TRANSFERASE(SGPT                                 [facility]

 

                LACTATE DEHYDROGENASE 2021 14:53:00 Macie Ortega  [facility]

 

                URIC ACID       2021 14:53:00 Macie Ortega  [facility]

 

                CBC WITH DIFF   2021 14:53:00 Macie Ortega  [facility]

 

                HEPATITIS B SURFACE 2021 14:53:00 Macie Ortega  [facility]



                ANTIGEN                                         [facility]

 

                HIV 1/2 AG-AB WITH 2021 14:53:00 Macie Ortega  [facility]



                REFLEX                                          [facility]

 

                GALV ONLY - SYPHILIS 2021 14:53:00 Macie Ortega  [facility]



                IGG/IGM                                         [facility]

 

                HB ABO GROUPING 2021 14:30:00 Macie Ortgea  [facility]

 

                RHO (D) IMMUNE GLOBULIN 2021 14:30:00 Cris Fitch [facility]

 

                COVID-19 (ID NOW RAPID 2021 13:08:00 Regi Traylor [facility]

[facility] [place]



                TESTING)                                        [facility]

 

                POCT URINALYSIS W/O 2021 21:24:00 Diane Moreno [facility]



                SPECIFIC GRAVITY                                 [facility]

 

                POCT URINALYSIS 2021 19:30:00 Donna Renner [facility]

 

                POCT URINALYSIS 2021 20:27:00 Donna Renner [facility]

 

                CONSENT/REFUSAL FOR 2021 19:16:07 Doctor Unassigned, No [facility]



                DIAGNOSIS AND TREATMENT                 Name            [facility] 

Branch

 

                POCT URINALYSIS 2021 19:50:00 Donna Renner [facility]

 

                POCT URINALYSIS 2021 16:54:00 Donna Renner [facility]

 

                POCT URINALYSIS 2021 00:00:00 Donna Renner [facility]

 

                POCT URINALYSIS 2020 00:00:00 Donna Renner [facility]

 

                POCT URINALYSIS 2020 00:00:00 Donna Renner [facility]

 

                GLUCOSE 1 HOUR POST 2020-10-05 15:48:00 Donna Renner

[facility]

 

                CBC WITH DIFF   2020-10-05 15:48:00 Donna Renner [facility]

 

                RUBELLA SCREEN IGG 2020-10-05 15:48:00 Donna Renner [facility]

 

                VZV ANTIBODY SCREEN 2020-10-05 15:48:00 Donna Renner

[facility]

 

                HEPATITIS B SURFACE 2020-10-05 15:48:00 Donna Renner

[facility]

 

                HIV 1/2 AG-AB WITH 2020-10-05 15:48:00 Donna Renner [facility]



                REFLEX                                          [facility]

 

                GALV ONLY - SYPHILIS 2020-10-05 15:48:00 Donna Renner [facility]

[facility] [place]



                IGG/IGM                                         [facility]

 

                SARS-COV-2 IGG  2020-10-05 15:48:00 Donna Renner [facility]

 

                HB ABO GROUPING 2020-10-05 15:40:00 Donna Renner [facility]

 

                URINE CULTURE   2020-10-05 15:29:00 Donna Renner [facility]

 

                GC & CHLAMYDIA AMPLIFIED 2020-10-05 15:29:00 Donna Renner 

[facility]

 

                LAB ONLY PAP    2020-10-05 15:29:00 Donna Renner [facility]



                SMEAR-LIQUID BASED                                 Medical Branc

h

 

                TRICHOMONAS AMPLIFIED 2020-10-05 15:29:00 Donna Renner Uni

[facility]

 

                PAP SMEAR-LIQUID 2020-10-05 15:29:00 Donna Renner [facility]                                        Medical Branch

 

                FLU VACC ([id number]), 6+ 2020-10-05 14:40:08 Donna Renner 

[facility]



                MONTHS, IM, QUAD                                 [facility]

 

                ASSIGNMENT OF BENEFITS 2020-10-05 13:44:38 Doctor Unassigned, No

 [facility]

 

                POCT PREGNANCY TEST 2020-10-05 00:00:00 [facility]

[facility]

 

                POCT URINALYSIS W/O 2020-10-05 00:00:00 Donna Renner

[facility]



                SPECIFIC GRAVITY                                 [facility]

 

                LIPASE          2020 20:52:00 Nolvia Carlton [facility]

 

                COMP. METABOLIC PANEL 2020 20:52:00 Nolvia Carlton [facility]

[facility]



                ([id number])                                         [facility]

 

                TOTAL BETA HCG ASSAY 2020 20:52:00 Nolvia Carlton

[facility]

 

                CBC WITH DIFF   2020 20:52:00 Nolvia Carlton [facility]

 

                URINALYSIS      2020 20:52:00 Nolvia Carlton [facility]

 

                POCT PREGNANCY TEST 2020 20:51:00 Nolvia Carlton [facility]

 

                NOTICE OF PRIVACY 2020 20:09:02 Doctor Unassigned, No Univ

[facility]



                PRACTICES                       Name            [facility]







Encounters







        Start   End     Encounter Admission Attending Care    Care    Encounter 

Source



        Date/Time Date/Time Type    Type    Clinicians Facility Department ID   

   

 

        2021-10-31         Outpatient                 [facility]    [id number]

 Univers



        16:33:05                                                         ity [facility]

 

        2021-10-31         Emergency                 [facility]    [id number] 

Univers



        03:14:37                                                         ity of



                                                                        [facility]

 

        2022-01-10 2022-01-10 Outpatient R               [facility]    [id number]

-20 Univers



        15:30:00 15:30:00                                         [id number]  ity [facility]

 

        2022-01-10 2022-01-10 Outpatient R               [facility]    [id number]

[id number] Univers



        15:30:00 15:30:00                                                 ity [facility]

 

        2021 Outpatient R               [facility]    [id number]

-20 Univers



        15:00:00 15:00:00                                         [id number]  ity [facility]

 

        2021 Outpatient R               [facility]    [id number]

[id number] Univers



        15:00:00 15:00:00                                                 ity [facility]

 

        2021-10-18 2021-10-18 Nurse           Visit, Aniket-Solange Nurse [facility]    [id number]

.[id number] 

Univers



        15:00:16 15:26:04 Visit           Donna Renner OB/GYN  [id number]

         ity of



                                                REGIONAL [id number]         Anjel

as



                                                MATERNAL [phone]         Med

ical



                                                & CHILD [address] [place] [address]                 

 

        2021-10-18 2021-10-18 Outpatient R               [facility]    [id number]

-20 Univers



        15:00:00 15:00:00                                         [id number]  ity [facility]

 

        2021-10-18 2021-10-18 Outpatient R               [facility]    [id number]

[id number] Univers



        15:00:00 15:00:00                                                 ity [facility]

 

        2021-10-18 2021-10-18 Orders          Doctor ROBBINS    [id number]

[id number] Univers



        00:00:00 00:00:00 Only            Unassigned, CAMACHO   350.1.13.10       

  ity of



                                        [facility] [id number]         Anjel

as



                                                        [phone]         Medi

lo



                                                        [address]             [facility]

 

        2021 Letter          AmadouROSENDO cabral    [id number]

60 Univers



        00:00:00 00:00:00 (Out)           Micheline SAUER   [id number]         it

y of



                                                [facility] [id number]         Anjel

as



                                                        [phone]         Medi

ol



                                                        [address]             [facility]

 

        2021 Laboratory         Only, Ang Db Test [facility]    [id number]

[id number] 

Univers



        16:56:39 17:06:39 Only            [facility]  [id number]      

   ity of



                                                [place] [id number]         Anjel

as



                                                Ted?Blea [phone]         [address]



                                                Medical                 



                                                Office                  



                                                [facility]                 

 

        2021 Outpatient R               [facility]    [id number]

-20 Univers



        16:50:00 16:50:00                                         [id number]  ity of



                                                                        [facility]

 

        2021 Outpatient R               [facility]    [id number]

[id number] Univers



        16:50:00 16:50:00                                                 ity [facility]

 

        2021 Case            Jud [facility]    [id number]

75 Univers



        00:00:00 00:00:00 Management         Donna ROJAS OB/GYN  350.1.13.10     

    ity of



                                                REGIONAL [id number]         Anjel

as



                                                MATERNAL [phone]         Med

ical



                                                & CHILD [address] [place] [address]                 

 

        2021 Telephone         Jud [facility]    [id number]

[id number] Univers



        00:00:00 00:00:00                 Donna R OB/GYN  350.1.13.10        

 ity of



                                                REGIONAL [id number]         Anjel

as



                                                MATERNAL [phone]         Med

ical



                                                & CHILD [address] [place] [address]                 

 

        2021 Patient         Doctor  [facility]    [id number]

73 Univers



        00:00:00 00:00:00 Secure Msg         Unassigned, OB/GYN  350.1.13.10    

     ity of



                                        No Name REGIONAL [id number]         Anjel

as



                                                MATERNAL [phone]         Med

ical



                                                & [address]                 

 

        2021 Nurse           Visit, IqraRmchp Nurse [facility]    1.2

.840.114 [id number] 

Univers



        15:41:55 16:16:26 Visit           Donna Renner R OB/GYN  350.1.13.10

         ity of



                                                REGIONAL [id number]         Anjel

as



                                                MATERNAL [phone]         Med

ical



                                                & [address]                 

 

        2021 Outpatient R               [facility]    [id number]

-20 Univers



        15:30:00 15:30:00                                         [id number]  ity [facility]

 

        2021 Outpatient R               [facility]    [id number]

464 Univers



        15:30:00 15:30:00                                                 ity of



                                                                        [facility]

 

        2021 Outpatient R               [facility]    [id number]

-20 Univers



        15:30:00 15:30:00                                         [id number]  ity [facility]

 

        2021 Outpatient R               [facility]    [id number]

466 Univers



        15:30:00 15:30:00                                                 ity [facility]

 

        2021 Office          Jud [facility]    [id number] [id number]

53 Univers



        15:01:43 16:06:53 Visit           Donna ROJAS OB/GYN  350.1.13.10        

 ity of



                                                REGIONAL [id number]         Anjel

as



                                                MATERNAL [phone]         Med

ical



                                                & CHILD [address] [place] [address]                 

 

        2021 Outpatient R       JUD [facility]    [id number]

-20 Univers



        15:00:00 15:00:00                 DONNA                 [id number]  ity o

f



                                                                        [facility]

 

        [facility]    [id number]

452 Univers



        15:00:00 15:00:00                 DONNA                         ity o

f



                                                                        [facility]

 

        2021 Routine         [place], [facility]    [id number]

[id number] Univers



        12:53:45 13:33:26 Prenatal         Diane HOGAN OB/GYN  350.1.13.10       

  ity of



                        Visit                   REGIONAL [id number]         Anjel

as



                                                MATERNAL [phone]         Med

ical



                                                & [address]                 

 

        2021 Outpatient R       [place], [facility]    [id number]

4A-20 Univers



        12:45:00 12:45:00                 DIANE                 [id number]  ity o

[facility]

 

        2021 Outpatient R       [place], [facility]    [id number]

[id number] Univers



        12:45:00 12:45:00                 DIANE                         ity o

[facility]

 

        2021-05-10 2021-05-10 Outpatient R               [facility]    [id number]

-20 Univers



        13:30:00 13:30:00                                         [id number]  ity of



                                                                        [facility]

 

        2021-05-10 2021-05-10 Outpatient R               [facility]    [id number]

815 Univers



        13:30:00 13:30:00                                                 ity [facility]

 

        2021-05-10 2021-05-10 Nurse           Visit, [facility]    1.2

.840.114 [id number] 

Univers



        13:23:49 13:23:56 Visit           Donna Renner OB/GYN  350.1.13.10

         ity of



                                                REGIONAL 4.2.7.2.686         Anjel

as



                                                MATERNAL [phone]         Med

ical



                                                & CHILD [address] [place] [address]                 

 

        2021 Anesthesia         Dianelys Crawford    1.2.

840.114 [id number] 

Univers



        23:31:00 22:20:00 Event           Satish Coronel   350.1.13.10 

        ity of



                                                [place]   4.2.7.2.686         [place]

[place]



                                                        [phone]         [address]

 

        2021 Surgery         ROSENDO Sneed    1.2.840.114 [id number]

839 Univers



        20:15:00 22:12:00                 Rudy SAUER   350.1.13.10         i

ty of



                                                ANNEX   4.2.7.2.686         [place]

[place]



                                                        [phone]         Medi

lo



                                                        [address]             [place]

 

        2021 Routine         [facility]    1.2.948.074 [id number]

9420 Univers



        16:13:07 16:44:21 Prenatal         Diane HOGAN OB/GYN  350.1.13.10       

  ity of



                        Visit                   REGIONAL 4.2.7.2.686         Anjel

as



                                                MATERNAL [phone]         Med

ical



                                                & [address]                 

 

        2021 Outpatient R       [facility]    [id number]

4A-20 Univers



        16:00:00 16:00:00                 DIANE                 [id number]  ity o

[facility]

 

        2021 Outpatient R       [facility]    [id number]

95389 Univers



        16:00:00 16:00:00                 [facility]

 

        2021 Telephone         [facility]    1.2.172.865 [id number]

8705 Univers



        00:00:00 00:00:00                 Donna ROJAS OB/GYN  350.1.13.10        

 ity of



                                                REGIONAL 4.2.7.2.686         Anjel

as



                                                MATERNAL [phone]         Med

ical



                                                & [address]                 

 

        2021 [facility]       [facility]    [id number]

-20 Univers



        14:45:00 14:45:00                 DONNA                 [id number]  ity o

[facility]

 

        2021 [facility] R       [facility]    [id number]

227 Univers



        14:45:00 14:45:00                 PAULA                         yolandey o

[facility]

 

        2021 Nurse           ROSENDO Morris    1.2.840.114 [id number]

26 Univers



        00:00:00 00:00:00 Triage          Lee Ann EMA CAMACHO   350.1.13.10         i

ty of



                                                HOSPITAL 4.2.7.2.686         Anjel

as



                                                        [phone]         [address]

 

        2021 Outpatient R       JUD [facility]    [id number]

-20 Univers



        12:45:00 12:45:00                 ROSHUNDA                 [id number]  [place]

f



                                                                        [facility]

 

        2021 Outpatient R       JUD [facility]    [id number]

225 Univers



        12:45:00 12:45:00                 ROSNDA                         [place]

[facility]

 

        2021 Outpatient R       JUD [facility]    [id number]

-20 Univers



        09:45:00 09:45:00                 ROSNDA                 [id number]  ity o

[facility]

 

        2021 Outpatient R       JUD [facility]    [id number]

764 Univers



        09:45:00 09:45:00                 ROSHUNDA                         [place] o

[facility]

 

        2021 Outpatient R       JUD [facility]    [id number]

-20 Univers



        15:00:00 15:00:00                 NIDANDA                 [id number]  [place]

[facility]

 

        2021 Outpatient R       JUD [facility]    [id number]

233 Univers



        15:00:00 15:00:00                 [facility]                         [place] [facility]

 

        2021 Routine         [facility]    1.2.840.114 [id number]

59 Univers



        14:23:28 14:54:43 Prenatal         Roshunda R OB/GYN  350.1.13.10       

  ity of



                        Visit                   REGIONAL 4.2.7.2.686         Anjel

as



                                                MATERNAL [phone]         Med

ical



                                                & CHILD [address] [place] [address]                 

 

        2021 Routine         [facility]    1.2.840.114 [id number]

45 Univers



        15:11:01 15:43:49 Prenatal         Roshunda R OB/GYN  350.1.13.10       

  ity of



                        Visit                   REGIONAL 4.2.7.2.686         Anjel

as



                                                MATERNAL [phone]         Med

ical



                                                & CHILD [address] [place] [address]                 

 

        2021 Outpatient R       JUD [facility]    [id number]

-20 Univers



        15:00:00 15:00:00                 CHINONITZA                 [id number]  ity o

[facility]

 

        2021 Outpatient R       JUD [facility]    [id number]

331 Univers



        15:00:00 15:00:00                 DONNA warren o

[facility]

 

        2021 Outpatient BOB RENNER [facility]    [id number]

257 Univers



        15:00:00 15:00:00                 NIDANDEMA                         briana o

[facility]

 

        2021 Outpatient R       JUD [facility]    [id number]

-20 Univers



        15:00:00 15:00:00                 CHINONITZA                 [id number]  [place]

[facility]

 

        2021-03-10 2021-03-10 Routine         [facility]    1.2.840.114 [id number]

58 Univers



        14:46:07 15:31:37 Prenatal         Donna ROJAS OB/GYN  350.1.13.10       

  ity of



                        Visit                   REGIONAL 4.2.7.2.686         Anjel

as



                                                MATERNAL [phone]         Med

ical



                                                & CHILD [address] [place] [address]                 

 

        2021-03-10 2021-03-10 Outpatient BOB RENNER [facility]    [id number]

-20 Univers



        15:00:00 15:00:00                 CHINONITZA                 [id number]  [place]

[facility]

 

        2021-03-10 2021-03-10 [facility]    [id number]

032 Univers



        15:00:00 15:00:00                 NIDANITZA                         [facility] o

[facility]

 

        2021 Emergency         [facility]    1.2.380.427 [id number]

6076 Univers



        13:24:00 14:53:00                 Dawit Crespo 350.1.13.10         i

ty of



                                                [place] 4.2.7.2.686         [facility]  [phone]         [address]

 

        2021 Orders          Doctor  ROSENDO    1.2.840.114 [id number]

70 Univers



        00:00:00 00:00:00 Only            Unassigned, CAMACHO   350.1.13.10       

  ity of



                                        [facility] 4.2.7.2.686         Anjel

as



                                                        [phone]         [address]

 

        2021 Routine         [facility]    1.2.840.114 [id number]

73 Univers



        13:30:56 13:45:56 Prenatal         Roshunda R OB/GYN  350.1.13.10       

  ity of



                        Visit                   REGIONAL 4.2.7.2.686         Anjel

as



                                                MATERNAL [phone]         Med

ical



                                                & CHILD [address] [place] [address]                 

 

        2021 Outpatient R       JUD [facility]    [id number]

-20 Univers



        13:30:00 13:30:00                 ROSHUNDA                 [id number]  ity o

[facility]

 

        2021 Outpatient BOB RENNER [facility]    [id number]

016 Univers



        13:30:00 13:30:00                 ROSHUNDA                         ity o

[facility]

 

        2021 Outpatient BOB RENNER [facility]    [id number]

-20 Univers



        12:45:00 12:45:00                 ROSHUNDA                 [id number]  ity o

[facility]

 

        2021 Outpatient BOB RENNER [facility]    [id number]

814 Univers



        12:45:00 12:45:00                 ROSHUNDA                         ity o

[facility]

 

        2021 Outpatient R       JUD [facility]    [id number]

-20 Univers



        08:30:00 08:30:00                 ROSHUNDA                 [id number]  ity o

[facility]

 

        2021 [facility] R       [facility]    [id number]

062 Univers



        08:30:00 08:30:00                 ROSHUNDA                         [facility] o

[facility]

 

        2021 Routine         [facility]    1.2.840.114 [id number]

06 Univers



        10:39:26 11:18:04 Prenatal         Roshunda R OB/GYN  350.1.13.10       

  ity of



                        Visit                   REGIONAL 4.2.7.2.686         Anjel

as



                                                MATERNAL [phone]         Med

ical



                                                & CHILD [address] [place] [address]                 

 

        2021 Outpatient R       JUD [facility]    [id number]

-20 Univers



        11:00:00 11:00:00                 ROSJEFFRYNDA                 [id number]  ity o

f



                                                                        [facility]

 

        2021 Outpatient R       JUD [facility]    [id number]

897 Univers



        11:00:00 11:00:00                 ROSHUNDA                         ity o

f



                                                                        [facility]

 

        2021 Abstract         Jud [facility]    1.2.840.114 [id number]

489 Univers



        00:00:00 00:00:00                 Rosjudia R OB/GYN  350.1.13.10        

 ity of



                                                REGIONAL 4.2.7.2.686         Anjel

as



                                                MATERNAL [phone]         Med

ical



                                                & CHILD [address] [place] [address]                 

 

        2021 Technician         Ultrasound, [facility]    1.2

.840.114 [id number] 

Univers



        13:49:18 15:04:18 Visit           Alex Wang OB/GYN  350.1.13.10   

      ity of



                                                REGIONAL 4.2.7.2.686         Anjel

as



                                                MATERNAL [phone]         Med

ical



                                                & CHILD 369             [facility]                 

 

        2021 Outpatient P               [facility]    [id number]

-20 Univers



        14:00:00 14:00:00                                         [id number]  ity [facility]

 

        2021 Outpatient P               [facility]    [id number]

025 Univers



        14:00:00 14:00:00                                                 ity of



                                                                        [facility]

 

        2021 Routine         [facility]    1.2.840.114 [id number]

87 Univers



        08:30:53 09:07:35 Prenatal         Rosyvon R OB/GYN  350.1.13.10       

  ity of



                        Visit                   REGIONAL 4.2.7.2.686         Anjel

as



                                                MATERNAL [phone]         Med

ical



                                                & CHILD [address] [place] [address]                 

 

        2021 [facility]patient R       [facility]    [id number]

-20 Univers



        08:15:00 08:15:00                 ROSJEFFRYNDA                 [id number]  ity o

f



                                                                        [facility]

 

        2021 Outpatient R       RENNER [facility]    [id number]

307 Univers



        08:15:00 08:15:00                 CHINONDA                         ity o

f



                                                                        [facility]

 

        2020 Refill          Doctor  [facility]    1.2.840.114 [id number]

47 Univers



        00:00:00 00:00:00                 Unassigned, OB/GYN  350.1.13.10       

  ity of



                                        No Name REGIONAL 4.2.7.2.686         Anjel

as



                                                MATERNAL [phone]         Med

ical



                                                & CHILD [address] [place] [address]                 

 

        2020-12-15 2020-12-15 Telephone         [facility]    1.2.720.662 [id number]

3578 Univers



        00:00:00 00:00:00                 Donna R OB/GYN  350.1.13.10        

 ity of



                                                REGIONAL 4.2.7.2.686         Anjel

as



                                                MATERNAL [phone]         Med

ical



                                                & CHILD [address] [place] [address]                 

 

        2020 Outpatient R               [facility]    [id number]

-20 Univers



        09:30:00 09:30:00                                         2012  ity of



                                                                        [facility]

 

        2020 Outpatient P               [facility]    [id number]

933 Univers



        09:30:00 09:30:00                                                 ity of



                                                                        [facility]

 

        2020-12-10 2020-12-10 [place]         [facility]    1.2.357.966 [id number]

8492 Univers



        00:00:00 00:00:00                 Donna R OB/GYN  350.1.13.10        

 ity of



                                                REGIONAL 4.2.7.2.686         Anjel

as



                                                MATERNAL [phone]         Med

ical



                                                & CHILD [address] [place] [address]                 

 

        2020 [facility] R       [facility]    [id number]

-20 Univers



        10:15:00 10:15:00                 DONNA                   ity o

f



                                                                        [facility]

 

        2020 [facility]patient R       [facility]    [id number]

783 Univers



        10:15:00 10:15:00                 CHINONDEMA                         ity o

f



                                                                        [facility]

 

        2020 Routine         [facility]    1.2.840.114 [id number]

51 Univers



        09:21:49 09:36:49 Prenatal         Roshunda R OB/GYN  350.1.13.10       

  ity of



                        Visit                   REGIONAL 4.2.7.2.686         Anjel

as



                                                MATERNAL [phone]         Med

ical



                                                & CHILD [address] [place] [address]                 

 

        2020-11-10 2020-11-10 Refill          Doctor  [facility]    1.2.840.114 [id number]

07 Univers



        00:00:00 00:00:00                 Unassigned, OB/GYN  350.1.13.10       

  ity of



                                        No Name REGIONAL 4.2.7.2.686         Anjel

as



                                                MATERNAL [phone]         Med

ical



                                                & CHILD [address] [place] [address]                 

 

        2020 Routine         Jud [facility]    1.2.840.114 [id number]

70 Univers



        13:31:08 14:07:59 Prenatal         Roshunda R OB/GYN  350.1.13.10       

  ity of



                        Visit                   REGIONAL 4.2.7.2.686         Anjel

as



                                                MATERNAL [phone]         Med

ical



                                                & CHILD [address] [place] [address]                 

 

        2020 Technician         Ultrasound, Ang-Dago [facility]    1.2

.840.114 [id number] 

Univers



        12:49:51 14:04:51 Visit           Alex Wang OB/GYN  350.1.13.10   

      ity of



                                                REGIONAL 4.2.7.2.686         Anjel

as



                                                MATERNAL [phone]         Med

ical



                                                & CHILD 369             [facility]                 

 

        2020 Outpatient R               [facility]    [id number]

-20 Univers



        13:00:00 13:00:00                                           ity of



                                                                        [facility]

 

        2020 Outpatient P               [facility]    [id number]

096 Univers



        13:00:00 13:00:00                                                 ity of



                                                                        [facility]

 

        2020 Abstract         Jud [facility]    1.2.840.114 [id number]

699 Univers



        00:00:00 00:00:00                 Roshunda R OB/GYN  350.1.13.10        

 ity of



                                                REGIONAL 4.2.7.2.686         Anjel

as



                                                MATERNAL [phone]         Med

ical



                                                & CHILD [address] [place] [address]                 

 

        2020 Outpatient BOB RENNER [facility]    [id number]

-20 Univers



        09:30:00 09:30:00                 ROSHUNDA                   ity o

f



                                                                        [facility]

 

        2020 Outpatient BOB RENNER [facility]    [id number]

427 Univers



        09:30:00 09:30:00                 ROSHUNDA                         ity o

f



                                                                        [facility]

 

        2020-10-12 2020-10-12 [place]         [facility]    1.2.988.527 [id number]

8541 Univers



        00:00:00 00:00:00                 Roshunda R OB/GYN  350.1.13.10        

 ity of



                                                REGIONAL 4.2.7.2.686         Anjel

as



                                                MATERNAL [id number]         Med

ical



                                                & [address]                 

 

        2020-10-08 2020-10-08 [place]         [facility]    1.2.787.665 [id number]

4873 Univers



        00:00:00 00:00:00                 Roshunda R OB/GYN  350.1.13.10        

 ity of



                                                REGIONAL 4.2.7.2.686         Anjel

as



                                                MATERNAL [id number]         Med

ical



                                                & [address]                 

 

        2020-10-07 2020-10-07 [place]         [facility]    1.2.618.047 [id number]

5053 Univers



        00:00:00 00:00:00                 Roshunda R OB/GYN  350.1.13.10        

 ity of



                                                REGIONAL 4.2.7.2.686         Anjel

as



                                                MATERNAL [id number]         Med

ical



                                                & [address]                 

 

        2020-10-07 2020-10-07 [place]         [facility]    1.2.318.226 [id number]

1211 Univers



        00:00:00 00:00:00                 Roshunda R OB/GYN  350.1.13.10        

 ity of



                                                REGIONAL 4.2.7.2.686         Anjel

as



                                                MATERNAL [id number]         Med

ical



                                                & CHILD [address] [place] [address]                 

 

        2020-10-05 2020-10-05 [facility]         [facility]    1.2.840.114 [id number]

76 Univers



        08:50:17 10:43:51 Prenatal         Roshunda R OB/GYN  350.1.13.10       

  ity of



                        Visit                   REGIONAL 4.2.7.2.686         Anjel

as



                                                MATERNAL [phone]         Med

ical



                                                & CHILD 107             [facility]                 

 

        2020-10-05 2020-10-05 Outpatient R       RENNER [facility]    [id number]

144 Univers



        09:00:00 09:00:00                 DONNA                         ity o

f



                                                                        [facility]

 

        2020-10-05 2020-10-05 Orders          Doctor  ROSENDO    1.2.840.114 [id number]

35 Univers



        00:00:00 00:00:00 Only            Unassigned, CAMACHO   350.1.13.10       

  ity of



                                        [facility] 4.2.7.2.686         Anjel

as



                                                        [phone]         Medi

lo



                                                        [address]             [place]

 

        2020 Emergency         Bianka [facility]    1.2.840.114 [id number]

[id number] Univers



        15:23:00 18:55:00                 Nolvia BROWN [place] 350.1.13.10        

 ity of



                                                [place] 4.2.7.2.686         [place]

[facility]  [phone]         Medi

lo



                                                        [address]             [place]

 

        2020 Emergency X               [facility]    ERT     [id number]

75 Univers



        15:10:00 15:10:00                                                 ity of



                                                                        [facility]

 

        2019 Emergency                 [facility]     MED     [id number]

0 Ming



        20:42:00 20:42:00                                                 Health

 

        2019 Emergency                 [facility] of [place]     MED     [id number]

5 Ming



        14:50:00 14:50:00                                                 Health

 

        2018 Emergency                 [facility]     MED     [id number]

7 Ming



        13:39:00 13:39:00                                                 Health

 

        2017 Emergency E               [facility]    MED     [id number]

[address]



        12:25:00 12:25:00                                                 [facility]

 

        2013 Outpatient                 [facility]     [id number]

3 Ming



        07:35:53 07:35:53                                                 Health







Results







           Test Description Test Time  Test Comments Results    Result Comments 

Source









                    RHO (D) IMMUNE GLOBULIN 2021 14:09:43 









                      Test Item  Value      Reference Range Interpretation Comme

nts









             RHIG CANDIDATE? (test code = No- see comment                       

    Patient is not a candidate for 

RhIg-



             5055)                                               Patient is Rh P

ositive.Performed at



                                                                 [facility]

 Services - [facility] Blood



                                                                 [id number] [place] [id number]

 Free:



                                                                 [phone]

A No. [id number]



[facility] WITH [id number] 12:22:00





             Test Item    Value        Reference Range Interpretation Comments

 

             WBC (test code =              See_Comment  H             [Automated



             6690-2)                                             message] The



                                                                 system which



                                                                 generated this



                                                                 result



                                                                 transmitted



                                                                 reference range

:



                                                                 4.30 - 11.10



                                                                 10*3/?L. The



                                                                 reference range



                                                                 was not used to



                                                                 interpret this



                                                                 result as



                                                                 normal/abnormal

.

 

             RBC (test code =              See_Comment  L             [Automated



             789-8)                                              message] The



                                                                 system which



                                                                 generated this



                                                                 result



                                                                 transmitted



                                                                 reference range

:



                                                                 3.93 - 5.25



                                                                 10*6/?L. The



                                                                 reference range



                                                                 was not used to



                                                                 interpret this



                                                                 result as



                                                                 normal/abnormal

.

 

             HGB (test code = 9.5 g/dL     11.6-15.0    L            



             718-7)                                              

 

             HCT (test code = 27.3 %       35.7-45.2    L            



             4544-3)                                             

 

             MCV (test code = 91.3 fL      80.6-95.5                 



             787-2)                                              

 

             MCH (test code = 31.8 pg      25.9-32.8                 



             785-6)                                              

 

             MCHC (test code = 34.8 g/dL    31.6-35.1                 



             786-4)                                              

 

             RDW-SD (test code = 41.1 fL      39.0-49.9                 



             95038-8)                                            

 

             RDW-CV (test code = 12.5 %       12.0-15.5                 



             788-0)                                              

 

             PLT (test code =              See_Comment  L             [Automated



             777-3)                                              message] The



                                                                 system which



                                                                 generated this



                                                                 result



                                                                 transmitted



                                                                 reference range

:



                                                                 166 - 358



                                                                 10*3/?L. The



                                                                 reference range



                                                                 was not used to



                                                                 interpret this



                                                                 result as



                                                                 normal/abnormal

.

 

             MPV (test code = 11.2 fL      9.5-12.9                  



             97536-5)                                            

 

             NRBC/100 WBC (test              See_Comment                [Automat

ed



             code = 3053251555)                                        message] 

The



                                                                 system which



                                                                 generated this



                                                                 result



                                                                 transmitted



                                                                 reference range

:



                                                                 0.0 - 10.0 /100



                                                                 WBCs. The



                                                                 reference range



                                                                 was not used to



                                                                 interpret this



                                                                 result as



                                                                 normal/abnormal

.

 

             NRBC x10^3 (test code <0.01        See_Comment                [Auto

mated



             = 9238757488)                                        message] The



                                                                 system which



                                                                 generated this



                                                                 result



                                                                 transmitted



                                                                 reference range

:



                                                                 10*3/?L. The



                                                                 reference range



                                                                 was not used to



                                                                 interpret this



                                                                 result as



                                                                 normal/abnormal

.

 

             GRAN MAT (NEUT) % 88.6 %                                 



             (test code = 770-8)                                        

 

             IMM GRAN % (test code 0.50 %                                 



             = 6719187265)                                        

 

             LYMPH % (test code = 6.4 %                                  



             736-9)                                              

 

             MONO % (test code = 4.4 %                                  



             5905-5)                                             

 

             EOS % (test code = 0.0 %                                  



             713-8)                                              

 

             BASO % (test code = 0.1 %                                  



             706-2)                                              

 

             GRAN MAT x10^3(ANC) 18.66 10*3/uL 1.88-7.09    H            



             (test code =                                        



             8061523191)                                         

 

             IMM GRAN x10^3 (test 0.11 10*3/uL 0.00-0.06    H            



             code = 4418369567)                                        

 

             LYMPH x10^3 (test 1.34 10*3/uL 1.32-3.29                 



             code = 731-0)                                        

 

             MONO x10^3 (test code 0.93 10*3/uL 0.33-0.92    H            



             = 742-7)                                            

 

             EOS x10^3 (test code <0.03        0.03-0.39    L            



             = 711-2)                                            

 

             BASO x10^3 (test code 0.03 10*3/uL 0.01-0.07                 



             = 704-7)                                            

 

             BANDS (test code = MARKED INCREASED              A            



             3681305988)                                         

 

             Lab Interpretation Abnormal                               



             (test code = 02159-7)                                        



[facility] Ratio Urine Iuwnyi9361-92-68 
02:14:23





             Test Item    Value        Reference Range Interpretation Comments

 

             T. PROT U (test >1000        mg/dL                     Urine protei

n is 1289



             code = 2888-6)                                        mg/dL with ex

tended



                                                                 dilution.

 

             CREAT U (test code 223.7 mg/dL                            



             = 6597186059)                                        

 

             Protein/Creatinine                                        Unable to

 calculate



             Ratio Urine (test                                        because, e

ither urine



             code = 8507588635)                                        total pro

tein, urine



                                                                 creatinine, or 

both are



                                                                 greater than 

e



                                                                 linearity of 

e



                                                                 analyzer.



[facility] Lnbxmqwucfalx0887-62-69 00:26:39





             Test Item    Value        Reference Range Interpretation Comments

 

             LDH (test code = 4151665328) 546 U/L      300-600                  

 

 

             Lab Interpretation (test code = Normal                             

    



             85271-2)                                            



[facility] Grmja1849-81-28 00:26:23





             Test Item    Value        Reference Range Interpretation Comments

 

             URIC ACID (test code = 1003139168) 7.0 mg/dL    2.9-6.0      H     

       

 

             Lab Interpretation (test code = Abnormal                           

    



             40379-6)                                            



[facility] Hawikwwejd0325-20-11 00:26:23





             Test Item    Value        Reference Range Interpretation Comments

 

             CREATININE (test code 1.03 mg/dL   0.50-1.04                 



             = 2986850507)                                        

 

             eGFR (test code =              mL/min/1.73m2              



             7487671204)                                         

 

             YESSENIA (test code = YESSENIA) Association of                           



                          Glomerular Filtration                           



                          Rate (GFR) and Staging                           



                          of Kidney Disease*                           



                          +-----------------------                           



                          +---------------------+-                           



                          ------------------------                           



                          +| GFR (mL/min/1.73 m2)                           



                          ?| With Kidney Damage ?|                           



                          ?Without Kidney                           



                          Damage+-----------------                           



                          ------+-----------------                           



                          ----+-------------------                           



                          ------+| ?>90 ? ? ? ? ?                           



                          ? ? ? ?| ?Stage one ? ?                           



                          ? ? ?| ? Normal ? ? ? ?                           



                          ? ? ?                                  



                          ?+----------------------                           



                          -+---------------------+                           



                          ------------------------                           



                          -+| ?60-89 ? ? ? ? ? ? ?                           



                          ?| ?Stage two ? ? ? ? ?|                           



                          ? Decreased GFR ? ? ? ?                           



                          +-----------------------                           



                          +---------------------+-                           



                          ------------------------                           



                          +| ?30-59 ? ? ? ? ? ? ?                           



                          ?| ?Stage three ? ? ? ?|                           



                          ? Stage three ? ? ? ? ?                           



                          +-----------------------                           



                          +---------------------+-                           



                          ------------------------                           



                          +| ?15-29 ? ? ? ? ? ? ?                           



                          ?| ?Stage four ? ? ? ? |                           



                          ? Stage four ? ? ? ? ?                           



                          ?+----------------------                           



                          -+---------------------+                           



                          ------------------------                           



                          -+| ?<15 (or dialysis) ?                           



                          ?| ?Stage five ? ? ? ? |                           



                          ? Stage five ? ? ? ? ?                           



                          ?+----------------------                           



                          -+---------------------+                           



                          ------------------------                           



                          -+ *Each stage assumes                           



                          the associated GFR level                           



                          has been in effect for                           



                          at least three months.                           



                          ?Stages 1 to 5, with or                           



                          without kidney disease,                           



                          indicate chronic kidney                           



                          disease. Notes:                           



                          Determination of stages                           



                          one and two (with eGFR                           



                          >59mL/min/1.73 m2)                           



                          requires estimation of                           



                          kidney damage for at                           



                          least three months as                           



                          defined by structural or                           



                          functional abnormalities                           



                          of the kidney,                           



                          manifested by                           



                          either:Pathological                           



                          abnormalities or Markers                           



                          of kidney damage                           



                          (including abnormalities                           



                          in the composition of                           



                          the blood or urine or                           



                          abnormalities in imaging                           



                          tests).                                



[facility] 
00:26:23





             Test Item    Value        Reference Range Interpretation Comments

 

             AST(SGOT) (test code = 7466923849) 24 U/L       13-40              

       

 

             Lab Interpretation (test code = Normal                             

    



             92076-8)                                            



[facility] (SGPT)2021 
00:26:23





             Test Item    Value        Reference Range Interpretation Comments

 

             ALTv (test code = 1742-6) 19 U/L       5-35                      

 

             Lab Interpretation (test code = Normal                             

    



             73057-8)                                            



[facility] 00:22:28





             Test Item    Value        Reference Range Interpretation Comments

 

             APPEARANCE (test code = Cloudy       Clear        A            



             8875521403)                                         

 

             COLOR (test code = Ashley        Yellow       A            



             8413258942)                                         

 

             PH (test code =              4.8-8.0                   



             7856101650)                                         

 

             SP GRAVITY (test code =              1.003-1.030               



             3814600490)                                         

 

             GLU U QUAL (test code = Normal       Normal                    



             7620535027)                                         

 

             BLOOD (test code = 3+           Negative     A            



             1707072721)                                         

 

             KETONES (test code = Negative     Negative                  



             2394741470)                                         

 

             PROTEIN (test code = 100 mg/dL    Negative     A            



             2887-8)                                             

 

             UROBILIN (test code = Normal       Normal                    



             1582441528)                                         

 

             BILIRUBIN (test code = Negative     Negative                  



             5857348422)                                         

 

             NITRITE (test code = Negative     Negative                  



             1817961398)                                         

 

             LEUK OZZY (test code = 25/uL        Negative     A            



             1288828916)                                         

 

             RBC/HPF (test code = >182         See_Comment  H             [Autom

ated message]



             3037257638)                                         The system [employer]



                                                                 generated this



                                                                 result transmit

dieudonne



                                                                 reference range

: 0 -



                                                                 3 HPF. The refe

rence



                                                                 range was not u

sed



                                                                 to interpret th

is



                                                                 result as



                                                                 normal/abnormal

.

 

             WBC/HPF (test code =              See_Comment  H             [Autom

ated message]



             0899272680)                                         The system [employer]



                                                                 generated this



                                                                 result transmit

dieudonne



                                                                 reference range

: 0 -



                                                                 5 HPF. The refe

rence



                                                                 range was not u

sed



                                                                 to interpret th

is



                                                                 result as



                                                                 normal/abnormal

.

 

             BACTERIA (test code = Negative     Negative                  



             6977377521)                                         

 

             MUCOUS (test code = Marked       Negative LPF A            



             8281680307)                                         

 

             Lab Interpretation (test Abnormal                               



             code = 71723-5)                                        



[facility]ano[facility] with Ffzsktghzfxf2660-38-23 00:18:18





             Test Item    Value        Reference Range Interpretation Comments

 

             WBC (test code =              See_Comment  H             [Automated



             6690-2)                                             message] The



                                                                 system which



                                                                 generated this



                                                                 result transmit

dieudonne



                                                                 reference range

:



                                                                 4.30 - 11.10



                                                                 10*3/?L. The



                                                                 reference range



                                                                 was not used to



                                                                 interpret this



                                                                 result as



                                                                 normal/abnormal

.

 

             RBC (test code =              See_Comment  L             [Automated



             789-8)                                              message] The



                                                                 system which



                                                                 generated this



                                                                 result transmit

dieudonne



                                                                 reference range

:



                                                                 3.93 - 5.25



                                                                 10*6/?L. The



                                                                 reference range



                                                                 was not used to



                                                                 interpret this



                                                                 result as



                                                                 normal/abnormal

.

 

             HGB (test code = 10.9 g/dL    11.6-15.0    L            



             718-7)                                              

 

             HCT (test code = 31.5 %       35.7-45.2    L            



             4544-3)                                             

 

             MCV (test code = 90.3 fL      80.6-95.5                 



             787-2)                                              

 

             MCH (test code = 31.2 pg      25.9-32.8                 



             785-6)                                              

 

             MCHC (test code = 34.6 g/dL    31.6-35.1                 



             786-4)                                              

 

             RDW-SD (test code = 40.3 fL      39.0-49.9                 



             94836-6)                                            

 

             RDW-CV (test code = 12.5 %       12.0-15.5                 



             788-0)                                              

 

             PLT (test code =              See_Comment  L             [Automated



             777-3)                                              message] The



                                                                 system which



                                                                 generated this



                                                                 result transmit

dieudonne



                                                                 reference range

:



                                                                 166 - 358 10*3/

?L.



                                                                 The reference



                                                                 range was not u

sed



                                                                 to interpret th

is



                                                                 result as



                                                                 normal/abnormal

.

 

             MPV (test code = 11.0 fL      9.5-12.9                  



             32505-5)                                            

 

             NRBC/100 WBC (test              See_Comment                [Automat

ed



             code = 8729844445)                                        message] 

The



                                                                 system which



                                                                 generated this



                                                                 result transmit

dieudonne



                                                                 reference range

:



                                                                 0.0 - 10.0 /100



                                                                 WBCs. The



                                                                 reference range



                                                                 was not used to



                                                                 interpret this



                                                                 result as



                                                                 normal/abnormal

.

 

             NRBC x10^3 (test code <0.01        See_Comment                [Auto

mated



             = [id number])                                        message] The



                                                                 system which



                                                                 generated this



                                                                 result transmit

dieudonne



                                                                 reference range

:



                                                                 10*3/?L. The



                                                                 reference range



                                                                 was not used to



                                                                 interpret this



                                                                 result as



                                                                 normal/abnormal

.

 

             GRAN MAT (NEUT) % 88.6 %                                 



             (test code = 770-8)                                        

 

             IMM GRAN % (test code 0.40 %                                 



             = 9847168336)                                        

 

             LYMPH % (test code = 6.1 %                                  



             736-9)                                              

 

             MONO % (test code = 4.6 %                                  



             5905-5)                                             

 

             EOS % (test code = 0.1 %                                  



             713-8)                                              

 

             BASO % (test code = 0.2 %                                  



             706-2)                                              

 

             GRAN MAT x10^3(ANC) 11.73 10*3/uL 1.88-7.09    H            



             (test code =                                        



             6450659276)                                         

 

             IMM GRAN x10^3 (test 0.05 10*3/uL 0.00-0.06                 



             code = 1250186881)                                        

 

             LYMPH x10^3 (test code 0.81 10*3/uL 1.32-3.29    L            



             = 731-0)                                            

 

             MONO x10^3 (test code 0.61 10*3/uL 0.33-0.92                 



             = 742-7)                                            

 

             EOS x10^3 (test code = <0.03        0.03-0.39    L            



             711-2)                                              

 

             BASO x10^3 (test code <0.03        0.01-0.07                 



             = 704-7)                                            

 

             Lab Interpretation Abnormal                               



             (test code = 35734-6)                                        



[facility]GG/[id number] 
18:40:09





             Test Item    Value        Reference Range Interpretation Comments

 

             Syphilis IgG/IgM (test Non-reactive Non-reactive              



             code = 32006-0)                                        

 

             YESSENIA (test code = YESSENIA)  Non-reactive - No                           



                          serologic evidence                           



                          of T. pallidum                           



                          infection. Cannot                           



                          exclude incubating                           



                          or early syphilis.                           



                          Submit a second                           



                          specimen in 2-4                           



                          weeks if syphilis is                           



                          clinically                             



                          suspected. Equivocal                           



                          - Further testing to                           



                          follow. Reactive -                           



                          Further testing to                           



                          follow.                                

 

             Lab Interpretation (test Normal                                 



             code = 12683-3)                                        



[facility] [id number] 05:10:15
Dianelys Crawford MD ? ? 5/3/2021 12:11 AM Central Neuraxial Block   Performed
by: Dianelys Crawford MDAuthorized by: Funmilayo Lopez MD  Reason for Block: 
?OB request, Patient request and Labor analgesiaStaff:  ?Resident/CRNA: ?Tung Giron MD patient identified, IV checked, risks and benefits explained, 
monitors and equipment checked, timeout performed, ob surgical consent/approval,
pre-op evaluation, surgical consent, site marked and anesthesia consentEpidural:
 ?Patient Position: ?Sitting ?Prep: Betadine ? ?Monitoring: ?Heart rate, 
continuous pulse ox, fetal heart rate / toco and NIBP ?Location: ?Lumbar (1-5) 
?Lumbar: ?L3-L4 ?Approach: ?MidlineNeedle and Epidural Catheter:  ?Epidural Kit:
?BBraun ?Needle Type: ?Tuohy ?Needle Length: ?3.5 in (8.89 cm) ?Needle Insertion
Depth: ?7.5   Assessment:  ?Sensory Level: ?Below U86Iwals:  ? Patient 
identified; pre-procedure verification.Patient prepped and draped in standard 
sterile fashion using betadine x 3Subcutaneous infiltration with 1% Lidocaine 
ANDRES at 7.5 cm; catheter secured at 12.5 cm with mastisol, tegaderm x2 and 3-inch
clear tape.Aspiration test negative x 3Test dose negativePatient tolerated 
procedure well with no immediate complications Epidural expectations; PCEA 
explained and fall precautions given.[facility] 
AG-AB WITH [id number] 16:41:51





             Test Item    Value        Reference Range Interpretation Comments

 

             HIV          Negative     Negative                  



             Semi-quantitative                                        



             (test code =                                        



             22616-7)                                            

 

             YESSENIA (test code = Non-reactive for HIV-1                           



             YESSENIA)         antigen and HIV-1/HIV-2                           



                          antibodies. ?No                           



                          laboratory evidence of                           



                          HIV infection. ?Repeat in                           



                          2-4 weeks if acute HIV                           



                          infection is suspected.                           



[facility]anoHepatitis B Surface [id number] 16:32:31





             Test Item    Value        Reference Range Interpretation Comments

 

             HBsAg Semi-Quantitative (test code = Negative     Negative         

         



             5195-3)                                             



[facility] (Aspartate Amino Transfer ; SGOT)
2021 16:00:11





             Test Item    Value        Reference Range Interpretation Comments

 

             AST(SGOT) (test code = 6492740334) 33 U/L       13-40              

       

 

             Lab Interpretation (test code = Normal                             

    



             64463-6)                                            



[facility]Alanine Amino Transeferase ; SGPT)
2021 16:00:11





             Test Item    Value        Reference Range Interpretation Comments

 

             ALTv (test code = 1742-6) 27 U/L       5-35                      

 

             Lab Interpretation (test code = Normal                             

    



             08899-9)                                            



[facility]ano[facility] [id number] 16:00:11





             Test Item    Value        Reference Range Interpretation Comments

 

             CREATININE (test code 0.77 mg/dL   0.50-1.04                 



             = 3072917815)                                        

 

             eGFR (test code =              mL/min/1.73m2              



             3869264489)                                         

 

             YESSENIA (test code = YESSENIA) Association of                           



                          Glomerular Filtration                           



                          Rate (GFR) and Staging                           



                          of Kidney Disease*                           



                          +-----------------------                           



                          +---------------------+-                           



                          ------------------------                           



                          +| GFR (mL/min/1.73 m2)                           



                          ?| With Kidney Damage ?|                           



                          ?Without Kidney                           



                          Damage+-----------------                           



                          ------+-----------------                           



                          ----+-------------------                           



                          ------+| ?>90 ? ? ? ? ?                           



                          ? ? ? ?| ?Stage one ? ?                           



                          ? ? ?| ? Normal ? ? ? ?                           



                          ? ? ?                                  



                          ?+----------------------                           



                          -+---------------------+                           



                          ------------------------                           



                          -+| ?60-89 ? ? ? ? ? ? ?                           



                          ?| ?Stage two ? ? ? ? ?|                           



                          ? Decreased GFR ? ? ? ?                           



                          +-----------------------                           



                          +---------------------+-                           



                          ------------------------                           



                          +| ?30-59 ? ? ? ? ? ? ?                           



                          ?| ?Stage three ? ? ? ?|                           



                          ? Stage three ? ? ? ? ?                           



                          +-----------------------                           



                          +---------------------+-                           



                          ------------------------                           



                          +| ?15-29 ? ? ? ? ? ? ?                           



                          ?| ?Stage four ? ? ? ? |                           



                          ? Stage four ? ? ? ? ?                           



                          ?+----------------------                           



                          -+---------------------+                           



                          ------------------------                           



                          -+| ?<15 (or dialysis) ?                           



                          ?| ?Stage five ? ? ? ? |                           



                          ? Stage five ? ? ? ? ?                           



                          ?+----------------------                           



                          -+---------------------+                           



                          ------------------------                           



                          -+ *Each stage assumes                           



                          the associated GFR level                           



                          has been in effect for                           



                          at least three months.                           



                          ?Stages 1 to 5, with or                           



                          without kidney disease,                           



                          indicate chronic kidney                           



                          disease. Notes:                           



                          Determination of stages                           



                          one and two (with eGFR                           



                          >59mL/min/1.73 m2)                           



                          requires estimation of                           



                          kidney damage for at                           



                          least three months as                           



                          defined by structural or                           



                          functional abnormalities                           



                          of the kidney,                           



                          manifested by                           



                          either:Pathological                           



                          abnormalities or Markers                           



                          of kidney damage                           



                          (including abnormalities                           



                          in the composition of                           



                          the blood or urine or                           



                          abnormalities in imaging                           



                          tests).                                



[facility]ano[facility]ric Acid [id number] 16:00:11





             Test Item    Value        Reference Range Interpretation Comments

 

             URIC ACID (test code = 3111842915) 7.3 mg/dL    2.9-6.0      H     

       

 

             Lab Interpretation (test code = Abnormal                           

    



             26906-5)                                            



[facility]anoLactate [id number] 16:00:10





             Test Item    Value        Reference Range Interpretation Comments

 

             LDH (test code = 0379618498) 599 U/L      300-600                  

 

 

             Lab Interpretation (test code = Normal                             

    



             49370-4)                                            



[facility]anoUrinalysis2021-[facility] 15:46:35





             Test Item    Value        Reference Range Interpretation Comments

 

             APPEARANCE (test code = Cloudy       Clear        A            



             1777277101)                                         

 

             COLOR (test code = Yellow       Yellow                    



             6391297339)                                         

 

             PH (test code =              4.8-8.0                   



             4360763312)                                         

 

             SP GRAVITY (test code =              1.003-1.030               



             4511519346)                                         

 

             GLU U QUAL (test code = Normal       Normal                    



             1399339588)                                         

 

             BLOOD (test code = Negative     Negative                  



             0389915446)                                         

 

             KETONES (test code = Negative     Negative                  



             6051762961)                                         

 

             PROTEIN (test code = Negative     Negative                  



             2887-8)                                             

 

             UROBILIN (test code = Normal       Normal                    



             0795996738)                                         

 

             BILIRUBIN (test code = Negative     Negative                  



             8925012449)                                         

 

             NITRITE (test code = Negative     Negative                  



             3055135964)                                         

 

             LEUK OZZY (test code = 500/uL       Negative     A            



             5780117911)                                         

 

             RBC/HPF (test code =              See_Comment  H             [Autom

ated message]



             2466972321)                                         The system [employer]



                                                                 generated this 

result



                                                                 transmitted ref

erence



                                                                 range: 0 - 3 HP

F. The



                                                                 reference range

 was



                                                                 not used to int

erpret



                                                                 this result as



                                                                 normal/abnormal

.

 

             WBC/HPF (test code =              See_Comment  H             [Autom

ated message]



             7955923553)                                         The system [employer]



                                                                 generated this 

result



                                                                 transmitted ref

erence



                                                                 range: 0 - 5 HP

F. The



                                                                 reference range

 was



                                                                 not used to int

erpret



                                                                 this result as



                                                                 normal/abnormal

.

 

             BACTERIA (test code = Many         Negative     A            



             5847216875)                                         

 

             MUCOUS (test code = Slight       Negative LPF A            



             3544858916)                                         

 

             SQ EPITH (test code =              See_Comment  H             [Auto

mated message]



             0211641764)                                         The system [employer]



                                                                 generated this 

result



                                                                 transmitted ref

erence



                                                                 range: <=2 HPF.

 The



                                                                 reference range

 was



                                                                 not used to int

erpret



                                                                 this result as



                                                                 normal/abnormal

.

 

             ASCORBIC ACID (test code Negative                               



             = 9293970752)                                        

 

             Lab Interpretation (test Abnormal                               



             code = 23030-0)                                        



[facility] ON[facility] [id number] 15:35:45





             Test Item    Value        Reference Range Interpretation Comments

 

             ABO & RH (test code A POSITIVE                             Performe

d at [facility]



             = 20)                                               Laboratory Serv

ices -



                                                                 [facility]

[address] [place] [address]



                                                                 [id number] Free:



                                                                 [phone]

A No.



                                                                 [id number]

 

             IAT (test code = Negative                               Performed a

t [facility]



             1185)                                               Laboratory Serv

ices -



                                                                 [facility]

01



                                                                 [facility]

s



                                                                 [id number] Free:



                                                                 [phone]

A No.



                                                                 [id number]



[facility] Ratio Urine [id number] 
15:25:07





             Test Item    Value        Reference Range Interpretation Comments

 

             T. PROT U (test code = 2888-6) 14 mg/dL                            

   

 

             CREAT U (test code = 9431068280) 172.5 mg/dL                       

     

 

             Protein/Creatinine Ratio Urine              0.0-2.0                

   



             (test code = 6993900875)                                        



[facility] with [id number] 15:14:28





             Test Item    Value        Reference Range Interpretation Comments

 

             WBC (test code =              See_Comment                [Automated



             8990-2)                                             message] The sy

stem



                                                                 which generated



                                                                 this result



                                                                 transmitted



                                                                 reference range

:



                                                                 4.30 - 11.10



                                                                 10*3/?L. The



                                                                 reference range

 was



                                                                 not used to



                                                                 interpret this



                                                                 result as



                                                                 normal/abnormal

.

 

             RBC (test code =              See_Comment  L             [Automated



             979-8)                                              message] The sy

stem



                                                                 which generated



                                                                 this result



                                                                 transmitted



                                                                 reference range

:



                                                                 3.93 - 5.25



                                                                 10*6/?L. The



                                                                 reference range

 was



                                                                 not used to



                                                                 interpret this



                                                                 result as



                                                                 normal/abnormal

.

 

             HGB (test code = 11.4 g/dL    11.6-15.0    L            



             718-7)                                              

 

             HCT (test code = 32.7 %       35.7-45.2    L            



             4544-3)                                             

 

             MCV (test code = 91.1 fL      80.6-95.5                 



             787-2)                                              

 

             MCH (test code = 31.8 pg      25.9-32.8                 



             785-6)                                              

 

             MCHC (test code = 34.9 g/dL    31.6-35.1                 



             786-4)                                              

 

             RDW-SD (test code = 40.4 fL      39.0-49.9                 



             30161-8)                                            

 

             RDW-CV (test code = 12.4 %       12.0-15.5                 



             788-0)                                              

 

             PLT (test code =              See_Comment                [Automated



             777-3)                                              message] The sy

stem



                                                                 which generated



                                                                 this result



                                                                 transmitted



                                                                 reference range

:



                                                                 166 - 358 10*3/

?L.



                                                                 The reference r

alan



                                                                 was not used to



                                                                 interpret this



                                                                 result as



                                                                 normal/abnormal

.

 

             MPV (test code = 11.2 fL      9.5-12.9                  



             11012-2)                                            

 

             NRBC/100 WBC (test              See_Comment                [Automat

ed



             code = 5646075293)                                        message] 

The system



                                                                 which generated



                                                                 this result



                                                                 transmitted



                                                                 reference range

:



                                                                 0.0 - 10.0 /100



                                                                 WBCs. The refer

ence



                                                                 range was not u

sed



                                                                 to interpret th

is



                                                                 result as



                                                                 normal/abnormal

.

 

             NRBC x10^3 (test code <0.01        See_Comment                [Auto

mated



             = [id number])                                        message] The s

ystem



                                                                 which generated



                                                                 this result



                                                                 transmitted



                                                                 reference range

:



                                                                 10*3/?L. The



                                                                 reference range

 was



                                                                 not used to



                                                                 interpret this



                                                                 result as



                                                                 normal/abnormal

.

 

             GRAN MAT (NEUT) % 66.0 %                                 



             (test code = 770-8)                                        

 

             IMM GRAN % (test code 0.30 %                                 



             = 9373087243)                                        

 

             LYMPH % (test code = 24.6 %                                 



             736-9)                                              

 

             MONO % (test code = 7.6 %                                  



             5905-5)                                             

 

             EOS % (test code = 1.1 %                                  



             713-8)                                              

 

             BASO % (test code = 0.4 %                                  



             706-2)                                              

 

             GRAN MAT x10^3(ANC) 6.51 10*3/uL 1.88-7.09                 



             (test code =                                        



             7393633465)                                         

 

             IMM GRAN x10^3 (test 0.03 10*3/uL 0.00-0.06                 



             code = 7980900051)                                        

 

             LYMPH x10^3 (test code 2.43 10*3/uL 1.32-3.29                 



             = 731-0)                                            

 

             MONO x10^3 (test code 0.75 10*3/uL 0.33-0.92                 



             = 742-7)                                            

 

             EOS x10^3 (test code = 0.11 10*3/uL 0.03-0.39                 



             711-2)                                              

 

             BASO x10^3 (test code 0.04 10*3/uL 0.01-0.07                 



             = 704-7)                                            

 

             Lab Interpretation Abnormal                               



             (test code = 62381-5)                                        



[facility] NOW RAPID TESTING[facility]2021 
13:34:03





             Test Item    Value        Reference Range Interpretation Comments

 

             SARS-CoV-2 Rapid ID NOW Not Detected Not Detected              



             (test code = 42890-4)                                        

 

             YESSENIA (test code = YESSENIA) ID NOW COVID-19 Assay                        

   



                          is an isothermal                           



                          nucleic acid                           



                          amplification test                           



                          intended for the                           



                          qualitative detection                           



                          of nucleic acid from                           



                          SARS-CoV-2 viral RNA                           



                          in nasopharyngeal (NP)                           



                          specimens. It is used                           



                          under Emergency Use                           



                          Authorization (EUA) by                           



                          FDA. The limit of                           



                          detection (LOD) of the                           



                          assay is 125 Genome                           



                          Equivalents/mL. A                           



                          positive result is                           



                          indicative of the                           



                          presence of SARS-CoV-2                           



                          RNA. ?Clinical                           



                          correlation with                           



                          patient history and                           



                          other diagnostic                           



                          information is                           



                          necessary to determine                           



                          patient infection                           



                          status. A negative                           



                          (Not Detected) result                           



                          does not preclude                           



                          SARS-CoV-2 infection.                           



                          In patients with                           



                          clinical symptoms and                           



                          other tests that are                           



                          consistent with                           



                          SARS-CoV-2 infection,                           



                          negative results                           



                          should be treated as                           



                          presumptive negative                           



                          and a new specimen                           



                          should be tested with                           



                          alternative PCR                           



                          molecular test.                           



                          Invalid: Please                           



                          collect a new specimen                           



                          for repeat patient                           



                          testing if clinically                           



                          indicated.                             

 

             Lab Interpretation Normal                                 



             (test code = 48058-3)                                        



[facility] URINALYSIS W/O SPECIFIC [id number]
21:24:00





             Test Item    Value        Reference Range Interpretation Comments

 

             POCT PH U (test code = 3254) .            5-8                      

 

 

             POCT U LEUK EST (test code = .            Negative - Negative      

        



             3)                                               

 

             POCT U NIT (test code = 3262) .            Negative - Negative     

         

 

             POCT U PROT (test code = 3259) negative     Negative - Negative    

          

 

             POCT U GLU (test code = 3256) negative     Negative - Negative     

         

 

             POCT U KETONE (test code = 3258) .            Negative - Negative  

            

 

             POCT U BLD (test code = 3257) .            Negative - Negative     

         



[facility] URINALYSIS W SPECIFIC [id number] 
19:30:00





             Test Item    Value        Reference Range Interpretation Comments

 

             POCT U SP GRAV (test code = .            1.005-1.025               



             5)                                               

 

             POCT PH U (test code = 3254) .            5-8                      

 

 

             POCT U LEUK EST (test code = .            Negative - Negative      

        



             3263)                                               

 

             POCT U NIT (test code = 3262) .            Negative - Negative     

         

 

             POCT U PROT (test code = 3259) trace        Negative - Negative    

          

 

             POCT U GLU (test code = 3256) negative     Negative - Negative     

         

 

             POCT U KETONE (test code = 3258) .            Negative - Negative  

            

 

             POCT U UROBILI (test code = .            0.2-1                     



             3260)                                               

 

             POCT U BILI (test code = 3261) .            Negative - Negative    

          

 

             POCT U BLD (test code = 3257) .            Negative - Negative     

         

 

             POCT U COLOR (test code = 3266)                                    

    

 

             POCT U APPEAR (test code = 3267)                                   

     



[facility] URINALYSIS W SPECIFIC [id number] 
20:27:00





             Test Item    Value        Reference Range Interpretation Comments

 

             POCT U SP GRAV (test code = .            1.005-1.025               



             3255)                                               

 

             POCT PH U (test code = 3254) .            5-8                      

 

 

             POCT U LEUK EST (test code = .            Negative - Negative      

        



             3263)                                               

 

             POCT U NIT (test code = 3262) .            Negative - Negative     

         

 

             POCT U PROT (test code = 3259) trace        Negative - Negative    

          

 

             POCT U GLU (test code = 3256) negative     Negative - Negative     

         

 

             POCT U KETONE (test code = 3258) .            Negative - Negative  

            

 

             POCT U UROBILI (test code = .            0.2-1                     



             3260)                                               

 

             POCT U BILI (test code = 3261) .            Negative - Negative    

          

 

             POCT U BLD (test code = 3257) .            Negative - Negative     

         

 

             POCT U COLOR (test code = 3266)                                    

    

 

             POCT U APPEAR (test code = 3267)                                   

     



[facility] URINALYSIS W SPECIFIC [id number] 
19:50:00





             Test Item    Value        Reference Range Interpretation Comments

 

             POCT U SP GRAV (test code = 3255) .            1.005-1.025         

      

 

             POCT PH U (test code = 3254) .            5-8                      

 

 

             POCT U LEUK EST (test code = 3263) .            Negative - Negative

              

 

             POCT U NIT (test code = 3262) .            Negative - Negative     

         

 

             POCT U PROT (test code = 3259) trace        Negative - Negative    

          

 

             POCT U GLU (test code = 3256) neg          Negative - Negative     

         

 

             POCT U KETONE (test code = 3258) .            Negative - Negative  

            

 

             POCT U UROBILI (test code = 3260) .            0.2-1               

      

 

             POCT U BILI (test code = 3261) .            Negative - Negative    

          

 

             POCT U BLD (test code = 3257) .            Negative - Negative     

         

 

             POCT U COLOR (test code = 3266)                                    

    

 

             POCT U APPEAR (test code = 3267)                                   

     



[facility] URINALYSIS W SPECIFIC [id number] 
16:54:00





             Test Item    Value        Reference Range Interpretation Comments

 

             POCT U SP GRAV (test code = 3255) .            1.005-1.025         

      

 

             POCT PH U (test code = 3254) .            5-8                      

 

 

             POCT U LEUK EST (test code = 3263) .            Negative - Negative

              

 

             POCT U NIT (test code = 3262) .            Negative - Negative     

         

 

             POCT U PROT (test code = 3259) Trace        Negative - Negative    

          

 

             POCT U GLU (test code = 3256) Neg          Negative - Negative     

         

 

             POCT U KETONE (test code = 3258) .            Negative - Negative  

            

 

             POCT U UROBILI (test code = 3260) .            0.2-1               

      

 

             POCT U BILI (test code = 3261) .            Negative - Negative    

          

 

             POCT U BLD (test code = 3257) .            Negative - Negative     

         

 

             POCT U COLOR (test code = 3266)                                    

    

 

             POCT U APPEAR (test code = 3267)                                   

     



[facility] URINALYSIS W SPECIFIC [id number] 
16:54:00





             Test Item    Value        Reference Range Interpretation Comments

 

             POCT U SP GRAV (test code = 3255) .            1.005-1.025         

      

 

             POCT PH U (test code = 3254) .            5-8                      

 

 

             POCT U LEUK EST (test code = 3263) .            Negative - Negative

              

 

             POCT U NIT (test code = 3262) .            Negative - Negative     

         

 

             POCT U PROT (test code = 3259) Trace        Negative - Negative    

          

 

             POCT U GLU (test code = 3256) Neg          Negative - Negative     

         

 

             POCT U KETONE (test code = 3258) .            Negative - Negative  

            

 

             POCT U UROBILI (test code = 3260) .            0.2-1               

      

 

             POCT U BILI (test code = 3261) .            Negative - Negative    

          

 

             POCT U BLD (test code = 3257) .            Negative - Negative     

         

 

             POCT U COLOR (test code = 3266)                                    

    

 

             POCT U APPEAR (test code = 3267)                                   

     



[facility] URINALYSIS W SPECIFIC [id number] 
16:54:00





             Test Item    Value        Reference Range Interpretation Comments

 

             POCT U SP GRAV (test code = 3255) .            1.005-1.025         

      

 

             POCT PH U (test code = 3254) .            5-8                      

 

 

             POCT U LEUK EST (test code = 3263) .            Negative - Negative

              

 

             POCT U NIT (test code = 3262) .            Negative - Negative     

         

 

             POCT U PROT (test code = 3259) Trace        Negative - Negative    

          

 

             POCT U GLU (test code = 3256) Neg          Negative - Negative     

         

 

             POCT U KETONE (test code = 3258) .            Negative - Negative  

            

 

             POCT U UROBILI (test code = 3260) .            0.2-1               

      

 

             POCT U BILI (test code = 3261) .            Negative - Negative    

          

 

             POCT U BLD (test code = 3257) .            Negative - Negative     

         

 

             POCT U COLOR (test code = 3266)                                    

    

 

             POCT U APPEAR (test code = 3267)                                   

     



[facility] URINALYSIS W SPECIFIC [id number] 
14:45:00





             Test Item    Value        Reference Range Interpretation Comments

 

             POCT U SP GRAV (test code = 3255) .            1.005-1.025         

      

 

             POCT PH U (test code = 3254) .            5-8                      

 

 

             POCT U LEUK EST (test code = 3263) .            Negative - Negative

              

 

             POCT U NIT (test code = 3262) .            Negative - Negative     

         

 

             POCT U PROT (test code = 3259) trace        Negative - Negative    

          

 

             POCT U GLU (test code = 3256) neg          Negative - Negative     

         

 

             POCT U KETONE (test code = 3258) .            Negative - Negative  

            

 

             POCT U UROBILI (test code = 3260) .            0.2-1               

      

 

             POCT U BILI (test code = 3261) .            Negative - Negative    

          

 

             POCT U BLD (test code = 3257) .            Negative - Negative     

         

 

             POCT U COLOR (test code = 3266)                                    

    

 

             POCT U APPEAR (test code = 3267)                                   

     



[facility] URINALYSIS W SPECIFIC [id number] 
15:47:00





             Test Item    Value        Reference Range Interpretation Comments

 

             POCT U SP GRAV (test code = 3255) .            1.005-1.025         

      

 

             POCT PH U (test code = 3254) .            5-8                      

 

 

             POCT U LEUK EST (test code = 3263) .            Negative - Negative

              

 

             POCT U NIT (test code = 3262) .            Negative - Negative     

         

 

             POCT U PROT (test code = 3259) trace        Negative - Negative    

          

 

             POCT U GLU (test code = 3256) neg          Negative - Negative     

         

 

             POCT U KETONE (test code = 3258) .            Negative - Negative  

            

 

             POCT U UROBILI (test code = 3260) .            0.2-1               

      

 

             POCT U BILI (test code = 3261) .            Negative - Negative    

          

 

             POCT U BLD (test code = 3257) .            Negative - Negative     

         

 

             POCT U COLOR (test code = 3266) .                                  

    

 

             POCT U APPEAR (test code = 3267)                                   

     



[facility] URINALYSIS W SPECIFIC [id number] 
15:47:00





             Test Item    Value        Reference Range Interpretation Comments

 

             POCT U SP GRAV (test code = 3255) .            1.005-1.025         

      

 

             POCT PH U (test code = 3254) .            5-8                      

 

 

             POCT U LEUK EST (test code = 3263) .            Negative - Negative

              

 

             POCT U NIT (test code = 3262) .            Negative - Negative     

         

 

             POCT U PROT (test code = 3259) trace        Negative - Negative    

          

 

             POCT U GLU (test code = 3256) neg          Negative - Negative     

         

 

             POCT U KETONE (test code = 3258) .            Negative - Negative  

            

 

             POCT U UROBILI (test code = 3260) .            0.2-1               

      

 

             POCT U BILI (test code = 3261) .            Negative - Negative    

          

 

             POCT U BLD (test code = 3257) .            Negative - Negative     

         

 

             POCT U COLOR (test code = 3266) .                                  

    

 

             POCT U APPEAR (test code = 3267)                                   

     



[facility] URINALYSIS W SPECIFIC [id number] 
19:41:00





             Test Item    Value        Reference Range Interpretation Comments

 

             POCT U SP GRAV (test code = 3255) .            1.005-1.025         

      

 

             POCT PH U (test code = 3254) .            5-8                      

 

 

             POCT U LEUK EST (test code = 3263) .            Negative - Negative

              

 

             POCT U NIT (test code = 3262) .            Negative - Negative     

         

 

             POCT U PROT (test code = 3259) trace        Negative - Negative    

          

 

             POCT U GLU (test code = 3256) neg          Negative - Negative     

         

 

             POCT U KETONE (test code = 3258) .            Negative - Negative  

            

 

             POCT U UROBILI (test code = 3260) .            0.2-1               

      

 

             POCT U BILI (test code = 3261) .            Negative - Negative    

          

 

             POCT U BLD (test code = 3257) .            Negative - Negative     

         

 

             POCT U COLOR (test code = 3266)                                    

    

 

             POCT U APPEAR (test code = 3267)                                   

     



[facility] ONLY PAP SMEAR-LIQUID BASED2020-10-10 
19:55:00





             Test Item    Value        Reference Range Interpretation Comments

 

             Case Report (test code Gynecologic Cytology ?                      

     



             = [id number]) ? ? ? ? ? ? ? ? ? ? ? ?                           



                          ? ?Case: [id number] ?                           



                          ? ? ? ? ? ? ? ? ? ? ? ?                           



                          ? ? Authorizing                           



                          Provider: ?Donna Renner, [place] ?                           



                          ?Collected: ? ? ? ? ?                           



                          10/05/2020 1029 ? ? ? ?                           



                          ? ?Ordering Location: ?                           



                          ? [facility]- ?                           



                          ? ? ? Received: ? ? ? ?                           



                          ? ?10/05/2020 2243 ? ?                           



                          ? ? ? ? ? ? ? ? ? ? ? ?                           



                          ? ? ? [place] ? ? ? ?                           



                          ? ? ? ? ? ? ? ? ? ? ? ?                           



                          ? ? ? ? ? ? ? ? ? ? ? ?                           



                          ? ? ? ? ? First Screen:                           



                          ? ? ? ? ?Jeffrey Madrigal                           



                          ? ? ? ? ? ? ? ? ? ? ? ?                           



                          ? ? ? ? ? ? ? ? ? ? ? ?                           



                          ? ? ? ? ? ? Specimen: ?                           



                          ?Liquid Based Pap                           



                          Preparation, CERVIX ? ?                           



                          ? ? ? ? ? ? ? ? ? ? ? ?                           



                          ? ? ? ? ? ? ? ? ? ?                           

 

             Clinical Information Routine Pap Smear                           



             (test code =                                        



             4436117759)                                         

 

             Specimen Adequacy Satisfactory for                           



             (test code = 96422-3) Evaluation(Endocervical                      

     



                          /Transformation Zone                           



                          Component Absent)                           

 

             Interpretation (test Negative for                           



             code = 91155-0) intraepithelial lesion                           



                          or malignancy                           

 

             LMP (test code = Pregnancy                              



             [id number])                                         

 

             Educational Note (test m6kafHSkKNOrq5fgOLVpgGH                     

      



             code = 1652130046) uZzEwMzNcZnRuYmpcdWMxIH                         

  



                          ttmzPaINppb2GxZ2RaXQIyQ                           



                          FxhbnNpXGRlZmxhbmcxMDMz                           



                          BPI4xlVgQJQlLExyCPLtCDg                           



                          jVu5jmCLcjPxiXvCiKEKkr3                           



                          silmGMqrtbgWo4u7iiHMDuM                           



                          tK6aWXbHCdwZ3epyoJiwRCl                           



                          VUDuEAe6oP22DTZgsN5bkHK                           



                          vRFsjgxMyAgS3SUtmFMMrYq                           



                          F6WRQdyXRwEMCzM5huWZAgK                           



                          KJyQ1FaTQ3rPqemTmx5JHE8                           



                          IDtccmVkMFxncmVlbjBcYmx                           



                          9GCHgL531YWE7mMpgk9ahMQ                           



                          Z9GRGyTNVmGqKwTi2kqQScZ                           



                          512ZLInADOPOBJnmSr1HKKm                           



                          xpTyvmUzvNSXh634U343v2f                           



                          dPULflvYtwPmQrkmzq4mrQ4                           



                          19XHBhcGVydzEyMjQwXHBhc                           



                          LKkmQS7TDOrSL0xxdkaQFpz                           



                          UMamMUHswnX4NOIbeMAiN9N                           



                          vZXMmRV2iqgytHEB0ONfnMM                           



                          XvODF9YkXgLTDwf1Spwfl2I                           



                          pDbxy3ybq04OBU5v5KtzYne                           



                          ZOM8RKK6QqZmBe1yiOEbHRI                           



                          fZB8pYiVkmSUzTNNxlo60xQ                           



                          gbULuzcjPqxU2kUuAwXGRgt                           



                          RGvATNhTW0wnRZpVOXnqF2i                           



                          cmxjXHBnYnJkcmhlYWRccGd                           



                          yrtNpTp1rtTwrUOC4RLukY2                           



                          esmF3xXbF3SHseO5qkmS4bE                           



                          Yw1SBfimZI4STAqpV7eDT0t                           



                          pjqyt7acAOonXAidIDMzkqC                           



                          9mlD8YXIqgUUiR5FbsB9hZW                           



                          YaND0oeucvg8orYEM7QEerS                           



                          MNlAIK1SmTwHVYyx6Bpqyh0                           



                          QzCgo2WrcCBvJZarJ36rq86                           



                          0BUQohvXzK7zmmDDqhrkktY                           



                          SvvsaqHConkrK1ULFfRFMgQ                           



                          WluXGYxXGZzMjJcbGFuZzEw                           



                          MzNcaGljaFxmMVxkYmNoXGY                           



                          mKVmkS1wzKdPyF8ZeAEPoPl                           



                          AiyOKIFKG7wAYhzFFwhJKtt                           



                          K3zjTTeXBEgLLXgw5QuMMju                           



                          QSWvh3YiPKRrsK9jNPFpt8D                           



                          dlHQlgFOvwFc5RGUfdjNltV                           



                          VgqX06ioFaZZ6qNJBpZWBhQ                           



                          FWarsEpsKGon2PkJnRXkNDf                           



                          rPSbeOAfYJIgRF9kcQ8zLWH                           



                          cylVuCZT3hBPdn7ibTXLqh8                           



                          IcRfivsMR4TZ6kISRkxNJkR                           



                          Z0pH8T8tJZmWPJkVMTjLWxw                           



                          OP3di0LpkCh7PHXjXHA7oKJ                           



                          xXnRdSuYqzGhidhAqBH8gsZ                           



                          tjLmTlubTwQt1lwM27QGQeL                           



                          S5vVHHnTFzixCFzabBxGARp                           



                          oY9oG7CoXSFiS63fXCDgAKV                           



                          tlF8ziE9ciyMiguVtuxVvl9                           



                          6uPY1tWFThoW1qtIdwiG1ug                           



                          mUgdGhlIGVmZmVjdCBvZiBm                           



                          PUwiXEUxHJoxvKw7IQThDWR                           



                          8uDChNfIrCP86iqYbBDDuUM                           



                          13RLBmb0ErLPPzCELsEJ1zd                           



                          vJmLJC3lcLqh75dbKd4CBwy                           



                          rLDkzC0iPJgshPHamFQfFnK                           



                          snMZpURpoNQReMY0gKRFiJA                           



                          55IHVuZXhwbGFpbmVkIGNsa                           



                          S0gX3LfIBIfA02wOOLaKXLl                           



                          kH9azG5swetheiUlOYGsfWO                           



                          zcyBvZiBhbnkgUGFwIFRlc3                           



                          BsxxZdsKe8FtyufWOwvirpX                           



                          VxmczIyXGxhbmcxMDMzXGhp                           



                          W5foEsLfWYLijUppGIlhk7M                           



                          oXGYxXGZzMjJccGFyfX0=                           

 

             Embedded Images (test                                        



             code = 4026986307)                                        



[id number]24:00





             Test Item    Value        Reference Range Interpretation Comments

 

             URINE CULTURE (test 10,000 - 100,000 CFU/mL                        

   



             code = 630-4) mixed aerobic organisms -                           



                          suggests endogenous                           



                          microbial contamination                           



[facility]2020-10-07 02:22:00





             Test Item    Value        Reference Range Interpretation Comments

 

             Trichomonas Nucleic Positive     Negative     A            



             Acid (test code =                                        



             95306-6)                                            

 

             YESSENIA (test code = YESSENIA) Reliable results are                         

  



                          dependent on adequate                           



                          specimen collection. ?                           



                          A positive result                           



                          obtained from a                           



                          patient after                           



                          therapeutic treatment                           



                          cannot be interpreted                           



                          as indicating the                           



                          presence of viable                           



                          organisms. ?For                           



                          patients on whom a                           



                          false positive result                           



                          may have adverse                           



                          psychosocial impact,                           



                          retesting is advised.                           



                          Indeterminate: Unable                           



                          to generate a valid                           



                          test result on this                           



                          specimen. ?Please                           



                          submit a new specimen                           



                          for repeat testing if                           



                          clinically indicated.                           



                          Trichomonas nucleic                           



                          acid amplification                           



                          testing (NAAT) has not                           



                          been validated for                           



                          medico-legal specimens                           



                          (sexual abuse in                           



                          maisha-pubertal and                           



                          pre-pubertal children,                           



                          sexual assault, and                           



                          legal cases). ?Wet                           



                          mount with microscopic                           



                          observation and                           



                          culture for                            



                          Trichomonas vaginalis                           



                          from clinically                           



                          appropriate sites are                           



                          the methods of choice                           



                          in these cases.                           



                          Results from this                           



                          testing should be                           



                          interpreted in                           



                          conjunction with other                           



                          laboratory and                           



                          clinical data                           



                          available to the                           



                          clinician.                             

 

             Lab Interpretation Abnormal                               



             (test code = 06452-6)                                        



[facility]LAM[facility]2020-10-07 
01:42:00





             Test Item    Value        Reference Range Interpretation Comments

 

             C. trachomatis Nucleic Negative     Negative                  



             Acid (test code =                                        



             37190-0)                                            

 

             N. gonorrhoeae Nucleic Negative     Negative                  



             Acid (test code =                                        



             66194-1)                                            

 

             YESSENIA (test code = YESSENIA) Reliable results are                         

  



                          dependent on adequate                           



                          specimen collection. ?                           



                          A positive result                           



                          obtained from a                           



                          patient after                           



                          therapeutic treatment                           



                          cannot be interpreted                           



                          as indicating the                           



                          presence of viable                           



                          organisms. ?For                           



                          patients on whom a                           



                          false positive result                           



                          may have adverse                           



                          psychosocial impact,                           



                          retesting is advised.                           



                          Indeterminate: Unable                           



                          to generate a valid                           



                          test result on this                           



                          specimen. ?Please                           



                          submit a new specimen                           



                          for repeat testing if                           



                          clinically indicated.                           



                          Chlamydia                              



                          trachomatis/Neisseria                           



                          gonorrhoeae nucleic                           



                          acid amplification                           



                          testing (NAAT) has not                           



                          been validated for                           



                          medico-legal specimens                           



                          (sexual abuse in                           



                          maisha-pubertal and                           



                          pre-pubertal children,                           



                          sexual assault, and                           



                          legal cases). ?Culture                           



                          for Chlamydia                           



                          trachomatis and/or                           



                          Neisseria gonorrhoeae                           



                          from clinically                           



                          appropriate sites is                           



                          the method of choice                           



                          in these cases. ?                           



                          Results from this                           



                          testing should be                           



                          interpreted in                           



                          conjunction with other                           



                          laboratory and                           



                          clinical data                           



                          available to the                           



                          clinician.                             

 

             Lab Interpretation Normal                                 



             (test code = 16132-7)                                        



[facility]2020-10-06 15:05:00





             Test Item    Value        Reference Range Interpretation Comments

 

             Rubella screen IgG Equivocal    Negative                  



             (test code =                                        



             1712233530)                                         

 

             YESSENIA (test code = YESSENIA) Positive - Indicates the                     

      



                          patient was exposed to                           



                          Rubella through                           



                          infection or                           



                          vaccination.Negative -                           



                          Indicates the patient                           



                          could be susceptible to                           



                          Rubella                                



                          infection.Equivocal - A                           



                          second specimen should                           



                          be sent.                               



[facility]





             Test Item    Value        Reference Range Interpretation Comments

 

             VZV IgG antibody Negative     Negative                  



             (test code = 34944-9)                                        

 

             YESSENIA (test code = YESSENIA) Positive - Indicates the                     

      



                          patient was exposed to                           



                          VZV through infection or                           



                          vaccination.Negative -                           



                          Indicates the patient                           



                          could be susceptible to                           



                          VZV infection.Equivocal                           



                          - A second specimen                           



                          should be sent for                           



                          testing.                               



[facility] 
14:18:00





             Test Item    Value        Reference Range Interpretation Comments

 

             Syphilis IgG/IgM (test Non-reactive Non-reactive              



             code = 69287-2)                                        

 

             YESSENIA (test code = YESSENIA)  Non-reactive - No                           



                          serologic evidence                           



                          of T. pallidum                           



                          infection. Cannot                           



                          exclude incubating                           



                          or early syphilis.                           



                          Submit a second                           



                          specimen in 2-4                           



                          weeks if syphilis is                           



                          clinically                             



                          suspected. Equivocal                           



                          - Further testing to                           



                          follow. Reactive -                           



                          Further testing to                           



                          follow.                                

 

             Lab Interpretation (test Normal                                 



             code = 70486-8)                                        



[facility]2020[facility]10[facility] 06:55:00





             Test Item    Value        Reference Range Interpretation Comments

 

             CoV-2 IgG (test code Negative     Negative                  Negativ

e result



             = 23321-1)                                          does not rule o

ut



                                                                 acute SARS-CoV-

2



                                                                 infection.



                                                                 Clinical



                                                                 correlation as



                                                                 well as molecul

ar



                                                                 diagnostic test



                                                                 are recommended



                                                                 to rule out acu

te



                                                                 infection if



                                                                 clinically



                                                                 indicated.

 

             YESSENIA (test code = YESSENIA) This test has                           



                          been approved by                           



                          FDA for                                



                          emergency use.                           

 

             Lab Interpretation Normal                                 



             (test code = 63901-4)                                        



[facility] WITH REFLEX2020-10-06 05:55:00





             Test Item    Value        Reference Range Interpretation Comments

 

             HIV          Negative     Negative                  



             Semi-quantitative                                        



             (test code =                                        



             36586-2)                                            

 

             YESSENIA (test code = Non-reactive for HIV-1                           



             YESSENIA)         antigen and HIV-1/HIV-2                           



                          antibodies. ?No                           



                          laboratory evidence of                           



                          HIV infection. ?Repeat in                           



                          2-4 weeks if acute HIV                           



                          infection is suspected.                           



[facility] BLOOD [id number] 04:31:32





             Test Item    Value        Reference Range Interpretation Comments

 

             ABO & RH (test code A POSITIVE                             Performe

d at [facility]



             = 20)                                               Laboratory Serv

ices -



                                                                 GAL Blood Bank3

[address]



                                                                 [facility]

s



                                                                 [id number] Free:



                                                                 [phone]

A No.



                                                                 [id number]

 

             IAT (test code = Negative                               Performed a

t [facility]



             1185)                                               Laboratory Serv

ices -



                                                                 GAL Blood Bank3





                                                                 [facility]

s



                                                                 [id number] Free:



                                                                 [phone]

A No.



                                                                 [id number]



[facility]2020-10-06 04:03:00





             Test Item    Value        Reference Range Interpretation Comments

 

             HBsAg Semi-Quantitative (test code = Negative     Negative         

         



             5195-3)                                             



[facility] H[facility] POST [id number] 
03:22:00





             Test Item    Value        Reference Range Interpretation Comments

 

             GLUC 1 HR (test code = 1384975936) 105 mg/dL    120-170      L     

       

 

             Lab Interpretation (test code = Abnormal                           

    



             61314-0)                                            



[facility] WITH DIFF2020-10-06 03:08:00





             Test Item    Value        Reference Range Interpretation Comments

 

             WBC (test code =              See_Comment                [Automated



             6109-2)                                             message] The sy

stem



                                                                 which generated



                                                                 this result



                                                                 transmitted



                                                                 reference range

:



                                                                 4.30 - 11.10



                                                                 10*3/?L. The



                                                                 reference range

 was



                                                                 not used to



                                                                 interpret this



                                                                 result as



                                                                 normal/abnormal

.

 

             RBC (test code =              See_Comment                [Automated



             073-8)                                              message] The sy

stem



                                                                 which generated



                                                                 this result



                                                                 transmitted



                                                                 reference range

:



                                                                 3.93 - 5.25



                                                                 10*6/?L. The



                                                                 reference range

 was



                                                                 not used to



                                                                 interpret this



                                                                 result as



                                                                 normal/abnormal

.

 

             HGB (test code = 12.4 g/dL    11.6-15                   



             718-7)                                              

 

             HCT (test code = 36.0 %       35.7-45.2                 



             4544-3)                                             

 

             MCV (test code = 89.3 fL      80.6-95.5                 



             787-2)                                              

 

             MCH (test code = 30.8 pg      25.9-32.8                 



             785-6)                                              

 

             MCHC (test code = 34.4 g/dL    31.6-35.1                 



             786-4)                                              

 

             RDW-SD (test code = 38.5 fL      39-49.9      L            



             85092-2)                                            

 

             RDW-CV (test code = 11.9 %       12-15.5      L            



             788-0)                                              

 

             PLT (test code =              See_Comment                [Automated



             777-3)                                              message] The sy

stem



                                                                 which generated



                                                                 this result



                                                                 transmitted



                                                                 reference range

:



                                                                 166 - 358 10*3/

?L.



                                                                 The reference r

alan



                                                                 was not used to



                                                                 interpret this



                                                                 result as



                                                                 normal/abnormal

.

 

             MPV (test code = 10.5 fL      9.5-12.9                  



             78284-3)                                            

 

             NRBC/100 WBC (test              See_Comment                [Automat

ed



             code = 7326331663)                                        message] 

The system



                                                                 which generated



                                                                 this result



                                                                 transmitted



                                                                 reference range

:



                                                                 0.0 - 10.0 /100



                                                                 WBCs. The refer

ence



                                                                 range was not u

sed



                                                                 to interpret th

is



                                                                 result as



                                                                 normal/abnormal

.

 

             NRBC x10^3 (test code <0.01        See_Comment                [Auto

mated



             = [id number])                                        message] The s

ystem



                                                                 which generated



                                                                 this result



                                                                 transmitted



                                                                 reference range

:



                                                                 10*3/?L. The



                                                                 reference range

 was



                                                                 not used to



                                                                 interpret this



                                                                 result as



                                                                 normal/abnormal

.

 

             GRAN MAT (NEUT) % 58.8 %                                 



             (test code = 770-8)                                        

 

             IMM GRAN % (test code 0.30 %                                 



             = 1007209900)                                        

 

             LYMPH % (test code = 30.1 %                                 



             736-9)                                              

 

             MONO % (test code = 8.4 %                                  



             5905-5)                                             

 

             EOS % (test code = 1.9 %                                  



             713-8)                                              

 

             BASO % (test code = 0.5 %                                  



             706-2)                                              

 

             GRAN MAT x10^3(ANC) 4.31 10*3/uL 1.88-7.09                 



             (test code =                                        



             7660838736)                                         

 

             IMM GRAN x10^3 (test <0.03        0-0.06                    



             code = 8280025247)                                        

 

             LYMPH x10^3 (test code 2.21 10*3/uL 1.32-3.29                 



             = 731-0)                                            

 

             MONO x10^3 (test code 0.62 10*3/uL 0.33-0.92                 



             = 742-7)                                            

 

             EOS x10^3 (test code = 0.14 10*3/uL 0.03-0.39                 



             711-2)                                              

 

             BASO x10^3 (test code 0.04 10*3/uL 0.01-0.07                 



             = 704-7)                                            

 

             Lab Interpretation Abnormal                               



             (test code = 12037-6)                                        



[facility] PREGNANCY TEST2020-10-05 14:16:00





             Test Item    Value        Reference Range Interpretation Comments

 

             POCT PREG (test code = 1605) Positive                              

 

 

             On board controls acceptable with C Yes                            

        



             Line (test code = 3574)                                        

 

             POCT PREG LOT # (test code = 3575)                                 

       

 

             POCT PREG TEST  DATE (test                                   

     



             code = 357)                                        



[facility] URINALYSIS W/O SPECIFIC GRAVITY2020-10-05
14:16:00





             Test Item    Value        Reference Range Interpretation Comments

 

             POCT PH U (test code = 3254) 6 mg/dl      5-8                      

 

 

             POCT U LEUK EST (test code = 2+           Negative - Negative      

        



             3263)                                               

 

             POCT U NIT (test code = 3262) neg          Negative - Negative     

         

 

             POCT U PROT (test code = 3259) 1+           Negative - Negative    

          

 

             POCT U GLU (test code = 3256) neg          Negative - Negative     

         

 

             POCT U KETONE (test code = 3258) neg          Negative - Negative  

            

 

             POCT U BLD (test code = 3257) neg          Negative - Negative     

         



[facility] PREGNANCY TEST2020-10-05 14:16:00





             Test Item    Value        Reference Range Interpretation Comments

 

             POCT PREG (test code = 1605) Positive                              

 

 

             On board controls acceptable with C Yes                            

        



             Line (test code = 3574)                                        

 

             POCT PREG LOT # (test code = 3575)                                 

       

 

             POCT PREG TEST  DATE (test                                   

     



             code = 357)                                        



[facility] URINALYSIS W/O SPECIFIC GRAVITY2020-10-05
14:16:00





             Test Item    Value        Reference Range Interpretation Comments

 

             POCT PH U (test code = 3254) 6 mg/dl      5-8                      

 

 

             POCT U LEUK EST (test code = 2+           Negative - Negative      

        



             3263)                                               

 

             POCT U NIT (test code = 3262) neg          Negative - Negative     

         

 

             POCT U PROT (test code = 3259) 1+           Negative - Negative    

          

 

             POCT U GLU (test code = 3256) neg          Negative - Negative     

         

 

             POCT U KETONE (test code = 3258) neg          Negative - Negative  

            

 

             POCT U BLD (test code = 3257) neg          Negative - Negative     

         



[facility] PREGNANCY TEST2020-10-05 14:16:00





             Test Item    Value        Reference Range Interpretation Comments

 

             POCT PREG (test code = 1605) Positive                              

 

 

             On board controls acceptable with C Yes                            

        



             Line (test code = 3574)                                        

 

             POCT PREG LOT # (test code = 3575)                                 

       

 

             POCT PREG TEST  DATE (test                                   

     



             code = 357)                                        



[facility] GRAVITY2020-10-05
14:16:00





             Test Item    Value        Reference Range Interpretation Comments

 

             POCT PH U (test code = 3254) 6 mg/dl      5-8                      

 

 

             POCT U LEUK EST (test code = 2+           Negative - Negative      

        



             3263)                                               

 

             POCT U NIT (test code = 3262) neg          Negative - Negative     

         

 

             POCT U PROT (test code = 3259) 1+           Negative - Negative    

          

 

             POCT U GLU (test code = 3256) neg          Negative - Negative     

         

 

             POCT U KETONE (test code = 3258) neg          Negative - Negative  

            

 

             POCT U BLD (test code = 3257) neg          Negative - Negative     

         



[facility] BHCG (QUANTITATIVE)2020 23:17:00





             Test Item    Value        Reference Range Interpretation Comments

 

             BETA HCG (test              See_Comment                [Automated m

essage]



             code =                                              The system [employer]

h



             7940105003)                                         generated this



                                                                 result transmit

dieudonne



                                                                 reference range

:



                                                                 Non-pregnant fe

male



                                                                 and male patien

ts:



                                                                 <5 mIU/mL. The



                                                                 reference range

 was



                                                                 not used to



                                                                 interpret this



                                                                 result as



                                                                 normal/abnormal

.

 

             YESSENIA (test code  Gestational Age ? ?                           



             = YESSENIA)       ? ? ? ? ?Range                           



                          (mIU/mL) 1-10 ?Weeks                           



                          ? ? ? ? ? ? ? ?                           



                          ?[id number]                           



                          Weeks ? ? ? ? ? ? ?                           



                          ? ?31426-09030114-15                           



                          Weeks ? ? ? ? ? ? ?                           



                          ? ?8431-72757923-29                           



                          Weeks ? ? ? ? ? ? ?                           



                          ? ?[id number]                           



                          Biotin has been                           



                          reported to cause a                           



                          negative bias,                           



                          interpret results                           



                          relative to                            



                          patient's use of                           



                          biotin.                                



[facility]. METABOLIC PANEL (53514)2020 
21:27:00





             Test Item    Value        Reference Range Interpretation Comments

 

             NA (test code = 137 mmol/L   135-145                   



             [id number])                                         

 

             K (test code = 4.3 mmol/L   3.5-5                     



             3751359061)                                         

 

             CL (test code = 103 mmol/L                       



             1580861028)                                         

 

             CO2 TOTAL (test code = 25 mmol/L    23-31                     



             [id number])                                         

 

             AGAP (test code =              2-16                      



             7492297820)                                         

 

             BUN (test code = 11 mg/dL     7-23                      



             4549545291)                                         

 

             GLUCOSE (test code = 90 mg/dL                         



             5021739404)                                         

 

             CREATININE (test code = 0.54 mg/dL   0.5-1.04                  



             2533967427)                                         

 

             TOTAL BILI (test code = 0.4 mg/dL    0.1-1.1                   



             6539174539)                                         

 

             CALCIUM (test code = 9.4 mg/dL    8.6-10.6                  



             6914776116)                                         

 

             T PROTEIN (test code = 7.1 g/dL     6.3-8.2                   



             6403796765)                                         

 

             ALBUMIN (test code = 4.2 g/dL     3.5-5                     



             4773261389)                                         

 

             ALK PHOS (test code = 29 U/L              L            



             3014982979)                                         

 

             ALTv (test code = 14 U/L       5-35                      



             1742-6)                                             

 

             AST(SGOT) (test code = 21 U/L       13-40                     



             9396947531)                                         

 

             eGFR Calculation              mL/min/1.73m2              



             (Non-)                                        



             (test code =                                        



             9298675426)                                         

 

             eGFR Calculation              mL/min/1.73m2              



             (African American)                                        



             (test code =                                        



             8079343671)                                         

 

             YESSENIA (test code = YESSENIA) Association of                           



                          Glomerular Filtration                           



                          Rate (GFR) and Staging                           



                          of Kidney Disease*                           



                          +---------------------                           



                          --+-------------------                           



                          --+-------------------                           



                          ------+| GFR                           



                          (mL/min/1.73 m2) ?|                           



                          With Kidney Damage ?|                           



                          ?Without Kidney                           



                          Damage+---------------                           



                          --------+-------------                           



                          --------+-------------                           



                          ------------+| ?>90 ?                           



                          ? ? ? ? ? ? ? ?|                           



                          ?Stage one ? ? ? ? ?|                           



                          ? Normal ? ? ? ? ? ? ?                           



                          ?+--------------------                           



                          ---+------------------                           



                          ---+------------------                           



                          -------+| ?60-89 ? ? ?                           



                          ? ? ? ? ?| ?Stage two                           



                          ? ? ? ? ?| ? Decreased                           



                          GFR ? ? ? ?                            



                          +---------------------                           



                          --+-------------------                           



                          --+-------------------                           



                          ------+| ?30-59 ? ? ?                           



                          ? ? ? ? ?| ?Stage                           



                          three ? ? ? ?| ? Stage                           



                          three ? ? ? ? ?                           



                          +---------------------                           



                          --+-------------------                           



                          --+-------------------                           



                          ------+| ?15-29 ? ? ?                           



                          ? ? ? ? ?| ?Stage four                           



                          ? ? ? ? | ? Stage four                           



                          ? ? ? ? ?                              



                          ?+--------------------                           



                          ---+------------------                           



                          ---+------------------                           



                          -------+| ?<15 (or                           



                          dialysis) ? ?| ?Stage                           



                          five ? ? ? ? | ? Stage                           



                          five ? ? ? ? ?                           



                          ?+--------------------                           



                          ---+------------------                           



                          ---+------------------                           



                          -------+ *Each stage                           



                          assumes the associated                           



                          GFR level has been in                           



                          effect for at least                           



                          three months. ?Stages                           



                          1 to 5, with or                           



                          without kidney                           



                          disease, indicate                           



                          chronic kidney                           



                          disease. Notes:                           



                          Determination of                           



                          stages one and two                           



                          (with eGFR                             



                          >59mL/min/1.73 m2)                           



                          requires estimation of                           



                          kidney damage for at                           



                          least three months as                           



                          defined by structural                           



                          or functional                           



                          abnormalities of the                           



                          kidney, manifested by                           



                          either:Pathological                           



                          abnormalities or                           



                          Markers of kidney                           



                          damage (including                           



                          abnormalities in the                           



                          composition of the                           



                          blood or urine or                           



                          abnormalities in                           



                          imaging tests).                           

 

             Lab Interpretation Abnormal                               



             (test code = 61502-8)                                        



[facility]





             Test Item    Value        Reference Range Interpretation Comments

 

             APPEARANCE (test code = Clear        Clear                     



             9241056547)                                         

 

             COLOR (test code = Straw        Yellow       A            



             0209439004)                                         

 

             PH (test code =              4.8-8.0                   



             8945376756)                                         

 

             SP GRAVITY (test code =              1.003-1.030               



             4876411325)                                         

 

             GLU U QUAL (test code = Normal       Normal                    



             8952659238)                                         

 

             BLOOD (test code = Negative     Negative                  



             5983089548)                                         

 

             KETONES (test code = Negative     Negative                  



             9586797119)                                         

 

             PROTEIN (test code = Negative     Negative                  



             2887-8)                                             

 

             UROBILIN (test code = Normal       Normal                    



             4966215002)                                         

 

             BILIRUBIN (test code = Negative     Negative                  



             0205555532)                                         

 

             NITRITE (test code = Negative     Negative                  



             5338665281)                                         

 

             LEUK OZZY (test code = Negative     Negative                  



             6408481397)                                         

 

             RBC/HPF (test code =              See_Comment                [Autom

ated message]



             5897767157)                                         The system [employer]



                                                                 generated this 

result



                                                                 transmitted ref

erence



                                                                 range: 0 - 3 HP

F. The



                                                                 reference range

 was



                                                                 not used to int

erpret



                                                                 this result as



                                                                 normal/abnormal

.

 

             WBC/HPF (test code =              See_Comment                [Autom

ated message]



             5237608323)                                         The system [employer]



                                                                 generated this 

result



                                                                 transmitted ref

erence



                                                                 range: 0 - 5 HP

F. The



                                                                 reference range

 was



                                                                 not used to int

erpret



                                                                 this result as



                                                                 normal/abnormal

.

 

             BACTERIA (test code = Negative     Negative                  



             5322568528)                                         

 

             SQ EPITH (test code =              HPF                       



             0918475991)                                         

 

             Lab Interpretation (test Abnormal                               



             code = 12410-0)                                        



[facility]





             Test Item    Value        Reference Range Interpretation Comments

 

             LIPASE (test code = 3504483995) 51 U/L       0-220                 

    

 

             Lab Interpretation (test code = Normal                             

    



             13696-5)                                            



[facility] WITH [id number] 21:22:00





             Test Item    Value        Reference Range Interpretation Comments

 

             WBC (test code =              See_Comment                [Automated

 message]



             6690-2)                                             The system [employer]



                                                                 generated this 

result



                                                                 transmitted ref

erence



                                                                 range: 4.30 - 1

1.10



                                                                 10*3/?L. The re

ference



                                                                 range was not u

sed to



                                                                 interpret this 

result



                                                                 as normal/abnor

mal.

 

             RBC (test code =              See_Comment                [Automated

 message]



             789-8)                                              The system [employer]



                                                                 generated this 

result



                                                                 transmitted ref

erence



                                                                 range: 3.93 - 5

.25



                                                                 10*6/?L. The re

ference



                                                                 range was not u

sed to



                                                                 interpret this 

result



                                                                 as normal/abnor

mal.

 

             HGB (test code = 12.5 g/dL    11.6-15                   



             718-7)                                              

 

             HCT (test code = 36.6 %       35.7-45.2                 



             4544-3)                                             

 

             MCV (test code = 90.8 fL      80.6-95.5                 



             787-2)                                              

 

             MCH (test code = 31.0 pg      25.9-32.8                 



             785-6)                                              

 

             MCHC (test code = 34.2 g/dL    31.6-35.1                 



             786-4)                                              

 

             RDW-SD (test code 39.9 fL      39-49.9                   



             = 54495-5)                                          

 

             RDW-CV (test code 12.0 %       12-15.5                   



             = 788-0)                                            

 

             PLT (test code =              See_Comment                [Automated

 message]



             777-3)                                              The system [employer]



                                                                 generated this 

result



                                                                 transmitted ref

erence



                                                                 range: 166 - 35

8



                                                                 10*3/?L. The re

ference



                                                                 range was not u

sed to



                                                                 interpret this 

result



                                                                 as normal/abnor

mal.

 

             MPV (test code = 10.2 fL      9.5-12.9                  



             91925-9)                                            

 

             NRBC/100 WBC (test              See_Comment                [Automat

ed message]



             code = 2104279157)                                        The syste

[employer] which



                                                                 generated this 

result



                                                                 transmitted ref

erence



                                                                 range: 0.0 - 10

.0 /100



                                                                 WBCs. The refer

ence



                                                                 range was not u

sed to



                                                                 interpret this 

result



                                                                 as normal/abnor

mal.

 

             NRBC x10^3 (test <0.01        See_Comment                [Automated

 message]



             code = 1084286555)                                        The syste

m which



                                                                 generated this 

result



                                                                 transmitted ref

erence



                                                                 range: 10*3/?L.

 The



                                                                 reference range

 was not



                                                                 used to interpr

et this



                                                                 result as



                                                                 normal/abnormal

.

 

             GRAN MAT (NEUT) % 58.1 %                                 



             (test code =                                        



             770-8)                                              

 

             IMM GRAN % (test 0.30 %                                 



             code = 8110293653)                                        

 

             LYMPH % (test code 30.8 %                                 



             = 736-9)                                            

 

             MONO % (test code 9.2 %                                  



             = 5905-5)                                           

 

             EOS % (test code = 1.1 %                                  



             713-8)                                              

 

             BASO % (test code 0.5 %                                  



             = 706-2)                                            

 

             GRAN MAT     3.77 10*3/uL 1.88-7.09                 



             x10^3(ANC) (test                                        



             code = 9576792046)                                        

 

             IMM GRAN x10^3 <0.03        0-0.06                    



             (test code =                                        



             9560149450)                                         

 

             LYMPH x10^3 (test 2.00 10*3/uL 1.32-3.29                 



             code = 731-0)                                        

 

             MONO x10^3 (test 0.60 10*3/uL 0.33-0.92                 



             code = 742-7)                                        

 

             EOS x10^3 (test 0.07 10*3/uL 0.03-0.39                 



             code = 711-2)                                        

 

             BASO x10^3 (test 0.03 10*3/uL 0.01-0.07                 



             code = 704-7)                                        



[facility] [id number] 20:51:00





             Test Item    Value        Reference Range Interpretation Comments

 

             POCT PREG (test code = 1605) positive                              

 

 

             POCT PREG LOT # (test code = 3575) [id number]                      

       

 

             POCT PREG TEST  DATE (test 2021                        

     



             code = 3576)                                        

 

             Lab Interpretation (test code = Normal                             

    



             06729-4)                                            



[facility] w/ [id number] 
10:21:19Patient:   TOBIN KOHLI                             MRN:    
[id number] Date/Time201810:00 CDTReason for ExamAbdominal painReportCT 
OF THE ABDOMEN AND PELVIS WITH  CONTRASTLocation R 16HISTORY: Abdominal 
painTECHNIQUE:5 millimeters contrast  enhanced axial images of the abdomen and 
pelvis provided in venous delays.  No PO contrast was administered.   The images
were reviewed in soft tissue, lung and bone windows. Sagittal and coronal images
were reformatted. One or more the followingdose reduction techniques is 
utilized: Use of iterative reconstruction, automated exposure control, a
djustment of the mAs and Kv for the patient's weight. .1mGy-cm.  
Estimated dose savings 29%.COMPARISON:  None.CT abdomen and pelvis dated 
2018FINDINGS:Lung Bases:  No significant abnormality.Abdomen 
findings:Liver: No significant abnormality.Gallbladder: No significant 
abnormality.Pancreas: No significant abnormality.Spleen: No significant 
abnormality.Kidneys: No significant abnormality.Adrenals: No significant 
abnormality.Bowel: No significant abnormality. No dilated bowel loops or areas 
of bowel wall thickening.Appendix: Well identified and normal.Pelvis 
findings:Bladder: No significant abnormality.Uterus: No significant 
abnormality.Adnexal regions: No significant abnormality.Osseous: Note of a 
partially sacralized L5 on the right. Osseous structures are otherwise 
unremarkable. Vascular: Vascular structures enhance normally.Lymph nodes: No 
evidence of lymphadenopathy in the abdomenand pelvis.IMPRESSION:Exam 
Date/Time2018 10:00 CDTReportNo acute intra-abdominal findings. Normal right
lower quadrant appendix. No free air or free fluid.***** Final *****Dictated by:
   MD Ofelia, Guadalupe FDictated DT/TM: 2018 10:16 amSigned by:  
MD Gilbert Eniola FSigned (Electronic Signature):  2018 10:21 am
20700&amp;PELVIS W/[id number] 06:43:42CT OF THE ABDOMEN AND PELVIS WITH 
CONTRASTHISTORY: PainTECHNIQUE:5 millimeters contrast  enhanced axial images of 
the abdomen and pelviswere performed with     venous delays. The images were 
reviewed in softtissue, lung and bone windows. 2 mm coronal images were 
reformatted. 100mL of Isovue 370 is given IV. The GFR is greater than 
60COMPARISON:  CT abdomen pelvis, 2017FINDINGS:Abdomen findings:    The 
liver, adrenals, spleen, pancreas, gallbladder, kidneys and lungwindows are 
unremarkable.Pelvis findings:    The distal ureters, bladder   , uterus, adnexa 
, appendix andremainder of the bowel are unremarkable. Bone windows are 
normal.IMPRESSION:Unremarkable exam. No acute findings.Urinalysis Complete
2017 05:48:00





             Test Item    Value        Reference Range Interpretation Comments

 

             Color (test code = Ashley        Yellow,Straw,Pl A            



             COLOR)                    yellow                    

 

             Clarity (test code = Clear        Clear        N            



             CLAR)                                               

 

             Specific Gravity (test 1.032        1.001-1.035  N            



             code = SPGR)                                        

 

             pH (test code = PH) 5.0          5.0-9.0      N            

 

             Ketone (test code = 5 mg/dL      Negative     A            



             KET)                                                

 

             Glucose (test code = Negative mg/dL Negative     N            



             GLUCUR)                                             

 

             Protein (test code = 75 mg/dL     Negative     A            



             PROT)                                               

 

             Bilirubin (test code = See IctoTest mg/dL Negative     A           

 



             BILI)                                               

 

             Occult Blood (test code Large        Negative     A            



             = UDOB)                                             

 

             Urobilinogen (test code 1.0 mg/dL    0.2-1.0      N            



             = UROB)                                             

 

             Nitrite (test code = Negative     Negative     N            



             NIT)                                                

 

             Leuk Esterase (test Small        Negative     A            



             code = LEUK)                                        

 

             Ictotest (test code = Confirmed Negative Negative,Confirmed N      

      



             ICTOTEST)                 Negative                  

 

             Micros Exam (test code Indicated                              



             = MEXAM)                                            

 

             Epithelial Cells (test 15-19 /LPF   0-30         A            



             code = EPI)                                         

 

             WBC, Urine (test code = 2-5 /HPF     0-5          A            



             UWBC)                                               

 

             RBC, Urine (test code = None Seen /HPF 0-5          A            



             URBC)                                               

 

             Bacteria (test code = Few /HPF                               



             BACT)                                               



CBC with [id number] 05:42:00





             Test Item    Value        Reference Range Interpretation Comments

 

             WBC (test code = WBC) 7.1 K/cumm   4.4-10.5     N            

 

             RBC (test code = RBC) 4.36 M/cumm  3.75-5.20    N            

 

             Hemoglobin (test code = HGB) 12.8 gm/dL   12.2-14.8    N           

 

 

             Hematocrit (test code = HCT) 38.2 %       36.5-44.4    N           

 

 

             MCV (test code = MCV) 87.7 fL             N            

 

             MCH (test code = MCH) 29.4 pg      27.0-32.5    N            

 

             MCHC (test code = MCHC) 33.5 g/dL    32.0-37.5    N            

 

             RDW (test code = RDW) 14.6 %       11.5-14.5    H            

 

             Platelet Count (test code = 269 K/cumm   140-440      N            



             PLTCT)                                              

 

             MPV (test code = MPV) 8.4 fL                                 

 

             Diff Method (test code = DIFFM) Auto                               

    

 

             Neutrophil (test code = NEUT) 51.1 %       36-70        N          

  

 

             Lymphocyte (test code = LYMPH) 36.0 %       12-44        N         

   

 

             Monocyte (test code = MONO) 9.0 %        0-11         N            

 

             Eosinophil (test code = EOS) 3.3 %        0-7          N           

 

 

             Basophil (test code = BASO) 0.5 %        0-2          N            

 

             Neutro Abs (test code = ANEUT) 3.6 K/cumm   1.6-7.4      N         

   

 

             Lymph Abs (test code = ALYMPH) 2.6 K/cumm   0.5-4.6      N         

   

 

             Mono Abs (test code = AMONO) 0.6 K/cumm   0.0-1.2      N           

 

 

             Eos Abs (test code = AEOS) 0.24 K/cumm  0.00-0.74    N            

 

             Baso Abs (test code = ABASO) 0.0 K/cumm   0.00-0.21    N           

 



BHCG, Serum, [id number] 05:28:00





             Test Item    Value        Reference Range Interpretation Comments

 

             Preg Qual [Se] (test code = BSHCG) Negative     Negative     N     

       



Comprehensive Metabolic [id number] 05:28:00





             Test Item    Value        Reference Range Interpretation Comments

 

             Sodium (test code = 141 mmol/L   135-145      N            



             NA)                                                 

 

             Potassium (test 3.8 mmol/L   3.5-5.1      N            



             code = K)                                           

 

             Chloride (test code 104 mmol/L          N            



             = CL)                                               

 

             Carbon Dioxide 22 mmol/L    22-29        N            



             (test code = CO2)                                        

 

             Glucose (test code 86 mg/dL            N            



             = GLU)                                              

 

             Blood Urea Nitrogen 17 mg/dL     6-20         N            



             (test code = BUN)                                        

 

             Creatinine (test 0.9 mg/dL    0.5-0.9      N            



             code = CREAT)                                        

 

             Calcium (test code 9.2 mg/dL    8.3-10.5     N            



             = CA)                                               

 

             Prot Total (test 7.6 g/dL     6.4-8.3      N            



             code = TP)                                          

 

             Albumin (test code 4.4 g/dL     3.5-5.2      N            



             = ALB)                                              

 

             A/G Ratio (test 1.4 Ratio                              



             code = AGRATIO)                                        

 

             Globulin (test code 3.2          2.9-3.1      H            



             = GLOB)                                             

 

             Bili Total (test 0.5 mg/dL    0.1-0.9      N            



             code = TBIL)                                        

 

             Alk Phos (test code 48 U/L              N            



             = APHOS)                                            

 

             AST (test code = 17 U/L       1-32         N            



             AST)                                                

 

             ALT (test code = 15 U/L       1-33         N            



             ALT)                                                

 

             BUN/Creatinine 18.9                                   



             Ratio (test code =                                        



             BCRATIO)                                            

 

             Anion Gap (test 15 mmol/L    7-16         N            



             code = AGAP)                                        

 

             Estimated GFR (test >60                                    eGFR (es

timated



             code = GFR)  mL/min/1.73m2                           Glomerular Caden

tration



                                                                 Rate) is an est

imated



                                                                 value,calculate

d from



                                                                 the patient's s

kvng



                                                                 creatinine usin

g the



                                                                 MDRD equation.I

t is



                                                                 NOT the patient

's



                                                                 actual GFR. The

 eGFR



                                                                 provides a more



                                                                 clinicallyusefu

l



                                                                 measure of kidn

ey



                                                                 disease than se

rum



                                                                 creatinine



                                                                 alone.***This



                                                                 calculation bhaskar

es sex



                                                                 and race into



                                                                 account, if the



                                                                 informationis



                                                                 provided. If th

e race



                                                                 is not provided

, and



                                                                 the patient



                                                                 isAfrican-Ameri

can,



                                                                 multiply by 1.2

12. If



                                                                 sex is not prov

ided,



                                                                 and thepatient 

is



                                                                 female, multipl

y by



                                                                 0.742. Results 

for



                                                                 patients <18 ye

ars



                                                                 ofage have not 

been



                                                                 validated by th

e MDRD



                                                                 study and joyul

d be



                                                                 interpretedwith



                                                                 caution.eGFR Re

sult



                                                                 Interpretation:

eGFR >



                                                                 or = 60 is in t

he



                                                                 Normal RangeeGF

R < 60



                                                                 may mean kidney



                                                                 diseaseeGFR < 1

5 may



                                                                 mean kidney



                                                                 failure***Range

s



                                                                 recommended by 

the



                                                                 [employer]



                                                                 Foundation,http

://nkd



                                                                 ep.nih.gov



[id number] 05:24:00





             Test Item    Value        Reference Range Interpretation Comments

 

             Lipase (test code = LIP) 25 U/L       13-60        N            



[id number]&amp; PELVIS W/O [id number] 20:05:31LOCATION: S 17HISTORY:  
16-year-old female who presents with abdominal pain.COMMENT:     Axial CT cherry
ging of this patient's abdomen and pelvis was obtained fromthe diaphragm to the 
pelvic floor withoutIV contrast. Coronal andsagittal soft tissue reconstructions
 were included.    One or more of the following dose reduction techniques were 
used:Automated exposure control, adjustment of the mA and/or kV according 
thepatient size, and/or utilization of iterative reconstruction technique.DLP: 
851.9 mGy-cmCONTRAST: NoneFINDINGS:The visualized lung bases are clear. The 
cardiac silhouette isunremarkable.The liver, spleen, pancreas, adrenal glands, 
kidneys, and gallbladderare unremarkable in this unenhanced CT study.The upper 
intestinal tract down the small intestine are unremarkable. Anormal-appearing ap
pendix is seen. The colon is unremarkable.There is no ascites or adenopathy 
present in the abdomen or in thepelvis.In the pelvis the left ovary is enlarged 
and cystic measuring 5 cm indiameter. The uterus and right ovary are 
unremarkable and the urinarybladder is unremarkable.The vascular anatomy is un
remarkable.The musculoskeletal anatomy is unremarkable.IMPRESSION:This patient's
 left ovary is enlarged and cystic. Pelvic ultrasoundcorrelation is 
suggested.Otherwise the appearance of this patient's abdomen and pelvis in 
thisunenhanced CT study is unremarkable.Urinalysis [id number] 19:34:00





             Test Item    Value        Reference Range Interpretation Comments

 

             Color (test code = COLOR) Yellow       Yellow,Straw,Pl N           

 



                                       yellow                    

 

             Clarity (test code = Clear        Clear        N            



             CLAR)                                               

 

             Specific Gravity (test 1.023        1.001-1.035  N            



             code = SPGR)                                        

 

             pH (test code = PH) 8.0          5.0-9.0      N            

 

             Ketone (test code = KET) 15 mg/dL     Negative     A            

 

             Glucose (test code = Negative mg/dL Negative     N            



             GLUCUR)                                             

 

             Protein (test code = Negative mg/dL Negative     N            



             PROT)                                               

 

             Bilirubin (test code = Negative mg/dL Negative     N            



             BILI)                                               

 

             Occult Blood (test code = Negative     Negative     N            



             UDOB)                                               

 

             Urobilinogen (test code = 1.0 mg/dL    0.2-1.0      N            



             UROB)                                               

 

             Nitrite (test code = NIT) Negative     Negative     N            

 

             Leuk Esterase (test code Small        Negative     A            



             = LEUK)                                             

 

             Micros Exam (test code = Indicated                              



             MEXAM)                                              

 

             Epithelial Cells (test 10-14 /LPF   0-30         A            



             code = EPI)                                         

 

             WBC, Urine (test code = 0-1 /HPF     0-5          A            



             UWBC)                                               

 

             RBC, Urine (test code = None Seen /HPF 0-5          A            



             URBC)                                               

 

             Bacteria (test code = Few /HPF                               



             BACT)                                               



Comprehensive Metabolic [id number] 19:16:00





             Test Item    Value        Reference Range Interpretation Comments

 

             Sodium (test code = 138 mmol/L   135-145      N            



             NA)                                                 

 

             Potassium (test 4.4 mmol/L   3.5-5.1      N            HEMOLYZED



             code = K)                                           

 

             Chloride (test code 105 mmol/L          N            



             = CL)                                               

 

             Carbon Dioxide 18 mmol/L    22-29        L            



             (test code = CO2)                                        

 

             Glucose (test code 78 mg/dL            N            



             = GLU)                                              

 

             Blood Urea Nitrogen 11 mg/dL     6-20         N            



             (test code = BUN)                                        

 

             Creatinine (test 0.7 mg/dL    0.5-0.9      N            



             code = CREAT)                                        

 

             Calcium (test code 8.8 mg/dL    8.3-10.5     N            



             = CA)                                               

 

             Prot Total (test 6.9 g/dL     6.4-8.3      N            



             code = TP)                                          

 

             Albumin (test code 4.2 g/dL     3.5-5.2      N            



             = ALB)                                              

 

             A/G Ratio (test 1.6 Ratio                              



             code = AGRATIO)                                        

 

             Globulin (test code 2.7          2.9-3.1      L            



             = GLOB)                                             

 

             Bili Total (test 0.5 mg/dL    0.1-0.9      N            



             code = TBIL)                                        

 

             Alk Phos (test code 41 U/L              N            



             = APHOS)                                            

 

             AST (test code = 33 U/L       1-32         H            HEMOLYZED



             AST)                                                

 

             ALT (test code = 20 U/L       1-33         N            



             ALT)                                                

 

             BUN/Creatinine 15.7                                   



             Ratio (test code =                                        



             BCRATIO)                                            

 

             Anion Gap (test 15 mmol/L    7-16         N            



             code = AGAP)                                        

 

             Estimated GFR (test >60                                    eGFR (es

timated



             code = GFR)  mL/min/1.73m2                           Glomerular Caden

tration



                                                                 Rate) is an est

imated



                                                                 value,calculate

d from



                                                                 the patient's s

kvng



                                                                 creatinine usin

g the



                                                                 MDRD equation.I

t is



                                                                 NOT the patient

's



                                                                 actual GFR. The

 eGFR



                                                                 provides a more



                                                                 clinicallyusefu

l



                                                                 measure of kidn

ey



                                                                 disease than se

rum



                                                                 creatinine



                                                                 alone.***This



                                                                 calculation bhaskar

es sex



                                                                 and race into



                                                                 account, if the



                                                                 informationis



                                                                 provided. If th

e race



                                                                 is not provided

, and



                                                                 the patient



                                                                 isAfrican-Ameri

can,



                                                                 multiply by 1.2

12. If



                                                                 sex is not prov

ided,



                                                                 and thepatient 

is



                                                                 female, multipl

y by



                                                                 0.742. Results 

for



                                                                 patients <18 ye

ars



                                                                 ofage have not 

been



                                                                 validated by th

e MDRD



                                                                 study and shoul

d be



                                                                 interpretedwith



                                                                 caution.eGFR Re

sult



                                                                 Interpretation:

eGFR >



                                                                 or = 60 is in t

he



                                                                 Normal RangeeGF

R < 60



                                                                 may mean kidney



                                                                 diseaseeGFR < 1

5 may



                                                                 mean kidney



                                                                 failure***Range

s



                                                                 recommended by 

the



                                                                 [employer]



                                                                 Foundation,http

://nkd



                                                                 ep.nih.gov



[id number] 19:16:00





             Test Item    Value        Reference Range Interpretation Comments

 

             Lipase (test code = LIP) 24 U/L       13-60        N            



BHCG, Serum, [id number] 19:10:00





             Test Item    Value        Reference Range Interpretation Comments

 

             Preg Qual [Se] (test code = BSHCG) Negative     Negative     N     

       



CBC with [id number] 18:53:00





             Test Item    Value        Reference Range Interpretation Comments

 

             WBC (test code = WBC) 8.1 K/cumm   4.4-10.5     N            

 

             RBC (test code = RBC) 4.00 M/cumm  3.75-5.20    N            

 

             Hemoglobin (test code = HGB) 12.3 gm/dL   12.2-14.8    N           

 

 

             Hematocrit (test code = HCT) 34.4 %       36.5-44.4    L           

 

 

             MCV (test code = MCV) 86.1 fL             N            

 

             MCH (test code = MCH) 30.7 pg      27.0-32.5    N            

 

             MCHC (test code = MCHC) 35.6 g/dL    32.0-37.5    N            

 

             RDW (test code = RDW) 12.8 %       11.5-14.5    N            

 

             Platelet Count (test code = 184 K/cumm   140-440      N            



             PLTCT)                                              

 

             MPV (test code = MPV) 7.8 fL                                 

 

             Diff Method (test code = DIFFM) Auto                               

    

 

             Neutrophil (test code = NEUT) 70.5 %       36-70        H          

  

 

             Lymphocyte (test code = LYMPH) 22.6 %       12-44        N         

   

 

             Monocyte (test code = MONO) 4.2 %        0-11         N            

 

             Eosinophil (test code = EOS) 2.4 %        0-7          N           

 

 

             Basophil (test code = BASO) 0.3 %        0-2          N            

 

             Neutro Abs (test code = ANEUT) 5.7 K/cumm   1.6-7.4      N         

   

 

             Lymph Abs (test code = ALYMPH) 1.8 K/cumm   0.5-4.6      N         

   

 

             Mono Abs (test code = AMONO) 0.3 K/cumm   0.0-1.2      N           

 

 

             Eos Abs (test code = AEOS) 0.19 K/cumm  0.00-0.74    N            

 

             Baso Abs (test code = ABASO) 0.0 K/cumm   0.00-0.21    N           

 



Urinalysis [id number] 18:49:00





             Test Item    Value        Reference Range Interpretation Comments

 

             Color (test code = Ashley        Yellow,Straw,Pl A            



             COLOR)                    yellow                    

 

             Clarity (test code = Sl Cloudy    Clear        A            



             CLAR)                                               

 

             Specific Gravity (test 1.012        1.001-1.035  N            



             code = SPGR)                                        

 

             pH (test code = PH) 5.0          5.0-9.0      N            

 

             Ketone (test code = Negative mg/dL Negative     N            



             KET)                                                

 

             Glucose (test code = Negative mg/dL Negative     N            



             GLUCUR)                                             

 

             Protein (test code = 75 mg/dL     Negative     A            



             PROT)                                               

 

             Bilirubin (test code = See IctoTest mg/dL Negative     A           

 



             BILI)                                               

 

             Occult Blood (test code Large        Negative     A            



             = UDOB)                                             

 

             Urobilinogen (test code 1.0 mg/dL    0.2-1.0      N            



             = UROB)                                             

 

             Nitrite (test code = Positive     Negative     A            



             NIT)                                                

 

             Leuk Esterase (test Large        Negative     A            



             code = LEUK)                                        

 

             Ictotest (test code = Confirmed Negative Negative,Confirmed N      

      



             ICTOTEST)                 Negative                  

 

             Micros Exam (test code Indicated                              



             = MEXAM)                                            

 

             Epithelial Cells (test 3-5 /LPF     0-30         A            



             code = EPI)                                         

 

             WBC, Urine (test code = 31-40 /HPF   0-5          A            



             UWBC)                                               

 

             RBC, Urine (test code = 3-5 /HPF     0-5          A            



             URBC)                                               

 

             Bacteria (test code = Few /HPF                               



             BACT)                                               



BHCG, Urine, [id number] 18:48:00





             Test Item    Value        Reference Range Interpretation Comments

 

             Preg Qual [Ur] (test code = HUHCG) Negative     Negative     N

## 2021-12-03 VITALS — DIASTOLIC BLOOD PRESSURE: 76 MMHG | SYSTOLIC BLOOD PRESSURE: 125 MMHG

## 2021-12-03 VITALS — TEMPERATURE: 98.2 F

## 2021-12-03 VITALS — OXYGEN SATURATION: 100 %

## 2021-12-03 LAB
ALBUMIN SERPL BCP-MCNC: 4.1 G/DL (ref 3.4–5)
ALP SERPL-CCNC: 36 U/L (ref 45–117)
ALT SERPL W P-5'-P-CCNC: 21 U/L (ref 12–78)
AST SERPL W P-5'-P-CCNC: 16 U/L (ref 15–37)
BUN BLD-MCNC: 11 MG/DL (ref 7–18)
GLUCOSE SERPLBLD-MCNC: 87 MG/DL (ref 74–106)
HCT VFR BLD CALC: 37.8 % (ref 36–45)
LIPASE SERPL-CCNC: 116 U/L (ref 73–393)
LYMPHOCYTES # SPEC AUTO: 2.9 K/UL (ref 0.7–4.9)
PMV BLD: 7.7 FL (ref 7.6–11.3)
POTASSIUM SERPL-SCNC: 3.9 MMOL/L (ref 3.5–5.1)
RBC # BLD: 4.17 M/UL (ref 3.86–4.86)

## 2021-12-03 NOTE — EDPHYS
Physician Documentation                                                                           

 [facility]                                                                 

Name: Chris Villar                                                                         

Age: 20 yrs                                                                                       

Sex: Female                                                                                       

: 2000                                                                                   

MRN: [id number]                                                                                   

Arrival Date: 2021                                                                          

Time: 21:28                                                                                       

Account#: [id number]                                                                            

Bed 19                                                                                            

Private MD:                                                                                       

ED Physician Cj Velasquez                                                                      

HPI:                                                                                              

                                                                                             

23:38 This 20 yrs old  Female presents to ER via Ambulatory with complaints of       mariela 

      Pelvic Pain.                                                                                

23:38 The patient presents with abdominal pain in the left lower quadrant. Onset: The         mariela 

      symptoms/episode began/occurred 7 month(s) ago. The patient presents with pelvic pain,      

      that is located in/on the left lower quadrant. Onset: The symptoms/episode                  

      began/occurred 90 day(s) ago. Modifying factors: The symptoms are alleviated by             

      remaining still, the symptoms are aggravated by movement, pressure. Associated signs        

      and symptoms: The patient has no apparent associated signs or symptoms. Severity of         

      symptoms: At their worst the symptoms were moderate, in the emergency department the        

      symptoms are unchanged. The symptoms. The patient is sexually active, reportedly has a      

      single partner.                                                                             

                                                                                                  

OB/GYN:                                                                                           

21:42 LMP 2021                                                                           ld1 

23:38  1, Full Term 1, Premature 0,  0, Living 1                               mariela 

                                                                                                  

Historical:                                                                                       

- Allergies:                                                                                      

21:42 No Known Allergies;                                                                     ld1 

- Home Meds:                                                                                      

21:42 None [Active];                                                                          ld1 

- PMHx:                                                                                           

21:42 Hypertensive disorder;                                                                  ld1 

- PSHx:                                                                                           

21:42  section;                                                                       ld1 

                                                                                                  

- Immunization history:: Adult Immunizations up to date, Client reports having NOT                

  received the Covid vaccine.                                                                     

- Social history:: Smoking status: Patient reports the use of cigarette tobacco                   

  products, smokes one-half pack cigarettes per day, Patient/guardian denies using                

  alcohol, street drugs.                                                                          

                                                                                                  

                                                                                                  

ROS:                                                                                              

23:45 Constitutional: Negative for fever, chills, and weight loss, Eyes: Negative for injury, mariela 

      pain, redness, and discharge, ENT: Negative for injury, pain, and discharge, Neck:          

      Negative for injury, pain, and swelling, Cardiovascular: Negative for chest pain,           

      palpitations, and edema, Respiratory: Negative for shortness of breath, cough,              

      wheezing, and pleuritic chest pain, Back: Negative for injury and pain, : Negative        

      for injury, bleeding, discharge, and swelling, MS/Extremity: Negative for injury and        

      deformity, Skin: Negative for injury, rash, and discoloration, Neuro: Negative for          

      headache, weakness, numbness, tingling, and seizure, Psych: Negative for depression,        

      anxiety, suicide ideation, homicidal ideation, and hallucinations, Allergy/Immunology:      

      Negative for hives, rash, and allergies, Endocrine: Negative for neck swelling,             

      polydipsia, polyuria, polyphagia, and marked weight changes, Hematologic/Lymphatic:         

      Negative for swollen nodes, abnormal bleeding, and unusual bruising.                        

23:45 Abdomen/GI: Positive for abdominal pain, abdominal cramps, of the left lower quadrant.      

                                                                                                  

Exam:                                                                                             

23:45 Constitutional:  This is a well developed, well nourished patient who is awake, alert,  mariela 

      and in no acute distress. Head/Face:  Normocephalic, atraumatic. Eyes:  Pupils equal        

      round and reactive to light, extra-ocular motions intact.  Lids and lashes normal.          

      Conjunctiva and sclera are non-icteric and not injected.  Cornea within normal limits.      

      Periorbital areas with no swelling, redness, or edema. ENT:  Nares patent. No nasal         

      discharge, no septal abnormalities noted.  Tympanic membranes are normal and external       

      auditory canals are clear.  Oropharynx with no redness, swelling, or masses, exudates,      

      or evidence of obstruction, uvula midline.  Mucous membranes moist. Neck:  Trachea          

      midline, no thyromegaly or masses palpated, and no cervical lymphadenopathy.  Supple,       

      full range of motion without nuchal rigidity, or vertebral point tenderness.  No            

      Meningismus. Chest/axilla:  Normal chest wall appearance and motion.  Nontender with no     

      deformity.  No lesions are appreciated. Cardiovascular:  Regular rate and rhythm with a     

      normal S1 and S2.  No gallops, murmurs, or rubs.  Normal PMI, no JVD.  No pulse             

      deficits. Respiratory:  Lungs have equal breath sounds bilaterally, clear to                

      auscultation and percussion.  No rales, rhonchi or wheezes noted.  No increased work of     

      breathing, no retractions or nasal flaring. Back:  No spinal tenderness.  No                

      costovertebral tenderness.  Full range of motion. Skin:  Warm, dry with normal turgor.      

      Normal color with no rashes, no lesions, and no evidence of cellulitis. MS/ Extremity:      

      Pulses equal, no cyanosis.  Neurovascular intact.  Full, normal range of motion. Neuro:     

       Awake and alert, GCS 15, oriented to person, place, time, and situation.  Cranial          

      nerves II-XII grossly intact.  Motor strength 5/5 in all extremities.  Sensory grossly      

      intact.  Cerebellar exam normal.  Normal gait. Psych:  Awake, alert, with orientation       

      to person, place and time.  Behavior, mood, and affect are within normal limits.            

23:45 Abdomen/GI: Inspection: abdomen appears normal, Bowel sounds: normal, Palpation:            

      moderate abdominal tenderness, in the left lower quadrant, Liver: no appreciated            

      palpable abnormalities, Hernia: not appreciated.                                            

                                                                                                  

Vital Signs:                                                                                      

21:37  / 96; Pulse 70; Resp 18; Temp 98.6(O); Pulse Ox 100% on R/A; Weight 81.65 kg;    ld1 

      Height 5 ft. 1 in. (154.94 cm); Pain 8/10;                                                  

22:57  / 66; Pulse 68; Resp 18 S; Pulse Ox 100% on R/A;                                 as6 

                                                                                             

00:48  / 76; Pulse 74; Resp 18 S; Pulse Ox 100% on R/A;                                 as6 

00:52 Temp 98.2(O);                                                                           lt3 

                                                                                             

21:37 Body Mass Index 34.01 (81.65 kg, 154.94 cm)                                             ld1 

                                                                                                  

MDM:                                                                                              

                                                                                             

23:00 Patient medically screened.                                                             [facility] 

23:47 Differential diagnosis: ovarian cyst, uterine fibroids, urinary tract infection,        mariela 

      Endometriosis, Menorrhagia, non-specific abd pain. Data reviewed: vital signs, nurses       

      notes, lab test result(s), radiologic studies, CT scan. Data interpreted: Cardiac           

      monitor: rate is 68 beats/min, rhythm is regular, Pulse oximetry: on room air is 100 %.     

      Test interpretation: by ED physician or midlevel provider: ECG, plain radiologic            

      studies. Counseling: I had a detailed discussion with the patient and/or guardian           

      regarding: the historical points, exam findings, and any diagnostic results supporting      

      the discharge/admit diagnosis, lab results, radiology results, the need for outpatient      

      follow up, for definitive care, an OB/Gyne specialist.                                      

                                                                                                  

                                                                                             

23:10 Order name: Urine Culture                                                               [facility] 

                                                                                             

23:27 Order name: Basic Metabolic Panel                                                       [facility] 

                                                                                             

23:27 Order name: CBC with Diff                                                               [facility] 

23: Order name: Hepatic Function; Complete Time: 00:40                                      [facility] 

                                                                                             

23:27 Order name: Lipase; Complete Time: 00:40                                                [facility] 

                                                                                             

23:27 Order name: Basic Metabolic Panel; Complete Time: 00:40                                 [facility]

                                                                                             

23:10 Order name: Urine Dipstick-Ancillary (obtain specimen); Complete Time: 23:32            [facility] 

                                                                                             

23:10 Order name: Urine Pregnancy Test (obtain specimen); Complete Time: 23:31                [facility] 

                                                                                             

23:27 Order name: CT Abd/Pelvis - IV Contrast Only                                            [facility] 

                                                                                             

23:30 Order name: Urine Dipstick-Ancillary; Complete Time: 00:40                              [facility]

                                                                                             

23:41 Order name: Urine Pregnancy--Ancillary (enter results); Complete Time: 00:40            lp1 

                                                                                             

23:27 Order name: IV Saline Lock; Complete Time: 23:58                                        [facility] 

                                                                                             

23:27 Order name: Labs collected and sent; Complete Time: 23:58                               mariela 

                                                                                                  

Administered Medications:                                                                         

23:58 Drug: NS 0.9% 1000 ml Route: IV; Rate: 1 bolus; Site: right forearm;                    as6 

                                                                                             

01:00 Follow up: Response: No adverse reaction; IV Status: Completed infusion; IV Intake:     as6 

      1000ml                                                                                      

                                                                                             

23:58 Not Given (Patient Refused): morphine 4 mg IVP once; RASS on ADMIN: Combtv4, Very       as6 

      Agttd3, Agttd2, Rstlss1, AlertClm0, Drwsy-1, Lt Sdtn-2, Mod Sdtn-3, Dp Sdtn-4,              

      UnArsble-5                                                                                  

23:58 Drug: Zofran (Ondansetron) 4 mg Route: IVP; Site: right forearm;                        as6 

                                                                                             

00:30 Follow up: Response: No adverse reaction                                                as6 

                                                                                                  

                                                                                                  

Disposition Summary:                                                                              

21 00:53                                                                                    

Discharge Ordered                                                                                 

      Location: Home                                                                          mariela 

      Problem: new                                                                            mariela 

      Symptoms: have improved                                                                 mariela 

      Condition: Stable                                                                       mariela 

      Diagnosis                                                                                   

        - Abdominal tenderness - at pfannenstiel incision site, left                          mariela 

      Followup:                                                                               mariela 

        - With: Private Physician                                                                  

        - When: 2 - 3 days                                                                         

        - Reason: Recheck today's complaints, Continuance of care, Re-evaluation by your           

      physician                                                                                   

      Followup:                                                                               mariela 

        - With:                                                                                    

        - When: 2 - 3 days                                                                         

        - Reason: Recheck today's complaints, Re-evaluation by your physician                      

      Discharge Instructions:                                                                     

        - Discharge Summary Sheet                                                             mariela 

        - Abdominal Pain, Adult                                                               mariela 

        - Pelvic Pain, Female                                                                 mariela 

        - Pelvic Pain, Female, Easy-to-Read                                                   marieal 

        - Abdominal Pain, Adult, Easy-to-Read                                                 mariela 

      Forms:                                                                                      

        - Medication Reconciliation Form                                                      mariela 

        - Thank You Letter                                                                    mariela 

        - Antibiotic Education                                                                mariela 

        - Prescription Opioid Use                                                             mariela 

        - Work release form                                                                   as6 

      Prescriptions:                                                                              

        - Ibuprofen 600 mg Oral Tablet                                                             

            - take 1 tablet by ORAL route every 6 hours As needed take with food; 20 tablet;  mariela 

      Refills: 0, Product Selection Permitted                                                     

        - Tylenol-Codeine #3 300 mg-30 mg Oral                                                     

            - take 2 tablet by ORAL route every 4-6 hours; 15 tablet; Refills: 0, Product     mariela 

      Selection Permitted                                                                         

Signatures:                                                                                       

Dispatcher [employer]                           Cj Larios MD MD cha Dibbern, Lauren, RN                     RN   ld1                                                  

Naren Mendes RN                      RN   as6                                                  

                                                                                                  

**************************************************************************************************

## 2021-12-03 NOTE — ER
Nurse's Notes                                                                                     

 [facility]                                                                 

Name: Chris Villar                                                                         

Age: 20 yrs                                                                                       

Sex: Female                                                                                       

: 2000                                                                                   

MRN: [id number]                                                                                   

Arrival Date: 2021                                                                          

Time: 21:28                                                                                       

Account#: [id number]                                                                            

Bed 19                                                                                            

Private MD:                                                                                       

Diagnosis: Abdominal tenderness-at pfannenstiel incision site, left                               

                                                                                                  

Presentation:                                                                                     

                                                                                             

21:37 Chief complaint: Patient states: I have had intermittent pain since my baby was born 7  ld1 

      months ago, pain in my pelvic area and left hip. Pt reports cramping feeling and severe     

      pain. Coronavirus screen: At this time, the client does not indicate any symptoms           

      associated with coronavirus-19. Ebola Screen: No symptoms or risks identified at this       

      time. Initial Sepsis Screen: Does the patient meet any 2 criteria? No. Patient's            

      initial sepsis screen is negative. Does the patient have a suspected source of              

      infection? No. Patient's initial sepsis screen is negative. Risk Assessment: Do you         

      want to hurt yourself or someone else? Patient reports no desire to harm self or            

      others. Onset of symptoms was 2021.                                            

21:37 Method Of Arrival: Ambulatory                                                           ld1 

21:37 Acuity: BETTY 3                                                                           ld1 

                                                                                                  

Triage Assessment:                                                                                

21:42 General: Appears in no apparent distress. uncomfortable, Behavior is cooperative,       ld1 

      appropriate for age, anxious. Pain: Complains of pain in pelvis Pain does not radiate.      

      Pain currently is 8 out of 10 on a pain scale. Quality of pain is described as crampy,      

      stabbing, throbbing, Pain began gradually, Is intermittent. EENT: No signs and/or           

      symptoms were reported regarding the EENT system. Neuro: Level of Consciousness is          

      awake, alert, obeys commands, Oriented to person, place, time, situation, Appropriate       

      for age. Cardiovascular: Capillary refill < 3 seconds Patient's skin is warm and dry.       

      Respiratory: Airway is patent Respiratory effort is even, unlabored, Respiratory            

      pattern is regular, symmetrical. GI: Abdomen is flat, non-distended. : No signs           

      and/or symptoms were reported regarding the genitourinary system. Derm: No signs and/or     

      symptoms reported regarding the dermatologic system. Musculoskeletal: No signs and/or       

      symptoms reported regarding the musculoskeletal system.                                     

                                                                                                  

OB/GYN:                                                                                           

21:42 LMP 2021                                                                           ld1 

23:38  1, Full Term 1, Premature 0,  0, Living 1                               mariela 

                                                                                                  

Historical:                                                                                       

- Allergies:                                                                                      

21:42 No Known Allergies;                                                                     ld1 

- Home Meds:                                                                                      

21:42 None [Active];                                                                          ld1 

- PMHx:                                                                                           

21:42 Hypertensive disorder;                                                                  ld1 

- PSHx:                                                                                           

21:42  section;                                                                       ld1 

                                                                                                  

- Immunization history:: Adult Immunizations up to date, Client reports having NOT                

  received the Covid vaccine.                                                                     

- Social history:: Smoking status: Patient reports the use of cigarette tobacco                   

  products, smokes one-half pack cigarettes per day, Patient/guardian denies using                

  alcohol, street drugs.                                                                          

                                                                                                  

                                                                                                  

Screenin/03                                                                                             

00:55 Abuse screen: Denies threats or abuse. Nutritional screening: No deficits noted.        as6 

      Tuberculosis screening: No symptoms or risk factors identified. Fall Risk None              

      identified.                                                                                 

                                                                                                  

Assessment:                                                                                       

                                                                                             

22:55 General: Appears in no apparent distress. uncomfortable, Behavior is calm, cooperative. as6 

      Pain: Complains of pain in right lower quadrant and left lower quadrant. Neuro: Level       

      of Consciousness is awake, alert, obeys commands, Oriented to person, place, time,          

      situation. Cardiovascular: Capillary refill < 3 seconds Patient's skin is warm and dry.     

      Respiratory: Airway is patent Trachea midline Respiratory effort is even, unlabored,        

      Respiratory pattern is regular, symmetrical. GI: Reports lower abdominal pain, nausea.      

      : Reports vaginal bleeding that is spotty. Derm: Skin is intact, is healthy with good     

      turgor.                                                                                     

                                                                                                  

Vital Signs:                                                                                      

21:37  / 96; Pulse 70; Resp 18; Temp 98.6(O); Pulse Ox 100% on R/A; Weight 81.65 kg;    ld1 

      Height 5 ft. 1 in. (154.94 cm); Pain 8/10;                                                  

22:57  / 66; Pulse 68; Resp 18 S; Pulse Ox 100% on R/A;                                 as6 

12/                                                                                             

00:48  / 76; Pulse 74; Resp 18 S; Pulse Ox 100% on R/A;                                 as6 

00:52 Temp 98.2(O);                                                                           lt3 

12/                                                                                             

21:37 Body Mass Index 34.01 (81.65 kg, 154.94 cm)                                             ld1 

                                                                                                  

ED Course:                                                                                        

                                                                                             

21:28 Patient arrived in ED.                                                                  ja2 

21:42 Triage completed.                                                                       ld1 

21:42 Arm band placed on left wrist.                                                          ld1 

22:38 Naren Mendes, MARCO is Primary Nurse.                                                    as6 

23:00 Cj Velasquez MD is Attending Physician.                                             [facility] 

23:00 Inserted saline lock: 22 gauge in right forearm, using aseptic technique. Blood         as6 

      collected.                                                                                  

12                                                                                             

00:15 CT Abd/Pelvis - IV Contrast Only In Process Unspecified.                                EDMS

00:52 Steven Velazquez MD is Referral Physician.                                              [facility] 

00:55 Bed in low position. Call light in reach. Side rails up X2. Adult w/ patient. Pulse ox  as6 

      on. NIBP on.                                                                                

01:21 No provider procedures requiring assistance completed. IV discontinued, intact,         as6 

      bleeding controlled, No redness/swelling at site. Pressure dressing applied.                

                                                                                                  

Administered Medications:                                                                         

                                                                                             

23:58 Drug: NS 0.9% 1000 ml Route: IV; Rate: 1 bolus; Site: right forearm;                    as6 

                                                                                             

01:00 Follow up: Response: No adverse reaction; IV Status: Completed infusion; IV Intake:     as6 

      1000ml                                                                                      

                                                                                             

23:58 Not Given (Patient Refused): morphine 4 mg IVP once; RASS on ADMIN: Combtv4, Very       as6 

      Agttd3, Agttd2, Rstlss1, AlertClm0, Drwsy-1, Lt Sdtn-2, Mod Sdtn-3, Dp Sdtn-4,              

      UnArsble-5                                                                                  

23:58 Drug: Zofran (Ondansetron) 4 mg Route: IVP; Site: right forearm;                        as6 

                                                                                             

00:30 Follow up: Response: No adverse reaction                                                as6 

                                                                                                  

                                                                                                  

Intake:                                                                                           

01:00 IV: 1000ml; Total: 1000ml.                                                              as6 

                                                                                                  

Outcome:                                                                                          

00:53 Discharge ordered by MD.                                                                [facility] 

01:21 Discharged to home ambulatory, with family.                                             as6 

01:21 Condition: stable                                                                           

01:21 Discharge instructions given to patient, Instructed on discharge instructions, follow       

      up and referral plans. medication usage, Demonstrated understanding of instructions,        

      follow-up care, medications, Prescriptions given X 2.                                       

01:23 Patient left the ED.                                                                    as6 

                                                                                                  

Signatures:                                                                                       

Dispatcher [employer]                           EDMS                                                 

Cj Velasquez MD MD cha Dibbern, Lauren, RN                     RN   ld1                                                  

Cara Echeverria                                                  

Slawson, Naren, RN                      RN   as6                                                  

Dannielle Shay                                   lt3                                                  

                                                                                                  

Corrections: (The following items were deleted from the chart)                                    

                                                                                             

21:42 21:37 Acuity: BETTY 3 ld1                                                                 ld1 

                                                                                                  

**************************************************************************************************

## 2021-12-03 NOTE — RAD REPORT
EXAM DESCRIPTION:  CT - Abdomen   Pelvis W Contrast - 12/3/2021 6:38 am

 

CLINICAL HISTORY:  ABD PAIN.

 

COMPARISON:  None.

 

TECHNIQUE:  CT of the abdomen and pelvis was performed following intravenous administration of iodina
dieudonne contrast. Arterial phase images through the abdomen, and portal venous phase images through the a
bdomen and pelvis were obtained. Oral contrast was not administered. Axial, coronal, and sagittal sof
t tissue window reconstructions were created and sent to PACS.

This exam was performed according to our departmental dose-optimization program, which includes autom
ated exposure control, adjustment of the mA and/or kV according to patient size and/or use of iterati
ve reconstruction technique.

 

FINDINGS:  Thoracic: No significant abnormality.

Hepatobiliary: No concerning hepatic lesion identified. The portal veins are patent. The gallbladder 
is unremarkable. No biliary ductal dilatation.

Pancreas: Unremarkable.

Spleen: Unremarkable.

Gastrointestinal: No evidence of bowel obstruction or perienteric inflammation. The appendix is jihan
l.

Adrenals: No abnormality identified in either adrenal gland.

Renal: Tiny right renal hypodensity, too small to accurately characterize but statistically likely a 
cyst. No concerning parenchymal abnormality in either kidney. No hydronephrosis or urolithiasis.

Bladder/Reproductive: Unremarkable appearance of the urinary bladder by CT technique. Unremarkable CT
 appearance of the uterus and ovaries.

Vascular/Lymphatics: No lymphadenopathy identified by CT size criteria. Abdominal aorta is normal in 
caliber.

Musculoskeletal: No concerning osseous lesion identified. Transitional lumbosacral vertebral body, wi
th a pseudoarticulation on the right.

Fluid / peritoneum: No significant free fluid.   No free intraperitoneal air identified.

 

IMPRESSION  No acute abnormality identified in the abdomen or pelvis by CT.

 

Electronically signed by:   Francine Marti MD   12/3/2021 12:38 AM CST Workstation: [id number]

 

 

 

 

Due to temporary technical issues with the PACS/Fluency reporting system, reports are being signed by
 the in house radiologists without review as a courtesy to insure prompt reporting. The interpreting 
radiologist is fully responsible for the content of the report.

## 2021-12-15 ENCOUNTER — HOSPITAL ENCOUNTER (EMERGENCY)
Dept: HOSPITAL 97 - ER | Age: 21
Discharge: LEFT BEFORE DISCHARGE | End: 2021-12-15
Payer: COMMERCIAL

## 2021-12-15 VITALS — OXYGEN SATURATION: 100 % | DIASTOLIC BLOOD PRESSURE: 78 MMHG | TEMPERATURE: 97.2 F | SYSTOLIC BLOOD PRESSURE: 130 MMHG

## 2021-12-15 DIAGNOSIS — Z53.21: ICD-10-CM

## 2021-12-15 DIAGNOSIS — R42: Primary | ICD-10-CM

## 2021-12-15 PROCEDURE — 99281 EMR DPT VST MAYX REQ PHY/QHP: CPT

## 2021-12-15 NOTE — ER
Nurse's Notes                                                                                     

 [facility]                                                                 

Name: Rose Villar                                                                              

Age: 21 yrs                                                                                       

Sex: Female                                                                                       

: 2000                                                                                   

MRN: [id number]                                                                                   

Arrival Date: 12/15/2021                                                                          

Time: 18:23                                                                                       

Account#: [id number]                                                                            

Bed 20                                                                                            

Private MD:                                                                                       

Diagnosis:                                                                                        

                                                                                                  

Presentation:                                                                                     

12/15                                                                                             

18:37 Chief complaint: Patient states: Dizziness off/on since 15 y/o. Got worse after May 3rd ll1 

      after having her last baby. Severe HA when this happens. Coronavirus screen: Vaccine        

      status: Patient reports being unvaccinated. Client denies travel out of the [place] in the     

      last 14 days. At this time, the client does not indicate any symptoms associated with       

      coronavirus-19. Ebola Screen: Patient denies travel to an Ebola-affected area in the 21     

      days before illness onset. Initial Sepsis Screen: Does the patient meet any 2 criteria?     

      No. Patient's initial sepsis screen is negative. Does the patient have a suspected          

      source of infection? No. Patient's initial sepsis screen is negative. Risk Assessment:      

      Do you want to hurt yourself or someone else? Patient reports no desire to harm self or     

      others. Onset of symptoms was May 05, 2021.                                                 

18:37 Method Of Arrival: Ambulatory                                                           ll1 

18:37 Acuity: BETTY 3                                                                           ll1 

                                                                                                  

Historical:                                                                                       

- Allergies:                                                                                      

18:40 No Known Allergies;                                                                     ll1 

- PMHx:                                                                                           

18:40 Hypertensive disorder;                                                                  ll1 

- PSHx:                                                                                           

18:40  section;                                                                       ll1 

                                                                                                  

- Immunization history:: Client reports having NOT received the Covid vaccine. Flu                

  vaccine is not up to date.                                                                      

- Social history:: Smoking status: Patient reports the use of cigarette tobacco                   

  products, denies chronic smoking, but will smoke occasionally.                                  

                                                                                                  

                                                                                                  

Assessment:                                                                                       

20:37 Reassessment: Called from [facility]. No response.                                           5 

                                                                                                  

Vital Signs:                                                                                      

18:37  / 78; Pulse 84; Resp 16; Temp 97.2; Pulse Ox 100% ; Weight 85.28 kg; Height 5    ll1 

      ft. 1 in. (154.94 cm); Pain 4/10;                                                           

18:37 Body Mass Index 35.52 (85.28 kg, 154.94 cm)                                             ll1 

                                                                                                  

ED Course:                                                                                        

18:23 Patient arrived in ED.                                                                  as  

18:40 Triage completed.                                                                       ll1 

18:41 Arm band placed on.                                                                     ll1 

20:33 Shelby, Setul, MD is Attending Physician.                                                sp3 

                                                                                                  

Administered Medications:                                                                         

No medications were administered                                                                  

                                                                                                  

                                                                                                  

Outcome:                                                                                          

20:37 Patient left the ED.                                                                    jh5 

                                                                                                  

Signatures:                                                                                       

Vicki Mcghee Lynsay, RN                       RN   ll1                                                  

Martir Shelby MD MD   sp3                                                  

Cara Britton RN                       RN   jh5                                                  

                                                                                                  

**************************************************************************************************

## 2021-12-15 NOTE — XMS REPORT
Continuity of Care Document

                          Created on:December 15, 2021



Patient:JA KOHLI

Sex:Female

:2000

External Reference #:[id number]





Demographics







                          Address                   [address]



                                                    [place] [address]

 

                          Home Phone                [phone]

 

                          Work Phone                [phone]

 

                          Mobile Phone              [phone]

 

                          Email Address             NONE

 

                          Preferred Language        English

 

                          Marital Status            Unknown

 

                          Denominational Affiliation     Unknown

 

                          Race                      Unknown

 

                          Additional Race(s)        Unavailable



                                                    Unavailable

 

                          Ethnic Group              Unknown









Author







                          Organization              [facility]

t

 

                          Address                   [address]



                                                    [place] [address]

 

                          Phone                     [phone]









Support







                Name            Relationship    Address         Phone

 

                MUKESH MOSS               Unavailable     [phone]

 

                [address]     [phone]



                                                [place] [address] 

 

                JUANA KOHLI               Unavailable     [phone]









Care Team Providers







                    Name                Role                Phone

 

                    UNKNOWN, REFFERING  Primary Care Physician Unavailable

 

                    DONNA RENNER  Attending Clinician Unavailable

 

                    Kizzy HWANGP          Attending Clinician [phone]

 

                    Provider,  [facility] Attending Clinician Unavailable

 

                    Visit,  Nurse       Attending Clinician Unavailable

 

                    Jud AVILA,  R      Attending Clinician [phone]

 

                    Doctor Unassigned,  Name Attending Clinician Unavailable

 

                    Amadou LARA,  T       Attending Clinician Unavailable

 

                    Only,  Db Test      Attending Clinician Unavailable

 

                    Subhash FNP           Attending Clinician [phone]

 

                    Akinsipe WHCNP,  C  Attending Clinician [phone]

 

                    AKINSIPE,  C        Attending Clinician Unavailable

 

                    Ty ELAINE        Attending Clinician [phone]

 

                    Homer ELAINE         Attending Clinician [phone]

 

                    Naren ELAINE,  M      Attending Clinician [phone]

 

                    Arturo LARA,  A        Attending Clinician Unavailable

 

                    Singer HAIRSTON           Attending Clinician [phone]

 

                    Ultrasound          Attending Clinician Unavailable

 

                    Felipe ELAINE,  F         Attending Clinician [phone]

 

                    Bianka HWANGP,  F    Attending Clinician [phone]

 

                    RASSOLI             Attending Clinician Unavailable

 

                    RASSOLI             Admitting Clinician Unavailable









Payers







           Payer Name Policy Type Policy Number Effective Date Expiration Date S

vinita VALLES            [id number]  2020-10-01            



           HEALTH                           00:00:00              

 

           [place]ICAID [place]            [id number]  2020            



                                            00:00:00              







Problems







       Condition Condition Condition Status Onset  Resolution Last   Treating Co

mments 

Source



       Name   Details Category        Date   Date   Treatment Clinician        



                                                 Date                 

 

       Routine Routine Disease Active                              [facility]



       postpartum postpartum               5-24                               it

y of



       [place]               [place]                             [place]

[place]



                                                                    [facility]

 

       S/P    S/P    Disease Active                              Univers



                       -06                               ity of



       section section               00:00:                             [place]



                                   00                                 [facility]

 

       Chorioamni Chorioamni Disease Active                              U

nivers



       onitis onitis                                              ity of



                                   00:00:                             [place]



                                   00                                 [facility]

 

       Pre-eclamp Pre-eclamp Disease Active                              U

nivers



       elie in elie in                                              ity of



       third  third                00:00:                             [place]



       trimester trimester               00                                 [facility]

 

       Obesity Obesity Disease Active                              Univers



       (BMI   (BMI                                                ity of



       30-39.9) 30-39.9)               00:00:                             [place]



                                   00                                 [facility]

 

       40 weeks 40 weeks Disease Active                              Unive

rs



       gestation gestation               -02                               ity 

of



       of     of                   00:00:                             [place]



       pregnancy pregnancy               00                                 [facility]

 

       Rubella Rubella Disease Active 2020                      Overview: Univ

ers



       non-immune non-immune               0-06                        Formattin

 ity of



       status, status,               00:00:                      g of this [place]



       antepartum antepartum               00                          note   Me

dical



                                                               might be Branch



                                                               different 



                                                               from the 



                                                               original. 



                                                               Address 



                                                               in PP. 

 

       Maternal Maternal Disease Active 2020                      Overview: Un

thierno



       varicella, varicella,               0-06                        Formattin

 ity of



       non-immune non-immune               00:00:                      g of this

 [place]



                                   00                          note   Medical



                                                               might be Branch



                                                               different 



                                                               from the 



                                                               original. 



                                                               Address 



                                                               in PP. 

 

       Supervisio Supervisio Disease Active 2020                             U

nivers



       n of high n of high               0-06                               ity 

of



       risk   risk                 00:00:                             [place]



       pregnancy pregnancy               00                                 Medi

lo



       in second in second                                                  Bran

ch



       trimester trimester                                                  

 

       Obesity in Obesity in Disease Active -                             U

nivers



       pregnancy pregnancy               0-06                               ity 

of



                                   00:00:                             [place]



                                   00                                 [facility]

 

       Primigravi Primigravi Disease Active 2020                             U

nivers



       da in  da in                0-06                               ity of



       second second               00:00:                             [place]



       trimester trimester               00                                 [facility]

 

       Nausea and Nausea and Disease Active 2020-1                             U

nivers



       vomiting vomiting               0-06                               ity of



       during during               00:00:                             [place]



       pregnancy pregnancy               00                                 Medi

lo



       prior to prior to                                                  Branch



       22 weeks 22 weeks                                                  



       gestation gestation                                                  

 

       No known No known Disease                                           Unive

rs



       active active                                                  ity of



       problems problems                                                  [facility]







Allergies, Adverse Reactions, Alerts







       Allergy Allergy Status Severity Reaction(s) Onset  Inactive Treating Comm

ents 

Source



       Name   Type                        Date   Date   Clinician        

 

       NO KNOWN Drug   Active                                           Univers



       ALLERGIE Class                                                   ity of



       S                                                              [facility]







Social History







           Social Habit Start Date Stop Date  Quantity   Comments   Source

 

           ASSERTION  2020            Pregnant              University of



                      00:00:00                                    [facility]

 

           Exposure to                       Not sure              University of



           SARS-CoV-2                                             [place] [facility]



           (event)                                                Branch

 

           History [facility] o

f



           Alcohol Comment                                             [place] [facility]

ical



                                                                  Branch

 

           Alcohol intake 2021 Lifetime              University

 of



                      00:00:00   00:00:00   non-drinker            [facility]



                                            (finding)             Branch

 

           Tobacco use and 2021 Never used            Universit

y of



           exposure   00:00:00   00:00:00                         [place] [facility]



                                                                  Branch

 

           History SDOH 2020-10-05 2020-10-05 1                     University o

f



           Alcohol Frequency 00:00:00   00:00:00                         [place] M

edical



                                                                  Branch

 

           History SDOH 2020-10-05 2020-10-05 99                    University o

f



           Alcohol Std 00:00:00   00:00:00                         [place] [facility]



           Drinks                                                 Branch

 

           History SDOH 2020-10-05 2020-10-05 1                     University o

f



           Alcohol Binge 00:00:00   00:00:00                         [place] [facility]

al



                                                                  Branch

 

           Tobacco Comment 2020-10-05 2020-10-05 2-3 cigarretes            Unive

rsity of



                      00:00:00   00:00:00   per day               [facility]

 

           Sex Assigned At 2000                       Universit

y of



           Birth      00:00:00   00:00:00                         [facility]









                Smoking Status  Start Date      Stop Date       Source

 

                Current every day smoker 2021 00:00:00                 Uni

versity of [facility]

 

                Current some day smoker 2021 00:00:00                 Univ

ersity [facility]

 

                Unknown if ever smoked                                 Universit

y of [facility]







Medications







       Ordered Filled Start  Stop   Current Ordering Indication Dosage Frequency

 Signature

                    Comments            Components          Source



     Medication Medication Date Date Medication? Clinician                (SIG) 

          



     Name Name                                                   

 

     HYDROCODONE      2021      Yes                      Take  by           Un

thierno



     -ACETAMINOP      2-05                               mouth.           ity of



     HEN ORAL      14:11:                                              [address]



                                                                 Branch

 

     HYDROCODONE      2021      Yes                      Take  by           Un

thierno



     -ACETAMINOP      2-05                               mouth.           ity of



     HEN ORAL      14:11:                                              [address]



                                                                 Branch

 

     HYDROCODONE      2021      Yes                      Take  by           Un

thierno



     -ACETAMINOP      2-05                               mouth.           ity of



     HEN ORAL      14:11:                                              [address]



                                                                 Branch

 

     fluticasone      2021      Yes       [id number] 1{spray      Use 1        

   Univers



     propionate      2-05                     }         Spray in           ity o

f



     50        00:00:                               each           [place]



     mcg/actuati      00                                 nostril           Medic

al



     on nasal                                         daily.           Branch



     spray                                                        

 

     fluticasone      2021      Yes       [id number] 1{spray      Use 1        

   Univers



     propionate      2-05                     }         Spray in           ity o

f



     50        00:00:                               each           [place]



     mcg/actuati      00                                 nostril           Medic

al



     on nasal                                         daily.           Branch



     spray                                                        

 

     fluticasone      2021      Yes       [id number] 1{spray      Use 1        

   Univers



     propionate      2-05                     }         Spray in           ity o

f



     50        00:00:                               each           [place]



     mcg/actuati      00                                 nostril           Medic

al



     on nasal                                         daily.           Branch



     spray                                                        

 

     amoxicillin      2021- Yes       [id number] 500mg      Take 1         

  Univers



     500 mg      2-05 12-13                          capsule by           ity of



     capsule      00:00: 05:59                          mouth 3           [place]



               00   :00                           (three)           Medical



                                                  times           Branch



                                                  daily for           



                                                  7 days.           

 

     amoxicillin      2021- Yes       [id number] 500mg      Take 1         

  Univers



     500 mg      2-05 12-13                          capsule by           ity of



     capsule      00:00: 05:59                          mouth 3           [place]



               00   :00                           (three)           Medical



                                                  times           Branch



                                                  daily for           



                                                  7 days.           

 

     amoxicillin      2021- Yes       [id number] 500mg      Take 1         

  Univers



     500 mg      2-05 12-13                          capsule by           ity of



     capsule      00:00: 05:59                          mouth 3           [place]



               00   :00                           (three)           Medical



                                                  times           Branch



                                                  daily for           



                                                  7 days.           

 

     medroxyPROG      2021- No        [id number] 150mg                    

 Univers



     ESTERone      0-18 10-18                                         ity of



     (DEPO-PROVE      20:28: 20:29                                         [place]



     )       00   :00                                          Medical



     injection                                                        Branch



     150 mg                                                        

 

     medroxyPROG      2021- No        [id number] 150mg      150 mg,       

    Univers



     ESTERone      0-18 10-18                          Intramuscu           ity 

of



     (DEPO-PROVE      20:28: 20:29                          lar, ONCE,          

 [place]



     RA)       00   :00                           1 dose, On           Medical



     injection                                         Mon            Branch



     150 mg                                         10/18/21           



                                                  at 1530,           



                                                  Routine           

 

     medroxyPROG      2021- No        [id number] 150mg                    

 Univers



     ESTERone                                               ity of



     (DEPO-PROVE      22:30: 21:22                                         [place]



     RA)       00   :00                                          Medical



     injection                                                        Branch



     150 mg                                                        

 

     medroxyPROG      2021- No        [id number] 150mg      150 mg,       

    Univers



     ESTERone                                Intramuscu           ity 

of



     (DEPO-PROVE      22:30: 21:22                          lar, ONCE,          

 [place]



     RA)       00   :00                           1 dose,           Medical



     injection                                         Mon            Branch



     150 mg                                         21 at           



                                                  1730,           



                                                  Routine           

 

     HYDROCODONE            Yes                      Take  by           Un

thierno



     -ACETAMINOP      6-14                               mouth.           ity of



     HEN ORAL      20:47:                                              [address]

 

     HYDROCODONE            Yes                      Take  by           Un

thierno



     -ACETAMINOP      6-14                               mouth.           ity of



     HEN ORAL      20:47:                                              [address]

 

     HYDROCODONE            Yes                      Take  by           Un

thierno



     -ACETAMINOP      6-14                               mouth.           ity of



     HEN ORAL      20:47:                                              [address]

 

     HYDROCODONE            Yes                      Take  by           Un

thierno



     -ACETAMINOP      6-14                               mouth.           ity of



     HEN ORAL      20:47:                                              [address]

 

     HYDROCODONE      0      Yes                      Take  by           Un

thierno



     -ACETAMINOP      6-14                               mouth.           ity of



     HEN ORAL      20:47:                                              [address]

 

     HYDROCODONE            Yes                      Take  by           Un

thierno



     -ACETAMINOP      6-14                               mouth.           ity of



     HEN ORAL      20:47:                                              [address]

 

     HYDROCODONE      0      Yes                      Take  by           Un

thierno



     -ACETAMINOP      6-14                               mouth.           ity of



     HEN ORAL      20:47:                                              [address]

 

     HYDROCODONE      0      Yes                      Take  by           Un

thierno



     -ACETAMINOP      6-14                               mouth.           ity of



     HEN ORAL      20:47:                                              [address]

 

     HYDROCODONE      0      Yes                      Take  by           Un

thierno



     -ACETAMINOP      6-14                               mouth.           ity of



     HEN ORAL      20:47:                                              [address]

 

     HYDROCODONE      0      Yes                      Take  by           Un

thierno



     -ACETAMINOP      6-14                               mouth.           ity of



     HEN ORAL      20:47:                                              [address]

 

     HYDROCODONE            Yes                      Take  by           Un

thierno



     -ACETAMINOP      6-14                               mouth.           ity of



     HEN ORAL      15:47:                                              [place]



               27                                                Medical



                                                                 Branch

 

     HYDROCODONE            Yes                      Take  by           Un

thierno



     -ACETAMINOP      6-14                               mouth.           ity of



     HEN ORAL      15:47:                                              [place]



               27                                                Medical



                                                                 Branch

 

     HYDROCODONE            Yes                      Take  by           Un

thierno



     -ACETAMINOP      6-14                               mouth.           ity of



     HEN ORAL      15:47:                                              [place]



                                                               Medical



                                                                 Branch

 

     HYDROCODONE            Yes                      Take  by           Un

thierno



     -ACETAMINOP      6-14                               mouth.           ity of



     HEN ORAL      15:47:                                              [place]



                                                               Medical



                                                                 Branch

 

     prenatal            Yes       [id number] 1{tbl}      Take 1           

[facility]



     vitamin      5-05                               tablet by           [facility]



     w/FA tablet      00:00:                               mouth           [place]



               00                                 daily.           [facility]



                                                                 [facility]

 

     docusate            Yes       [id number] 240mg      Take 1           [facility]

[facility]



     calcium 240      5-05                               capsule by           it

y of



     mg capsule      00:00:                               mouth once           [facility]



               00                                 daily as           [facility]



                                                  needed for           [facility]



                                                  Constipati           



                                                  on.            

 

     ferrous            Yes       [id number] 325mg      Take 1           [facility]

[facility]



     sulfate 325      5-05                               tablet by           [facility]

 of



     mg (65 mg      00:00:                               mouth 2           [place]



     iron)      00                                 (two)           Medical



     tablet                                         times           [facility]



                                                  daily.           

 

     prenatal            Yes       [id number] 1{tbl}      Take 1           

[facility]



     vitamin      5-05                               tablet by           [facility]



     w/FA tablet      00:00:                               mouth           [place]



               00                                 daily.           Medical



                                                                 [facility]

 

     docusate            Yes       [id number] 240mg      Take 1           [facility]

[facility]



     calcium 240      5-05                               capsule by           it

y of



     mg capsule      00:00:                               mouth once           [facility]

[facility]



               00                                 daily as           [facility]



                                                  needed for           [facility]



                                                  Constipati           



                                                  on.            

 

     ferrous            Yes       [id number] 325mg      Take 1           [facility]

[facility]



     sulfate 325      5-05                               tablet by           [facility]

 of



     mg (65 mg      00:00:                               mouth 2           [place]



     iron)      00                                 (two)           Medical



     tablet                                         times           [facility]



                                                  daily.           

 

     prenatal            Yes       [id number] 1{tbl}      Take 1           

[facility]



     vitamin      5-05                               tablet by           [facility]



     w/FA tablet      00:00:                               mouth           [place]



               00                                 daily.           Medical



                                                                 [facility]

 

     docusate            Yes       [id number] 240mg      Take 1           [facility]

[facility]



     calcium 240      5-05                               capsule by           it

y [facility]



     mg capsule      00:00:                               mouth once           [facility]



               00                                 daily as           [facility]



                                                  needed for           [facility]



                                                  Constipati           



                                                  on.            

 

     ferrous            Yes       [id number] 325mg      Take 1           [facility]

[facility]



     sulfate 325      5-05                               tablet by           [facility]

 of



     mg (65 mg      00:00:                               mouth 2           [place]



     iron)      00                                 (two)           Medical



     tablet                                         times           [facility]



                                                  daily.           

 

     prenatal            Yes       [id number] 1{tbl}      Take 1           

[facility]



     vitamin      5-05                               tablet by           [facility]



     w/FA tablet      00:00:                               mouth           [place]



               00                                 daily.           Medical



                                                                 [facility]

 

     docusate            Yes       [id number] 240mg      Take 1           [facility]

[facility]



     calcium 240      5-05                               capsule by           it

y [facility]



     mg capsule      00:00:                               mouth once           [facility]



               00                                 daily as           [facility]



                                                  needed for           [facility]



                                                  Constipati           



                                                  on.            

 

     ferrous            Yes       [id number] 325mg      Take 1           [facility]

[facility]



     sulfate 325      5-05                               tablet by           [facility]

 of



     mg (65 mg      00:00:                               mouth 2           [place]



     iron)      00                                 (two)           Medical



     tablet                                         times           [facility]



                                                  daily.           

 

     prenatal            Yes       [id number] 1{tbl}      Take 1           

[facility]



     vitamin      5-05                               tablet by           [facility]



     w/FA tablet      00:00:                               mouth           [place]



               00                                 daily.           Medical



                                                                 [facility]

 

     docusate            Yes       [id number] 240mg      Take 1           [facility]

[facility]



     calcium 240      5-05                               capsule by           it

y of



     mg capsule      00:00:                               mouth once           [facility]

[place]



               00                                 daily as           [facility]



                                                  needed for           [facility]



                                                  Constipati           



                                                  on.            

 

     ferrous            Yes       [id number] 325mg      Take 1           [facility]

[facility]



     sulfate 325      5-05                               tablet by           [facility]

 of



     mg (65 mg      00:00:                               mouth 2           [place]



     iron)      00                                 (two)           Medical



     tablet                                         times           [facility]



                                                  daily.           

 

     prenatal            Yes       [id number] 1{tbl}      Take 1           

[facility]



     vitamin      5-05                               tablet by           [facility]



     w/FA tablet      00:00:                               mouth           [place]



               00                                 daily.           Medical



                                                                 [facility]

 

     docusate            Yes       [id number] 240mg      Take 1           [facility]

[facility]



     calcium 240      5-05                               capsule by           it

y of



     mg capsule      00:00:                               mouth once           [facility]

[facility]



               00                                 daily as           [facility]



                                                  needed for           [facility]



                                                  Constipati           



                                                  on.            

 

     ferrous            Yes       [id number] 325mg      Take 1           [facility]

[facility]



     sulfate 325      5-05                               tablet by           [facility]

 of



     mg (65 mg      00:00:                               mouth 2           [place]



     iron)      00                                 (two)           Medical



     tablet                                         times           [facility]



                                                  daily.           

 

     prenatal            Yes       [id number] 1{tbl}      Take 1           

[facility]



     vitamin      5-05                               tablet by           [facility]



     w/FA tablet      00:00:                               mouth           [place]



               00                                 daily.           Medical



                                                                 [facility]

 

     docusate            Yes       [id number] 240mg      Take 1           [facility]

[facility]



     calcium 240      5-05                               capsule by           it

y of



     mg capsule      00:00:                               mouth once           [facility]



               00                                 daily as           [facility]



                                                  needed for           [facility]



                                                  Constipati           



                                                  on.            

 

     ferrous            Yes       [id number] 325mg      Take 1           [facility]

[facility]



     sulfate 325      5-05                               tablet by           [facility]

 of



     mg (65 mg      00:00:                               mouth 2           [place]



     iron)      00                                 (two)           Medical



     tablet                                         times           [facility]



                                                  daily.           

 

     prenatal            Yes       [id number] 1{tbl}      Take 1           

[facility]



     vitamin      5-05                               tablet by           [facility]



     w/FA tablet      00:00:                               mouth           [place]



               00                                 daily.           Medical



                                                                 [facility]

 

     docusate            Yes       [id number] 240mg      Take 1           [facility]

[facility]



     calcium 240      5-05                               capsule by           it

y of



     mg capsule      00:00:                               mouth once           [facility]

[place]



               00                                 daily as           [facility]



                                                  needed for           [facility]



                                                  Constipati           



                                                  on.            

 

     ferrous            Yes       [id number] 325mg      Take 1           [facility]

[facility]



     sulfate 325      5-05                               tablet by           [facility]

 of



     mg (65 mg      00:00:                               mouth 2           [place]



     iron)      00                                 (two)           Medical



     tablet                                         times           [facility]



                                                  daily.           

 

     prenatal            Yes       [id number] 1{tbl}      Take 1           

[facility]



     vitamin      5-05                               tablet by           [facility]



     w/FA tablet      00:00:                               mouth           [place]



               00                                 daily.           Medical



                                                                 [facility]

 

     docusate            Yes       [id number] 240mg      Take 1           [facility]

[facility]



     calcium 240      5-05                               capsule by           it

y of



     mg capsule      00:00:                               mouth once           [facility]

[place]



               00                                 daily as           [facility]



                                                  needed for           [facility]



                                                  Constipati           



                                                  on.            

 

     ferrous            Yes       [id number] 325mg      Take 1           [facility]

[facility]



     sulfate 325      5-05                               tablet by           [facility]

 of



     mg (65 mg      00:00:                               mouth 2           [place]



     iron)      00                                 (two)           Medical



     tablet                                         times           [facility]



                                                  daily.           

 

     prenatal            Yes       [id number] 1{tbl}      Take 1           

[facility]



     vitamin      5-05                               tablet by           [facility]



     w/FA tablet      00:00:                               mouth           [place]



               00                                 daily.           Medical



                                                                 [facility]

 

     docusate            Yes       [id number] 240mg      Take 1           [facility]

[facility]



     calcium 240      5-05                               capsule by           it

y of



     mg capsule      00:00:                               mouth once           [facility]



               00                                 daily as           [facility]



                                                  needed for           [facility]



                                                  Constipati           



                                                  on.            

 

     ferrous            Yes       [id number] 325mg      Take 1           [facility]

[facility]



     sulfate 325      5-05                               tablet by           [facility]

 of



     mg (65 mg      00:00:                               mouth 2           [place]



     iron)      00                                 (two)           Medical



     tablet                                         times           [facility]



                                                  daily.           

 

     prenatal            Yes       [id number] 1{tbl}      Take 1           

[facility]



     vitamin      5-05                               tablet by           [facility]



     w/FA tablet      00:00:                               mouth           [place]



               00                                 daily.           Medical



                                                                 [facility]

 

     docusate            Yes       [id number] 240mg      Take 1           [facility]

[facility]



     calcium 240      5-05                               capsule by           it

y of



     mg capsule      00:00:                               mouth once           [facility]

[facility]



               00                                 daily as           [facility]



                                                  needed for           [facility]



                                                  Constipati           



                                                  on.            

 

     ferrous            Yes       [id number] 325mg      Take 1           [facility]

[facility]



     sulfate 325      5-05                               tablet by           [facility]

 of



     mg (65 mg      00:00:                               mouth 2           [place]



     iron)      00                                 (two)           Medical



     tablet                                         times           [facility]



                                                  daily.           

 

     prenatal            Yes       [id number] 1{tbl}      Take 1           

[facility]



     vitamin      5-05                               tablet by           [facility]



     w/FA tablet      00:00:                               mouth           [place]



               00                                 daily.           Medical



                                                                 [facility]

 

     docusate            Yes       [id number] 240mg      Take 1           [facility]

[facility]



     calcium 240      5-05                               capsule by           it

y of



     mg capsule      00:00:                               mouth once           [facility]

[facility]



               00                                 daily as           [facility]



                                                  needed for           [facility]



                                                  Constipati           



                                                  on.            

 

     ferrous            Yes       [id number] 325mg      Take 1           [facility]

[facility]



     sulfate 325      5-05                               tablet by           [facility]

 of



     mg (65 mg      00:00:                               mouth 2           [place]



     iron)      00                                 (two)           Medical



     tablet                                         times           [facility]



                                                  daily.           

 

     prenatal            Yes       [id number] 1{tbl}      Take 1           

[facility]



     vitamin      5-05                               tablet by           [facility]



     w/FA tablet      00:00:                               mouth           [place]



               00                                 daily.           Medical



                                                                 [facility]

 

     docusate            Yes       [id number] 240mg      Take 1           [facility]

[facility]



     calcium 240      5-05                               capsule by           it

y of



     mg capsule      00:00:                               mouth once           [facility]

[facility]



               00                                 daily as           [facility]



                                                  needed for           [facility]



                                                  Constipati           



                                                  on.            

 

     ferrous            Yes       [id number] 325mg      Take 1           [facility]

[facility]



     sulfate 325      5-05                               tablet by           [facility]

 of



     mg (65 mg      00:00:                               mouth 2           [place]



     iron)      00                                 (two)           Medical



     tablet                                         times           [facility]



                                                  daily.           

 

     prenatal            Yes       [id number] 1{tbl}      Take 1           

[facility]



     vitamin      5-05                               tablet by           [facility]



     w/FA tablet      00:00:                               mouth           [place]



               00                                 daily.           Medical



                                                                 [facility]

 

     docusate            Yes       [id number] 240mg      Take 1           [facility]

[facility]



     calcium 240      5-05                               capsule by           it

y of



     mg capsule      00:00:                               mouth once           [facility]

[facility]



               00                                 daily as           [facility]



                                                  needed for           [facility]



                                                  Constipati           



                                                  on.            

 

     ferrous            Yes       [id number] 325mg      Take 1           [facility]

[facility]



     sulfate 325      5-05                               tablet by           [facility]

 of



     mg (65 mg      00:00:                               mouth 2           [place]



     iron)      00                                 (two)           [facility]



     tablet                                         times           [facility]



                                                  daily.           

 

     prenatal            Yes       [id number] 1{tbl}      Take 1           

[facility]



     vitamin      5-05                               tablet by           [facility]



     w/FA tablet      00:00:                               mouth           [place]



               00                                 daily.           Medical



                                                                 [facility]

 

     docusate            Yes       [id number] 240mg      Take 1           [facility]

[facility]



     calcium 240      5-05                               capsule by           it

y of



     mg capsule      00:00:                               mouth once           [facility]

[facility]



               00                                 daily as           [facility]



                                                  needed for           [facility]



                                                  Constipati           



                                                  on.            

 

     ferrous            Yes       [id number] 325mg      Take 1           [facility]

[facility]



     sulfate 325      5-05                               tablet by           [facility]

 of



     mg (65 mg      00:00:                               mouth 2           [place]



     iron)      00                                 (two)           Medical



     tablet                                         times           [facility]



                                                  daily.           

 

     prenatal            Yes       [id number] 1{tbl}      Take 1           

[facility]



     vitamin      5-05                               tablet by           [facility]



     w/FA tablet      00:00:                               mouth           [place]



               00                                 daily.           Medical



                                                                 [facility]

 

     docusate            Yes       [id number] 240mg      Take 1           [facility]

[facility]



     calcium 240      5-05                               capsule by           it

y of



     mg capsule      00:00:                               mouth once           [facility]



               00                                 daily as           [facility]



                                                  needed for           [facility]



                                                  Constipati           



                                                  on.            

 

     ferrous            Yes       [id number] 325mg      Take 1           [facility]

[facility]



     sulfate 325      5-05                               tablet by           [facility]

 of



     mg (65 mg      00:00:                               mouth 2           [place]



     iron)      00                                 (two)           [facility]



     tablet                                         times           [facility]



                                                  daily.           

 

     prenatal            Yes       [id number] 1{tbl}      Take 1           

[facility]



     vitamin      5-05                               tablet by           [facility]



     w/FA tablet      00:00:                               mouth           [place]



               00                                 daily.           Medical



                                                                 [facility]

 

     docusate            Yes       [id number] 240mg      Take 1           [facility]

[facility]



     calcium 240      5-05                               capsule by           it

y of



     mg capsule      00:00:                               mouth once           [facility]



               00                                 daily as           [facility]



                                                  needed for           [facility]



                                                  Constipati           



                                                  on.            

 

     ferrous            Yes       [id number] 325mg      Take 1           [facility]

[facility]



     sulfate 325      5-05                               tablet by           [facility]

 of



     mg (65 mg      00:00:                               mouth 2           [place]



     iron)      00                                 (two)           [facility]



     tablet                                         times           [facility]



                                                  daily.           

 

     prenatal      -      Yes       [id number] 1{tbl}      Take 1           

[facility]



     vitamin      5-05                               tablet by           [facility]



     w/FA tablet      00:00:                               mouth           [place]



               00                                 daily.           [facility]



                                                                 [facility]

 

     docusate            Yes       [id number] 240mg      Take 1           [facility]

[facility]



     calcium 240      5-05                               capsule by           it

y [facility]



     mg capsule      00:00:                               mouth once           [facility]

[facility]



               00                                 daily as           [facility]



                                                  needed for           [facility]



                                                  Constipati           



                                                  on.            

 

     ferrous            Yes       [id number] 325mg      Take 1           [facility]

[facility]



     sulfate 325      5-05                               tablet by           [facility]

 of



     mg (65 mg      00:00:                               mouth 2           [place]



     iron)      00                                 (two)           [facility]



     tablet                                         times           [facility]



                                                  daily.           

 

     prenatal            Yes       [id number] 1{tbl}      Take 1           

[facility]



     vitamin      5-05                               tablet by           [facility]



     w/FA tablet      00:00:                               mouth           [place]



               00                                 daily.           [facility]



                                                                 [facility]

 

     docusate            Yes       [id number] 240mg      Take 1           [facility]

[facility]



     calcium 240      5-05                               capsule by           it

y [facility]



     mg capsule      00:00:                               mouth once           [facility]

[facility]



               00                                 daily as           [facility]



                                                  needed for           [facility]



                                                  Constipati           



                                                  on.            

 

     ferrous            Yes       [id number] 325mg      Take 1           [facility]

[facility]



     sulfate 325      5-05                               tablet by           [facility]

 of



     mg (65 mg      00:00:                               mouth 2           [place]



     iron)      00                                 (two)           [facility]



     tablet                                         times           [facility]



                                                  daily.           

 

     prenatal            Yes       [id number] 1{tbl}      Take 1           

[facility]



     vitamin      5-05                               tablet by           [facility]



     w/FA tablet      00:00:                               mouth           [place]



               00                                 daily.           [facility]



                                                                 [facility]

 

     docusate            Yes       [id number] 240mg      Take 1           [facility]

[facility]



     calcium 240      5-05                               capsule by           it

y [facility]



     mg capsule      00:00:                               mouth once           [facility]

[facility]



               00                                 daily as           [facility]



                                                  needed for           [facility]



                                                  Constipati           



                                                  on.            

 

     ferrous            Yes       [id number] 325mg      Take 1           [facility]

[facility]



     sulfate 325      5-05                               tablet by           [facility]

 of



     mg (65 mg      00:00:                               mouth 2           [place]



     iron)      00                                 (two)           [facility]



     tablet                                         times           [facility]



                                                  daily.           

 

     acetaminoph      2022- No        [id number] 650mg      Take 2        

   [facility]



     en        5-05 05-06                          tablets by           [facility]



     (TYLENOL)      00:00: 04:59                          mouth           [place]



     325 mg      00   :00                           every 6           [facility]



     tablet                                         (six)           [facility]



                                                  hours as           



                                                  needed for           



                                                  Pain           



                                                  (scale           



                                                  1-3).           

 

     acetaminoph      2022- No        [id number] 650mg      Take 2        

   [facility]



     en        5-05 05-06                          tablets by           [facility]



     (TYLENOL)      00:00: 04:59                          mouth           [place]



     325 mg      00   :00                           every 6           [facility]



     tablet                                         (six)           [facility]



                                                  hours as           



                                                  needed for           



                                                  Pain           



                                                  (scale           



                                                  1-3).           

 

     acetaminoph      2022- No        [id number] 650mg      Take 2        

   [facility]



     en        5-05 05-06                          tablets by           [facility]



     (TYLENOL)      00:00: 04:59                          mouth           [place]



     325 mg      00   :00                           every 6           [facility]



     tablet                                         (six)           [facility]



                                                  hours as           



                                                  needed for           



                                                  Pain           



                                                  (scale           



                                                  1-3).           

 

     acetaminoph      2022- No        [id number] 650mg      Take 2        

   [facility]



     en        5-05 05-06                          tablets by           [facility]



     (TYLENOL)      00:00: 04:59                          mouth           [place]



     325 mg      00   :00                           every 6           [facility]



     tablet                                         (six)           [facility]



                                                  hours as           



                                                  needed for           



                                                  Pain           



                                                  (scale           



                                                  1-3).           

 

     acetaminoph      2022- No        [id number] 650mg      Take 2        

   [facility]



     en        5-05 05-06                          tablets by           [facility]



     (TYLENOL)      00:00: 04:59                          mouth           [place]



     325 mg      00   :00                           every 6           [facility]



     tablet                                         (six)           [facility]



                                                  hours as           



                                                  needed for           



                                                  Pain           



                                                  (scale           



                                                  1-3).           

 

     acetaminoph      2022- No        [id number] 650mg      Take 2        

   [facility]



     en        5-05 05-06                          tablets by           [facility]



     (TYLENOL)      00:00: 04:59                          mouth           [place]



     325 mg      00   :00                           every 6           [facility]



     tablet                                         (six)           [facility]



                                                  hours as           



                                                  needed for           



                                                  Pain           



                                                  (scale           



                                                  1-3).           

 

     acetaminoph      2022- No        [id number] 650mg      Take 2        

   [facility]



     en        5-05 05-06                          tablets by           [facility]



     (TYLENOL)      00:00: 04:59                          mouth           [place]



     325 mg      00   :00                           every 6           [facility]



     tablet                                         (six)           [facility]



                                                  hours as           



                                                  needed for           



                                                  Pain           



                                                  (scale           



                                                  1-3).           

 

     acetaminoph      2022- No        [id number] 650mg      Take 2        

   [facility]



     en        5-05 05-06                          tablets by           [facility]



     (TYLENOL)      00:00: 04:59                          mouth           [place]



     325 mg      00   :00                           every 6           [facility]



     tablet                                         (six)           [facility]



                                                  hours as           



                                                  needed for           



                                                  Pain           



                                                  (scale           



                                                  1-3).           

 

     acetaminoph      2022- No        [id number] 650mg      Take 2        

   [facility]



     en        5-05 05-06                          tablets by           [facility]



     (TYLENOL)      00:00: 04:59                          mouth           [place]



     325 mg      00   :00                           every 6           [facility]



     tablet                                         (six)           [facility]



                                                  hours as           



                                                  needed for           



                                                  Pain           



                                                  (scale           



                                                  1-3).           

 

     acetaminoph      2022- No        [id number] 650mg      Take 2        

   [facility]



     en        5-05 05-06                          tablets by           [facility]



     (TYLENOL)      00:00: 04:59                          mouth           [place]



     325 mg      00   :00                           every 6           [facility]



     tablet                                         (six)           [facility]



                                                  hours as           



                                                  needed for           



                                                  Pain           



                                                  (scale           



                                                  1-3).           

 

     acetaminoph      2022- No        [id number] 650mg      Take 2        

   [facility]



     en        5-05 05-06                          tablets by           [facility]



     (TYLENOL)      00:00: 04:59                          mouth           [place]



     325 mg      00   :00                           every 6           [facility]



     tablet                                         (six)           [facility]



                                                  hours as           



                                                  needed for           



                                                  Pain           



                                                  (scale           



                                                  1-3).           

 

     acetaminoph      2022- No        [id number] 650mg      Take 2        

   [facility]



     en        5-05 05-06                          tablets by           [facility]



     (TYLENOL)      00:00: 04:59                          mouth           [place]



     325 mg      00   :00                           every 6           [facility]



     tablet                                         (six)           [facility]



                                                  hours as           



                                                  needed for           



                                                  Pain           



                                                  (scale           



                                                  1-3).           

 

     acetaminoph      2022- No        [id number] 650mg      Take 2        

   [facility]



     en        5-05 05-06                          tablets by           [facility] of



     (TYLENOL)      00:00: 04:59                          mouth           [place]



     325 mg      00   :00                           every 6           [facility]



     tablet                                         (six)           [facility]



                                                  hours as           



                                                  needed for           



                                                  Pain           



                                                  (scale           



                                                  1-3).           

 

     acetaminoph      2022- No        [id number] 650mg      Take 2        

   [facility]



     en        5-05 05-06                          tablets by           [facility]



     (TYLENOL)      00:00: 04:59                          mouth           [place]



     325 mg      00   :00                           every 6           [facility]



     tablet                                         (six)           [facility]



                                                  hours as           



                                                  needed for           



                                                  Pain           



                                                  (scale           



                                                  1-3).           

 

     acetaminoph      2022- No        [id number] 650mg      Take 2        

   [facility]



     en        5-05 05-06                          tablets by           [facility] of



     (TYLENOL)      00:00: 04:59                          mouth           [place]



     325 mg      00   :00                           every 6           [facility]



     tablet                                         (six)           [facility]



                                                  hours as           



                                                  needed for           



                                                  Pain           



                                                  (scale           



                                                  1-3).           

 

     acetaminoph      2022- No        [id number] 650mg      Take 2        

   [facility]



     en        5-05 05-06                          tablets by           [facility]



     (TYLENOL)      00:00: 04:59                          mouth           [place]



     325 mg      00   :00                           every 6           [facility]



     tablet                                         (six)           [facility]



                                                  hours as           



                                                  needed for           



                                                  Pain           



                                                  (scale           



                                                  1-3).           

 

     acetaminoph      2022- No        [id number] 650mg      Take 2        

   [facility]



     en        5-05 05-06                          tablets by           [facility]



     (TYLENOL)      00:00: 04:59                          mouth           [place]



     325 mg      00   :00                           every 6           [facility]



     tablet                                         (six)           [facility]



                                                  hours as           



                                                  needed for           



                                                  Pain           



                                                  (scale           



                                                  1-3).           

 

     acetaminoph      2022- No        [id number] 650mg      Take 2        

   [facility]



     en        5-05 05-06                          tablets by           [facility] of



     (TYLENOL)      00:00: 04:59                          mouth           [place]



     325 mg      00   :00                           every 6           [facility]



     tablet                                         (six)           [facility]



                                                  hours as           



                                                  needed for           



                                                  Pain           



                                                  (scale           



                                                  1-3).           

 

     acetaminoph      2022- No        [id number] 650mg      Take 2        

   [facility]



     en        5-05 05-06                          tablets by           [facility] of



     (TYLENOL)      00:00: 04:59                          mouth           [place]



     325 mg      00   :00                           every 6           [facility]



     tablet                                         (six)           [facility]



                                                  hours as           



                                                  needed for           



                                                  Pain           



                                                  (scale           



                                                  1-3).           

 

     acetaminoph      2022- No        [id number] 650mg      Take 2        

   [facility]



     en        5-05 05-06                          tablets by           [facility] of



     (TYLENOL)      00:00: 04:59                          mouth           [place]



     325 mg      00   :00                           every 6           [facility]



     tablet                                         (six)           [facility]



                                                  hours as           



                                                  needed for           



                                                  Pain           



                                                  (scale           



                                                  1-3).           

 

     HYDROcodone      2021- No        [id number] 1{tbl}      Take 1           [facility]

[facility]



     -acetaminop      5-05 05-13                          tablet by           it

y of



     hen 5-325      00:00: 04:59                          mouth           [place]



     mg tablet      00   :00                           every 6           Medical



                                                  (six)           [facility]



                                                  hours as           



                                                  needed           



                                                  (Pain           



                                                  scale           



                                                  above 4)           



                                                  for up to           



                                                  7 days. Do           



                                                  not exceed           



                                                  3 grams of           



                                                  acetaminop           



                                                  hen in 24           



                                                  hours.           



                                                  Indication           



                                                  s: acute           



                                                  pain           

 

     ibuprofen            Yes            600mg      600 mg,           [facility]

[facility]



     (IBU)      5-04                               Oral, Q6H,           [facility]



     tablet 600      17:00:                               First dose           [facility]

[facility]



     mg        00                                 (after           [facility]



                                                  last           [facility]



                                                  modificati           



                                                  on) on 21 at           



                                                  1200,           



                                                  Until           



                                                  Discontinu           



                                                  ed,            



                                                  Routine           

 

     rho(D)            Yes            300ug      300 mcg,           [facility]

s



     immune      5-04                               Intramuscu           [facility]



     globulin      14:04:                               lar, ONCE,           [facility]

[facility]



     (RHOGAM)      16                                 For 1           Medical



     syringe 300                                         dose,           [facility]



     mcg                                          Conditiona           



                                                  l, Routine           

 

     diphenhydrA            Yes            25mg      25 mg, IV           [facility]

[facility]



     MINE-0.9 %      5-04                               Piggyback,           [facility]

 of



     [email or web address].[email or web address]      14:02:                               Administer           [facility]

[facility]



     (BENADRYL)      04                                 over 30           [facility]

l



     25 mg/50 mL                                         Minutes,           [facility]

ch



     piggyback                                         Q6HPRN, 1           



     25 mg                                         dose,           



                                                  Starting           



                                                  21           



                                                  at 0902,           



                                                  Until           



                                                  Discontinu           



                                                  ed,            



                                                  Routine,           



                                                  Itching           

 

     diphenhydrA            Yes            25mg      25 mg,           [facility]

ers



     MINE      5-04                               Oral,           ity of



     (BENADRYL)      14:02:                               Q6HPRN,           [place]

s



     tablet 25      04                                 Starting           [facility]

l



     mg                                           21           Branch



                                                  at 0902,           



                                                  Until           



                                                  Discontinu           



                                                  ed,            



                                                  Routine,           



                                                  Sleep,           



                                                  Itching           

 

     ondansetron            Yes            4mg       4 mg, Slow           

[facility]



     (ZOFRAN      5-04                               IV Push,           ity of



     (PF))      14:02:                               Q8HPRN,           [place]



     injection 4      03                                 Starting           Medi

lo



     mg                                           21           Branch



                                                  at 0902,           



                                                  Until           



                                                  Discontinu           



                                                  ed,            



                                                  Routine,           



                                                  Nausea and           



                                                  Vomiting           



                                                  (N/V)           

 

     bisacodyL            Yes            10mg      10 mg,           Univer

s



     (DULCOLAX)      5-04                               Rectal,           ity of



     suppository      14:02:                               QDAILYPRN,           

[place]



     10 mg      03                                 Starting           Medical



                                                  21           Branch



                                                  at 0902,           



                                                  Until           



                                                  Discontinu           



                                                  ed,            



                                                  Routine,           



                                                  Constipati           



                                                  on             

 

     simethicone            Yes            160mg      160 mg,           Un

[facility]



     (GAS RELIEF      5-04                               Oral,           ity of



     (SIMETHICON      14:02:                               PC+HSPRN,           [facility]

[place]



     E))       03                                 Starting           Medical



     chewable                                         21           [facility]

h



     tablet 160                                         at 0902,           



     mg                                           Until           



                                                  Discontinu           



                                                  ed,            



                                                  Routine,           



                                                  Gas            

 

     docusate            Yes            240mg      240 mg,           Unive

rs



     calcium      5-04                               Oral,           ity of



     (SURFAK)      14:02:                               QDAILYPRN,           [place]

as



     capsule 240      03                                 Starting           Medi

lo



     mg                                           21           [facility]



                                                  at 0902,           



                                                  Until           



                                                  Discontinu           



                                                  ed,            



                                                  Routine,           



                                                  Constipati           



                                                  on             

 

     magnesium            Yes            30mL      30 mL,           Univer

s



     hydroxide      5-04                               Oral,           ity of



     (MILK OF      14:02:                               QDAILYPRN,           [facility]

as



     MAGNESIA)      03                                 Starting           Medica

l



     400 mg/5 mL                                         21           Br

anch



     suspension                                         at 0902,           



     30 mL                                         Until           



                                                  Discontinu           



                                                  ed,            



                                                  Routine,           



                                                  Constipati           



                                                  on             

 

     HYDROcodone            Yes            1{tbl}      [Order 1           

[facility]



     -acetaminop      5-                               Start]           ity of



     hen (NORCO      13:12:                               Name:           [place]



     5) 5-325 mg      41                                 HYDROcodon           Me

dical



     tablet 1                                         e-acetamin           [facility]

h



     tablet                                         ophen           



                                                  (NORCO 5)           



                                                  5-325 mg           



                                                  tablet 1           



                                                  tablet           



                                                  Signed           



                                                  Summary: 1           



                                                  tablet,           



                                                  Oral,           



                                                  Q6HPRN,           



                                                  Starting           



                                                  21           



                                                  at 0812,           



                                                  Until           



                                                  Discontinu           



                                                  ed,            



                                                  Routine,           



                                                  Pain           



                                                  (scale           



                                                  4-6)           



                                                  [Order 1           



                                                  End]           



                                                  [Order 2           



                                                  Start]           



                                                  Name:           



                                                  HYDROcodon           



                                                  e-acetamin           



                                                  ophen           



                                                  (NORCO 5)           



                                                  5-325 mg           



                                                  tablet 2           



                                                  tablet           



                                                  Signed           



                                                  Summary: 2           



                                                  tablet,           



                                                  Oral,           



                                                  Q6HPRN,           



                                                  Starting           



                                                  21           



                                                  at 0812,           



                                                  Until           



                                                  Discontinu           



                                                  ed,            



                                                  Routine,           



                                                  Pain           



                                                  (scale           



                                                  7-10)           



                                                  [Order 2           



                                                  End]           

 

     ondansetron      2021- No                       Slow IV           Un

[facility]



     (ZOFRAN       05-04                          Push, ONCE           ity o

f



     (PF))      02:46: 03:20                          INTRA           [place]



     injection      00   :39                           PROCEDURE,           Medi

lo



                                                  Starting           Branch



                                                  Mon 5/3/21           



                                                  at 2146,           



                                                  Until Mon           



                                                  5/3/21 at           



                                                  2220,           



                                                  Routine,           



                                                  Intra-op           

 

     PHENYLephri      2021- No                       Slow IV           Un

[facility]



     ne 1000      5- 05-04                          Push, ONCE           ity o

f



     mcg/10 mL      02:38: 03:20                          INTRA           [place]



     in 0.9%      00   :39                           PROCEDURE,           Medica

l



     NaCl                                         Starting           Br[facility]



     syringe                                         Mon 5/3/21           



                                                  at ,           



                                                  Until Mon           



                                                  5/3/21 at           



                                                  ,           



                                                  Routine,           



                                                  Intra-op           

 

     morpHINE PF      2021- No                       Intravenou          

 [facility]



     (DURAMORPH-                                s, ONCE           ity 

of



     PF)       02:23: 03:20                          INTRA           [place]



     injection      00   :39                           PROCEDURE,           Medi

lo



                                                  Starting           Br[facility]



                                                  Mon 5/3/21           



                                                  at ,           



                                                  Until           



                                                  Discontinu           



                                                  ed,            



                                                  Routine,           



                                                  Intra-op           

 

     LR 1000 mL      2021- No                       IV             Univer

s



     + oxytocin                                Infusion,           ity

 of



     40 units 40      02:17: 03:20                          CONTINUOUS          

 [place]



     unit/ 1,000      00   :39                           PRN,           Medical



     mL IV                                         Starting           [facility]



     Solution                                         Mon 5/3/21           



                                                  at ,           



                                                  Until           



                                                  Discontinu           



                                                  ed,            



                                                  Routine,           



                                                  Intra-op           

 

     FENTanyl PF      2021- No                       Intravenou          

 [facility]



     (SUBLIMAZE                                s, ONCE           ity o

f



     (PF))      02:17: 03:20                          INTRA           [place]



     injection      00   :39                           PROCEDURE,           Medi

lo



                                                  Starting           Branch



                                                  Mon 5/3/21           



                                                  at ,           



                                                  Until           



                                                  Discontinu           



                                                  ed,            



                                                  Routine,           



                                                  Intra-op           

 

     metroNIDAZO            Yes            500mg      500 mg, IV          

 [facility]



     LE in NaCl                                     Piggyback,           ity

 of



     (iso-os)      02:15:                               Q8H ABX,           [place]



     (FLAGYL      00                                 First dose           Medica

l



     I.V.) RTU                                         on 



     IV infusion                                         5/3/21 at           



     500 mg                                         ,           



                                                  Until           



                                                  Discontinu           



                                                  ed, 100           



                                                  mL<br>Reas           



                                                  on for           



                                                  Anti-Infec           



                                                  tive:           



                                                  Surgical           



                                                  Prophylaxi           



                                                  s<br>Surgi           



                                                  lo            



                                                  Prophylaxi           



                                                  s:             



                                                  OB/GYN<br>           



                                                  Duration           



                                                  of             



                                                  therapy:           



                                                  within 24           



                                                  hours of           



                                                  surgery           

 

     midazolam      2021- No                       IV Push,           Uni

vers



     (VERSED)      -                          ONCE INTRA           ity 

of



     injection      02:10: 03:20                          PROCEDURE,           [facility]

[place]



               00   :39                           Starting           Medical



                                                  Mon 5/3/21           Branch



                                                  at ,           



                                                  Until           



                                                  Discontinu           



                                                  ed,            



                                                  Routine,           



                                                  Intra-op           

 

     succinylcho      2021- No                       IV Push,           U

nivers



     line      -                          ONCE INTRA           ity of



     (QUELICIN)      02:08: 03:20                          PROCEDURE,           

[place]



     injection      00   :39                           Starting           Med[facility]

l



                                                  Mon 5/3/21           Br[facility]



                                                  at 2108,           



                                                  Until           



                                                  Discontinu           



                                                  ed,            



                                                  Routine,           



                                                  Intra-op           

 

     propofoL IV      2021- No                       Intravenou          

 [facility]



     infusion      -                          s, ONCE           ity of



               02:08: 03:20                          INTRA           [place]



               00   :39                           PROCEDURE,           Medical



                                                  Starting           [facility]



                                                  Mon 5/3/21           



                                                  at 2108,           



                                                  Until           



                                                  Discontinu           



                                                  ed,            



                                                  Routine,           



                                                  Intra-op           

 

     lactated      2021- No                       Intravenou           Un

[facility]



     ringers IV      -                          s,             ity of



     infusion      02:01: 03:20                          CONTINUOUS           [place]

[place]



               00   :39                           PRN,           Medical



                                                  Starting           [facility]



                                                  Mon 5/3/21           



                                                  at 2101,           



                                                  Until           



                                                  Discontinu           



                                                  ed,            



                                                  Routine,           



                                                  Intra-op           

 

     lidocaine      2021- No                       Epidural,           Un

[facility]



     2% +      -                          CONTINUOUS           ity of



     epinephrine      01:52: 03:20                          PRN,           [place]



     1:1000 +      00   :39                           Starting           Medical



     fentanyl 50                                         Mon 5/3/21           Br

anch



     mcg/mL                                         at ,           



                                                  Until Mon           



                                                  5/3/21 at           



                                                  0,           



                                                  Routine,           



                                                  Intra-op           

 

     FENTanyl 2      2021- No                       Intra-op           Un

[facility]



     mcg/mL +      -04                                         ity of



     bupivacaine      05:11: 03:20                                         [place]



     0.1% in NS      00   :39                                          Medical



     250 mL                                                        [facility]



     epidural                                                        



     bag                                                         

 

     lidocaine-e      2021- No                       Intraderma          

 [facility]



     pinephrine      -04                          l, ONCE           ity o

f



     (XYLOCAINE      05:07: 03:20                          INTRA           [place]



     W/EPINEPHRI      00   :39                           PROCEDURE,           Me

dical



     NE) 1.5                                         Starting           Branch



     %-1:200,000                                         Mon 5/3/21           



     injection                                         at 0007,           



                                                  Until           



                                                  Discontinu           



                                                  ed,            



                                                  Routine,           



                                                  Intra-op           

 

     lidocaine      2021- No                       Infiltrati           U

nivers



     1%        04                          on, ONCE           ity of



     (XYLOCAINE)      05:06: 03:20                          INTRA           [place]

[facility]



     100 mg/10      00   :39                           PROCEDURE,           Medi

lo



     mL (1 %)                                         Starting           Branch



     injection                                         Mon 5/3/21           



                                                  at 0006,           



                                                  Until           



                                                  Discontinu           



                                                  ed,            



                                                  Routine,           



                                                  Intra-op           

 

     dexamethaso      2021- No             10mg      10 mg,           Uni

vers



     ne        3-04 03-04                          Oral,           ity of



     (DECADRON      21:30: 20:45                          ONCE, 1           [place]

[place]



     PHOSPHATE)      00   :00                           dose, Thu           Medi

lo



     injection                                         3/4/21 at           [facility]

h



     10 mg                                         1530,           



                                                  Routine           

 

     proMETHazin      2020      Yes       [id number] 25mg      Take 1           

[facility]



     e 25 mg      2-21                               tablet by           ity of



     tablet      00:00:                               mouth           [place]



               00                                 every 4           Medical



                                                  (four)           Branch



                                                  hours as           



                                                  needed for           



                                                  Nausea and           



                                                  Vomiting           



                                                  (N/V).           

 

     proMETHazin      2020      Yes       [id number] 25mg      Take 1           

[facility]



     e 25 mg      2-21                               tablet by           ity of



     tablet      00:00:                               mouth           [place]



               00                                 every 4           Medical



                                                  (four)           Branch



                                                  hours as           



                                                  needed for           



                                                  Nausea and           



                                                  Vomiting           



                                                  (N/V).           

 

     proMETHazin      2020      Yes       [id number] 25mg      Take 1           

[facility]



     e 25 mg      2-21                               tablet by           [facility] of



     tablet      00:00:                               mouth           [place]



               00                                 every 4           Medical



                                                  (four)           Branch



                                                  hours as           



                                                  needed for           



                                                  Nausea and           



                                                  Vomiting           



                                                  (N/V).           

 

     proMETHazin      2020      Yes       [id number] 25mg      Take 1           

[facility]



     e 25 mg      2-21                               tablet by           [facility] of



     tablet      00:00:                               mouth           [place]



               00                                 every 4           Medical



                                                  (four)           Branch



                                                  hours as           



                                                  needed for           



                                                  Nausea and           



                                                  Vomiting           



                                                  (N/V).           

 

     proMETHazin      2020      Yes       [id number] 25mg      Take 1           

[facility]



     e 25 mg      2-21                               tablet by           [facility] of



     tablet      00:00:                               mouth           [place]



               00                                 every 4           Medical



                                                  (four)           Branch



                                                  hours as           



                                                  needed for           



                                                  Nausea and           



                                                  Vomiting           



                                                  (N/V).           

 

     proMETHazin      2020      Yes       [id number] 25mg      Take 1           

[facility]



     e 25 mg      2-21                               tablet by           ity of



     tablet      00:00:                               mouth           [place]



               00                                 every 4           Medical



                                                  (four)           Branch



                                                  hours as           



                                                  needed for           



                                                  Nausea and           



                                                  Vomiting           



                                                  (N/V).           

 

     proMETHazin      2020      Yes       [id number] 25mg      Take 1           

[facility]



     e 25 mg      2-21                               tablet by           [facility] of



     tablet      00:00:                               mouth           [place]



               00                                 every 4           Medical



                                                  (four)           Branch



                                                  hours as           



                                                  needed for           



                                                  Nausea and           



                                                  Vomiting           



                                                  (N/V).           

 

     proMETHazin      2020      Yes       [id number] 25mg      Take 1           

[facility]



     e 25 mg      2-21                               tablet by           [facility] of



     tablet      00:00:                               mouth           [place]



               00                                 every 4           Medical



                                                  (four)           Branch



                                                  hours as           



                                                  needed for           



                                                  Nausea and           



                                                  Vomiting           



                                                  (N/V).           

 

     proMETHazin      2020      Yes       [id number] 25mg      Take 1           

[facility]



     e 25 mg      2-21                               tablet by           [facility] of



     tablet      00:00:                               mouth           [place]



               00                                 every 4           Medical



                                                  (four)           Branch



                                                  hours as           



                                                  needed for           



                                                  Nausea and           



                                                  Vomiting           



                                                  (N/V).           

 

     proMETHazin      2020-1      Yes       [id number] 25mg      Take 1           

[facility]



     e 25 mg      2-21                               tablet by           [facility] of



     tablet      00:00:                               mouth           [place]



               00                                 every 4           Medical



                                                  (four)           Branch



                                                  hours as           



                                                  needed for           



                                                  Nausea and           



                                                  Vomiting           



                                                  (N/V).           

 

     proMETHazin      2020-1      Yes       [id number] 25mg      Take 1           

[facility]



     e 25 mg      2-21                               tablet by           [facility] of



     tablet      00:00:                               mouth           [place]



               00                                 every 4           Medical



                                                  (four)           Branch



                                                  hours as           



                                                  needed for           



                                                  Nausea and           



                                                  Vomiting           



                                                  (N/V).           

 

     proMETHazin      2020-1      Yes       [id number] 25mg      Take 1           

[facility]



     e 25 mg      2-21                               tablet by           [facility] of



     tablet      00:00:                               mouth           [place]



               00                                 every 4           Medical



                                                  (four)           Branch



                                                  hours as           



                                                  needed for           



                                                  Nausea and           



                                                  Vomiting           



                                                  (N/V).           

 

     proMETHazin      -1      Yes       [id number] 25mg      Take 1           

[facility]



     e 25 mg      2-21                               tablet by           [facility] of



     tablet      00:00:                               mouth           [place]



               00                                 every 4           Medical



                                                  (four)           Branch



                                                  hours as           



                                                  needed for           



                                                  Nausea and           



                                                  Vomiting           



                                                  (N/V).           

 

     proMETHazin      -1      Yes       [id number] 25mg      Take 1           

[facility]



     e 25 mg      2-21                               tablet by           [facility] of



     tablet      00:00:                               mouth           [place]



               00                                 every 4           Medical



                                                  (four)           Branch



                                                  hours as           



                                                  needed for           



                                                  Nausea and           



                                                  Vomiting           



                                                  (N/V).           

 

     proMETHazin      2020-1      Yes       [id number] 25mg      Take 1           

[facility]



     e 25 mg      2-21                               tablet by           [facility] of



     tablet      00:00:                               mouth           [place]



               00                                 every 4           Medical



                                                  (four)           Branch



                                                  hours as           



                                                  needed for           



                                                  Nausea and           



                                                  Vomiting           



                                                  (N/V).           

 

     proMETHazin      2020-1      Yes       [id number] 25mg      Take 1           

[facility]



     e 25 mg      2-21                               tablet by           [facility] of



     tablet      00:00:                               mouth           [place]



               00                                 every 4           Medical



                                                  (four)           Branch



                                                  hours as           



                                                  needed for           



                                                  Nausea and           



                                                  Vomiting           



                                                  (N/V).           

 

     proMETHazin      2020-1      Yes       [id number] 25mg      Take 1           

[facility]



     e 25 mg      2-21                               tablet by           [facility] of



     tablet      00:00:                               mouth           [place]



               00                                 every 4           Medical



                                                  (four)           Branch



                                                  hours as           



                                                  needed for           



                                                  Nausea and           



                                                  Vomiting           



                                                  (N/V).           

 

     proMETHazin      2020-1      Yes       [id number] 25mg      Take 1           

[facility]



     e 25 mg      2-21                               tablet by           [facility] of



     tablet      00:00:                               mouth           [place]



               00                                 every 4           Medical



                                                  (four)           Branch



                                                  hours as           



                                                  needed for           



                                                  Nausea and           



                                                  Vomiting           



                                                  (N/V).           

 

     proMETHazin      2020      Yes       [id number] 25mg      Take 1           

[facility]



     e 25 mg      2-21                               tablet by           [facility] of



     tablet      00:00:                               mouth           [place]



               00                                 every 4           Medical



                                                  (four)           Branch



                                                  hours as           



                                                  needed for           



                                                  Nausea and           



                                                  Vomiting           



                                                  (N/V).           

 

     prenatal      2020      Yes       [id number] 1{packe      Take 1           

[facility]



     vit       0-12                     t}        Packet by           [facility] of



     33-iron-fol      00:00:                               mouth           [place]



     ic-dha      00                                 daily.           Medical



     (SELECT-OB                                                        Branch



     + DHA) 29                                                        



     mg iron-1                                                        



     mg -250 mg                                                        



     combo pack                                                        

 

     prenatal      2020      Yes       [id number] 1{packe      Take 1           

[facility]



     vit       0-12                     t}        Packet by           [facility] of



     33-iron-fol      00:00:                               mouth           [place]



     ic-dha      00                                 daily.           Medical



     (SELECT-OB                                                        Branch



     + DHA) 29                                                        



     mg iron-1                                                        



     mg -250 mg                                                        



     combo pack                                                        

 

     prenatal      2020      Yes       [id number] 1{packe      Take 1           

[facility]



     vit       0-12                     t}        Packet by           ity of



     33-iron-fol      00:00:                               mouth           [place]



     ic-dha      00                                 daily.           Medical



     (SELECT-OB                                                        Branch



     + DHA) 29                                                        



     mg iron-1                                                        



     mg -250 mg                                                        



     combo pack                                                        

 

     prenatal      2020      Yes       [id number] 1{packe      Take 1           

[facility]



     vit       0-12                     t}        Packet by           [facility] of



     33-iron-fol      00:00:                               mouth           [place]



     ic-dha      00                                 daily.           Medical



     (SELECT-OB                                                        Branch



     + DHA) 29                                                        



     mg iron-1                                                        



     mg -250 mg                                                        



     combo pack                                                        

 

     prenatal      2020      Yes       [id number] 1{packe      Take 1           

[facility]



     vit       0-12                     t}        Packet by           ity of



     33-iron-fol      00:00:                               mouth           [place]



     ic-dha      00                                 daily.           Medical



     (SELECT-OB                                                        Branch



     + DHA) 29                                                        



     mg iron-1                                                        



     mg -250 mg                                                        



     combo pack                                                        

 

     prenatal      2020      Yes       [id number] 1{packe      Take 1           

[facility]



     vit       0-12                     t}        Packet by           ity of



     33-iron-fol      00:00:                               mouth           [place]



     ic-dha      00                                 daily.           Medical



     (SELECT-OB                                                        Branch



     + DHA) 29                                                        



     mg iron-1                                                        



     mg -250 mg                                                        



     combo pack                                                        

 

     prenatal      2020      Yes       [id number] 1{packe      Take 1           

[facility]



     vit       0-12                     t}        Packet by           ity of



     33-iron-fol      00:00:                               mouth           [place]



     ic-dha      00                                 daily.           Medical



     (SELECT-OB                                                        Branch



     + DHA) 29                                                        



     mg iron-1                                                        



     mg -250 mg                                                        



     combo pack                                                        

 

     prenatal      -      Yes       [id number] 1{packe      Take 1           

[facility]



     vit       0-12                     t}        Packet by           ity of



     33-iron-fol      00:00:                               mouth           [place]



     ic-dha      00                                 daily.           Medical



     (SELECT-OB                                                        Branch



     + DHA) 29                                                        



     mg iron-1                                                        



     mg -250 mg                                                        



     combo pack                                                        

 

     prenatal      2020      Yes       [id number] 1{packe      Take 1           

[facility]



     vit       0-12                     t}        Packet by           ity of



     33-iron-fol      00:00:                               mouth           [place]



     ic-dha      00                                 daily.           Medical



     (SELECT-OB                                                        Branch



     + DHA) 29                                                        



     mg iron-1                                                        



     mg -250 mg                                                        



     combo pack                                                        

 

     prenatal      2020      Yes       [id number] 1{packe      Take 1           

[facility]



     vit       0-12                     t}        Packet by           ity of



     33-iron-fol      00:00:                               mouth           [place]



     ic-dha      00                                 daily.           Medical



     (SELECT-OB                                                        Branch



     + DHA) 29                                                        



     mg iron-1                                                        



     mg -250 mg                                                        



     combo pack                                                        

 

     prenatal      2020      Yes       [id number] 1{packe      Take 1           

[facility]



     vit       0-12                     t}        Packet by           ity of



     33-iron-fol      00:00:                               mouth           [place]



     ic-dha      00                                 daily.           Medical



     (SELECT-OB                                                        Branch



     + DHA) 29                                                        



     mg iron-1                                                        



     mg -250 mg                                                        



     combo pack                                                        

 

     prenatal      2020      Yes       [id number] 1{packe      Take 1           

[facility]



     vit       0-12                     t}        Packet by           ity of



     33-iron-fol      00:00:                               mouth           [place]



     ic-dha      00                                 daily.           Medical



     (SELECT-OB                                                        Branch



     + DHA) 29                                                        



     mg iron-1                                                        



     mg -250 mg                                                        



     combo pack                                                        

 

     prenatal      2020      Yes       [id number] 1{packe      Take 1           

[facility]



     vit       0-12                     t}        Packet by           ity of



     33-iron-fol      00:00:                               mouth           [place]



     ic-dha      00                                 daily.           Medical



     (SELECT-OB                                                        Branch



     + DHA) 29                                                        



     mg iron-1                                                        



     mg -250 mg                                                        



     combo pack                                                        

 

     prenatal      2020      Yes       [id number] 1{packe      Take 1           

[facility]



     vit       0-12                     t}        Packet by           ity of



     33-iron-fol      00:00:                               mouth           [place]



     ic-dha      00                                 daily.           Medical



     (SELECT-OB                                                        Branch



     + DHA) 29                                                        



     mg iron-1                                                        



     mg -250 mg                                                        



     combo pack                                                        

 

     prenatal      2020      Yes       [id number] 1{packe      Take 1           

[facility]



     vit       0-12                     t}        Packet by           ity of



     33-iron-fol      00:00:                               mouth           [place]



     ic-dha      00                                 daily.           Medical



     (SELECT-OB                                                        Branch



     + DHA) 29                                                        



     mg iron-1                                                        



     mg -250 mg                                                        



     combo pack                                                        

 

     prenatal      2020      Yes       [id number] 1{packe      Take 1           

[facility]



     vit       0-12                     t}        Packet by           ity of



     33-iron-fol      00:00:                               mouth           [place]



     ic-dha      00                                 daily.           [facility]



     ([facility]                                                        [facility]



     + DHA) 29                                                        



     mg iron-1                                                        



     mg -250 mg                                                        



     combo pack                                                        

 

     prenatal      2020      Yes       [id number] 1{packe      Take 1           

[facility]



     vit       0-12                     t}        Packet by           ity of



     33-iron-fol      00:00:                               mouth           [place]



     ic-dha      00                                 daily.           [facility]



     ([facility]                                                        [facility]



     + DHA) 29                                                        



     mg iron-1                                                        



     mg -250 mg                                                        



     combo pack                                                        

 

     prenatal      2020      Yes       [id number] 1{packe      Take 1           

[facility]



     vit       0-12                     t}        Packet by           ity of



     33-iron-fol      00:00:                               mouth           [place]



     ic-dha      00                                 daily.           [facility]



     ([facility]                                                        [facility]



     + DHA) 29                                                        



     mg iron-1                                                        



     mg -250 mg                                                        



     combo pack                                                        

 

     prenatal      2020      Yes       [id number] 1{packe      Take 1           

[facility]



     vit       0-12                     t}        Packet by           ity of



     33-iron-fol      00:00:                               mouth           [place]



     ic-dha      00                                 daily.           [facility]



     ([facility]                                                        [facility]



     + DHA) 29                                                        



     mg iron-1                                                        



     mg -250 mg                                                        



     combo pack                                                        

 

     prenatal      2020      Yes       [id number] 1{packe      Take 1           

[facility]



     vit       0-12                     t}        Packet by           ity of



     33-iron-fol      00:00:                               mouth           [place]



     ic-dha      00                                 daily.           [facility]



     ([facility]                                                        [facility]



     + DHA) 29                                                        



     mg iron-1                                                        



     mg -250 mg                                                        



     combo pack                                                        

 

     prenatal      2020      Yes       [id number] 1{packe      Take 1           

[facility]



     vit       0-12                     t}        Packet by           ity of



     33-iron-fol      00:00:                               mouth           [place]



     ic-dha      00                                 daily.           [facility]



     ([facility]                                                        [facility]



     + DHA) 29                                                        



     mg iron-1                                                        



     mg -250 mg                                                        



     combo pack                                                        

 

     prenatal      2020      Yes       [id number] 1{packe      Take 1           

[facility]



     vit       0-12                     t}        Packet by           ity of



     33-iron-fol      00:00:                               mouth           [place]



     ic-dha      00                                 daily.           [facility]



     ([facility]                                                        [facility]



     + DHA) 29                                                        



     mg iron-1                                                        



     mg -250 mg                                                        



     combo pack                                                        

 

     prenatal      2020      Yes       [id number] 1{packe      Take 1           

[facility]



     vit       0-12                     t}        Packet by           ity of



     33-iron-fol      00:00:                               mouth           [place]



     ic-dha      00                                 daily.           [facility]



     ([facility]                                                        [facility]



     + DHA) 29                                                        



     mg iron-1                                                        



     mg -250 mg                                                        



     combo pack                                                        

 

     prenatal      2020      Yes       [id number] 1{packe      Take 1           

[facility]



     vit       0-12                     t}        Packet by           ity of



     33-iron-fol      00:00:                               mouth           [place]



     ic-dha      00                                 daily.           [facility]



     ([facility]                                                        [facility]



     + DHA) 29                                                        



     mg iron-1                                                        



     mg -250 mg                                                        



     combo pack                                                        

 

     prenatal      2020      Yes       [id number] 1{packe      Take 1           

[facility]



     vit       0-12                     t}        Packet by           ity of



     33-iron-fol      00:00:                               mouth           [place]



     ic-dha                                       daily.           [facility]



     ([facility]                                                        [facility]



     + DHA) 29                                                        



     mg iron-1                                                        



     mg -250 mg                                                        



     combo pack                                                        

 

     prenatal      2020      Yes       [id number] 1{packe      Take 1           

[facility]



     vit       0-12                     t}        Packet by           ity of



     33-iron-fol      00:00:                               mouth           [place]



     ic-dha                                       daily.           [facility]



     ([facility]                                                        [facility]



     + DHA) 29                                                        



     mg iron-1                                                        



     mg -250 mg                                                        



     combo pack                                                        

 

     prenatal      2020      Yes       [id number] 1{packe      Take 1           

[facility]



     vit       0-12                     t}        Packet by           ity of



     33-iron-fol      00:00:                               mouth           [place]



     ic-dha                                       daily.           [facility]



     ([facility]                                                        [facility]



     + DHA) 29                                                        



     mg iron-1                                                        



     mg -250 mg                                                        



     combo pack                                                        

 

     prenatal      2020      Yes       [id number] 1{packe      Take 1           

[facility]



     vit       0-12                     t}        Packet by           ity of



     33-iron-fol      00:00:                               mouth           [place]



     ic-dha                                       daily.           [facility]



     ([facility]                                                        [facility]



     + DHA) 29                                                        



     mg iron-1                                                        



     mg -250 mg                                                        



     combo pack                                                        

 

     prenatal      2020      Yes       [id number] 1{packe      Take 1           

[facility]



     vit       0-12                     t}        Packet by           ity of



     33-iron-fol      00:00:                               mouth           [place]



     ic-dha      00                                 daily.           [facility]



     ([facility]                                                        [facility]



     + DHA) 29                                                        



     mg iron-1                                                        



     mg -250 mg                                                        



     combo pack                                                        

 

     metroNIDAZO      2020 2020- No        [id number] 1000mg      Take 2        

   [facility]



      mg      0-07 10-09                          tablets by           [facility]

 of



     tablet      00:00: 04:59                          mouth 2           [place]



               00   :00                           (two)           Medical



                                                  times           Branch



                                                  daily for           



                                                  1 day.           

 

     metroNIDAZO      2020- No        [id number] 1000mg      Take 2        

   [facility]



      mg      0-07 10-09                          tablets by           [facility]

 of



     tablet      00:00: 04:59                          mouth 2           [place]



               00   :00                           (two)           [facility]



                                                  times           Branch



                                                  daily for           



                                                  1 day.           

 

     metroNIDAZO      2020- No        [id number] 1000mg      Take 2        

   [facility]



      mg      0-07 10-09                          tablets by           [facility]

 of



     tablet      00:00: 04:59                          mouth 2           [place]



               00   :00                           (two)           Medical



                                                  times           Branch



                                                  daily for           



                                                  1 day.           

 

     metroNIDAZO      2020- No        [id number] 1000mg      Take 2        

   [facility]



      mg      0-07 10-09                          tablets by           [facility]

 of



     tablet      00:00: 04:59                          mouth 2           [place]



               00   :00                           (two)           [facility]



                                                  times           Branch



                                                  daily for           



                                                  1 day.           

 

     proMETHazin      2020      Yes       [id number] 25mg      Take 1           

[facility]



     e 25 mg      0-05                               tablet by           ity of



     tablet      00:00:                               mouth           [place]



               00                                 every 4           [facility]



                                                  (four)           Branch



                                                  hours as           



                                                  needed for           



                                                  Nausea and           



                                                  Vomiting           



                                                  (N/V).           

 

     proMETHazin      2020      Yes       [id number] 25mg      Take 1           

[facility]



     e 25 mg      0-05                               tablet by           ity of



     tablet      00:00:                               mouth           [place]



               00                                 every 4           [facility]



                                                  (four)           Branch



                                                  hours as           



                                                  needed for           



                                                  Nausea and           



                                                  Vomiting           



                                                  (N/V).           

 

     proMETHazin      2020      Yes       [id number] 25mg      Take 1           

[facility]



     e 25 mg      0-05                               tablet by           ity of



     tablet      00:00:                               mouth           [place]



               00                                 every 4           [facility]



                                                  (four)           Branch



                                                  hours as           



                                                  needed for           



                                                  Nausea and           



                                                  Vomiting           



                                                  (N/V).           

 

     proMETHazin      2020      Yes       [id number] 25mg      Take 1           

[facility]



     e 25 mg      0-05                               tablet by           ity of



     tablet      00:00:                               mouth           [place]



               00                                 every 4           [facility]



                                                  (four)           Branch



                                                  hours as           



                                                  needed for           



                                                  Nausea and           



                                                  Vomiting           



                                                  (N/V).           

 

     proMETHazin      2020      Yes       [id number] 25mg      Take 1           

[facility]



     e 25 mg      0-05                               tablet by           ity of



     tablet      00:00:                               mouth           [place]



               00                                 every 4           [facility]



                                                  (four)           Branch



                                                  hours as           



                                                  needed for           



                                                  Nausea and           



                                                  Vomiting           



                                                  (N/V).           

 

     proMETHazin      2020      Yes       [id number] 25mg      Take 1           

[facility]



     e 25 mg      0-05                               tablet by           ity of



     tablet      00:00:                               mouth           [place]



               00                                 every 4           Medical



                                                  (four)           Branch



                                                  hours as           



                                                  needed for           



                                                  Nausea and           



                                                  Vomiting           



                                                  (N/V).           

 

     proMETHazin      2020-1      Yes       [id number] 25mg      Take 1           

[facility]



     e 25 mg      0-05                               tablet by           ity of



     tablet      00:00:                               mouth           [place]



               00                                 every 4           [facility]



                                                  (four)           Branch



                                                  hours as           



                                                  needed for           



                                                  Nausea and           



                                                  Vomiting           



                                                  (N/V).           

 

     proMETHazin      2020-1      Yes       [id number] 25mg      Take 1           

[facility]



     e 25 mg      0-05                               tablet by           ity of



     tablet      00:00:                               mouth           [place]



               00                                 every 4           [facility]



                                                  (four)           Branch



                                                  hours as           



                                                  needed for           



                                                  Nausea and           



                                                  Vomiting           



                                                  (N/V).           

 

     proMETHazin      2020-1      Yes       [id number] 25mg      Take 1           

[facility]



     e 25 mg      0-05                               tablet by           ity of



     tablet      00:00:                               mouth           [place]



               00                                 every 4           [facility]



                                                  (four)           Branch



                                                  hours as           



                                                  needed for           



                                                  Nausea and           



                                                  Vomiting           



                                                  (N/V).           

 

     proMETHazin      2020-1      Yes       [id number] 25mg      Take 1           

[facility]



     e 25 mg      0-05                               tablet by           ity of



     tablet      00:00:                               mouth           [place]



               00                                 every 4           [facility]



                                                  (four)           Branch



                                                  hours as           



                                                  needed for           



                                                  Nausea and           



                                                  Vomiting           



                                                  (N/V).           

 

     proMETHazin      -1      Yes       [id number] 25mg      Take 1           

[facility]



     e 25 mg      0-05                               tablet by           ity of



     tablet      00:00:                               mouth           [place]



               00                                 every 4           [facility]



                                                  (four)           Branch



                                                  hours as           



                                                  needed for           



                                                  Nausea and           



                                                  Vomiting           



                                                  (N/V).           

 

     proMETHazin      2020-1      Yes       [id number] 25mg      Take 1           

[facility]



     e 25 mg      0-05                               tablet by           ity of



     tablet      00:00:                               mouth           [place]



               00                                 every 4           [facility]



                                                  (four)           Branch



                                                  hours as           



                                                  needed for           



                                                  Nausea and           



                                                  Vomiting           



                                                  (N/V).           

 

     proMETHazin      -1      Yes       [id number] 25mg      Take 1           

[facility]



     e 25 mg      0-05                               tablet by           ity of



     tablet      00:00:                               mouth           [place]



               00                                 every 4           [facility]



                                                  (four)           Branch



                                                  hours as           



                                                  needed for           



                                                  Nausea and           



                                                  Vomiting           



                                                  (N/V).           

 

     proMETHazin      2020-1      Yes       [id number] 25mg      Take 1           

[facility]



     e 25 mg      0-05                               tablet by           ity of



     tablet      00:00:                               mouth           [place]



               00                                 every 4           [facility]



                                                  (four)           Branch



                                                  hours as           



                                                  needed for           



                                                  Nausea and           



                                                  Vomiting           



                                                  (N/V).           

 

     proMETHazin      2020-1      Yes       [id number] 25mg      Take 1           

[facility]



     e 25 mg      0-05                               tablet by           ity of



     tablet      00:00:                               mouth           [place]



               00                                 every 4           [facility]



                                                  (four)           Branch



                                                  hours as           



                                                  needed for           



                                                  Nausea and           



                                                  Vomiting           



                                                  (N/V).           

 

     proMETHazin      2020-1      Yes       [id number] 25mg      Take 1           

[facility]



     e 25 mg      0-05                               tablet by           ity of



     tablet      00:00:                               mouth           [place]



               00                                 every 4           [facility]



                                                  (four)           Branch



                                                  hours as           



                                                  needed for           



                                                  Nausea and           



                                                  Vomiting           



                                                  (N/V).           

 

     proMETHazin      2020-1      Yes       [id number] 25mg      Take 1           

[facility]



     e 25 mg      0-05                               tablet by           ity of



     tablet      00:00:                               [facility]           [place]



               00                                 every 4           Medical



                                                  (four)           Branch



                                                  hours as           



                                                  needed for           



                                                  Nausea and           



                                                  Vomiting           



                                                  (N/V).           

 

     proMETHazin      2020-1      Yes       [id number] 25mg      Take 1           

[facility]



     e 25 mg      0-05                               tablet by           [facility]



     tablet      00:00:                               [facility]           [place]



               00                                 every 4           Medical



                                                  (four)           Branch



                                                  hours as           



                                                  needed for           



                                                  Nausea and           



                                                  Vomiting           



                                                  (N/V).           

 

     proMETHazin      2020 2020- No        [id number] 25mg      Take 1          

 [facility]



     e 25 mg      0-05 12-17                          tablet by           [facility]



     tablet      00:00: 00:00                          [facility]           [place]



               00   :00                           every 4           Medical



                                                  (four)           Branch



                                                  hours as           



                                                  needed for           



                                                  Nausea and           



                                                  Vomiting           



                                                  (N/V).           

 

     metoclopram      -0 - No             10mg      10 mg,           [facility]

vers



     dylan HCl                                Slow IV           [facility]



     (REGLAN)      21:45: 20:53                          Push,           [place]



     injection      00   :00                           ONCE, 1           Medical



     10 mg                                         dose, Mon           Branch



                                                  20 at           



                                                  1645, ASAP           

 

     NaCl 0.9%      -0 2020- No             1000mL      at 999           [facility]

vers



     (NS) bolus                                mL/hr,           [facility]



     infusion      20:45: 22:02                          1,000 mL,           [facility]

as



     1,000 mL      00   :00                           IV             Medical



                                                  Infusion,           Branch



                                                  ONCE, 1           



                                                  dose, Mon           



                                                  20 at           



                                                  1545, ASAP           

 

     metoclopram      2020-0      Yes       [id number] 10mg      Take 1           

[facility]



     dylan HCl 10      9-21                               tablet by           [facility] 

of



     mg tablet      00:00:                               [facility]           [place]



               00                                 every 6           Medical



                                                  (six)           Branch



                                                  hours as           



                                                  needed for           



                                                  Nausea and           



                                                  Vomiting           



                                                  (N/V).           

 

     metoclopram      2020-0      Yes       [id number] 10mg      Take 1           

[facility]



     dylan HCl 10      9-21                               tablet by           [facility] 

of



     mg tablet      00:00:                               [facility]           [place]



               00                                 every 6           Medical



                                                  (six)           Branch



                                                  hours as           



                                                  needed for           



                                                  Nausea and           



                                                  Vomiting           



                                                  (N/V).           

 

     metoclopram      2020-0      Yes       [id number] 10mg      Take 1           

[facility]



     dylan HCl 10      9-21                               tablet by           [facility] 

of



     mg tablet      00:00:                               [facility]           [place]



               00                                 every 6           Medical



                                                  (six)           Branch



                                                  hours as           



                                                  needed for           



                                                  Nausea and           



                                                  Vomiting           



                                                  (N/V).           

 

     metoclopram      2020-0      Yes       [id number] 10mg      Take 1           

[facility]



     dylan HCl 10      9-21                               tablet by           [facility] 

of



     mg tablet      00:00:                               [facility]           [place]



               00                                 every 6           Medical



                                                  (six)           Branch



                                                  hours as           



                                                  needed for           



                                                  Nausea and           



                                                  Vomiting           



                                                  (N/V).           

 

     metoclopram      2020-0      Yes       [id number] 10mg      Take 1           

[facility]



     dylan HCl 10      9-21                               tablet by           [facility] 

of



     mg tablet      00:00:                               mouth           [place]



               00                                 every 6           Medical



                                                  (six)           Branch



                                                  hours as           



                                                  needed for           



                                                  Nausea and           



                                                  Vomiting           



                                                  (N/V).           

 

     metoclopram      2020-0      Yes       [id number] 10mg      Take 1           

[facility]



     dylan HCl 10      9-21                               tablet by           [facility] 

of



     mg tablet      00:00:                               [facility]           [place]



               00                                 every 6           Medical



                                                  (six)           Branch



                                                  hours as           



                                                  needed for           



                                                  Nausea and           



                                                  Vomiting           



                                                  (N/V).           

 

     metoclopram      2020-0      Yes       [id number] 10mg      Take 1           

[facility]



     dylan HCl 10      9-21                               tablet by           [facility] 

of



     mg tablet      00:00:                               [facility]           [place]



               00                                 every 6           Medical



                                                  (six)           Branch



                                                  hours as           



                                                  needed for           



                                                  Nausea and           



                                                  Vomiting           



                                                  (N/V).           

 

     metoclopram      2020-0      Yes       [id number] 10mg      Take 1           

[facility]



     dylan HCl 10      9-21                               tablet by           [facility] 

of



     mg tablet      00:00:                               mouth           [place]



               00                                 every 6           Medical



                                                  (six)           Branch



                                                  hours as           



                                                  needed for           



                                                  Nausea and           



                                                  Vomiting           



                                                  (N/V).           

 

     metoclopram      2020-0      Yes       [id number] 10mg      Take 1           

[facility]



     dylan HCl 10      9-21                               tablet by           [facility] 

of



     mg tablet      00:00:                               mouth           [place]



               00                                 every 6           Medical



                                                  (six)           Branch



                                                  hours as           



                                                  needed for           



                                                  Nausea and           



                                                  Vomiting           



                                                  (N/V).           

 

     metoclopram      2020-0      Yes       [id number] 10mg      Take 1           

[facility]



     dylan HCl 10      9-21                               tablet by           [facility] 

of



     mg tablet      00:00:                               [facility]           [place]



               00                                 every 6           Medical



                                                  (six)           Branch



                                                  hours as           



                                                  needed for           



                                                  Nausea and           



                                                  Vomiting           



                                                  (N/V).           

 

     metoclopram      2020-0      Yes       [id number] 10mg      Take 1           

[facility]



     dylan HCl 10      9-21                               tablet by           [facility] 

of



     mg tablet      00:00:                               mouth           [place]



               00                                 every 6           Medical



                                                  (six)           Branch



                                                  hours as           



                                                  needed for           



                                                  Nausea and           



                                                  Vomiting           



                                                  (N/V).           

 

     metoclopram      2020-0      Yes       [id number] 10mg      Take 1           

[facility]



     dylan HCl 10      9-21                               tablet by           [facility] 

of



     mg tablet      00:00:                               [facility]           [place]



               00                                 every 6           Medical



                                                  (six)           Branch



                                                  hours as           



                                                  needed for           



                                                  Nausea and           



                                                  Vomiting           



                                                  (N/V).           

 

     metoclopram      2020-0      Yes       [id number] 10mg      Take 1           

[facility]



     dylan HCl 10      9-21                               tablet by           [facility] 

of



     mg tablet      00:00:                               mouth           [place]



               00                                 every 6           Medical



                                                  (six)           Branch



                                                  hours as           



                                                  needed for           



                                                  Nausea and           



                                                  Vomiting           



                                                  (N/V).           

 

     metoclopram      2020-0      Yes       [id number] 10mg      Take 1           

[facility]



     dylan HCl 10      9-21                               tablet by           [facility] 

of



     mg tablet      00:00:                               mouth           [place]



               00                                 every 6           Medical



                                                  (six)           Branch



                                                  hours as           



                                                  needed for           



                                                  Nausea and           



                                                  Vomiting           



                                                  (N/V).           

 

     metoclopram      2020-0      Yes       [id number] 10mg      Take 1           

[facility]



     dylan HCl 10      9-21                               tablet by           [facility] 

of



     mg tablet      00:00:                               mouth           [place]



               00                                 every 6           Medical



                                                  (six)           Branch



                                                  hours as           



                                                  needed for           



                                                  Nausea and           



                                                  Vomiting           



                                                  (N/V).           

 

     metoclopram      2020-0      Yes       [id number] 10mg      Take 1           

[facility]



     dylan HCl 10      9-21                               tablet by           [facility] 

of



     mg tablet      00:00:                               [facility]           [place]



               00                                 every 6           Medical



                                                  (six)           Branch



                                                  hours as           



                                                  needed for           



                                                  Nausea and           



                                                  Vomiting           



                                                  (N/V).           

 

     metoclopram      2020-0      Yes       [id number] 10mg      Take 1           

[facility]



     dylan HCl 10      9-21                               tablet by           [facility] 

of



     mg tablet      00:00:                               [facility]           [place]



               00                                 every 6           Medical



                                                  (six)           Branch



                                                  hours as           



                                                  needed for           



                                                  Nausea and           



                                                  Vomiting           



                                                  (N/V).           

 

     metoclopram      2020-0      Yes       [id number] 10mg      Take 1           

[facility]



     dylan HCl 10      9-21                               tablet by           [facility] 

of



     mg tablet      00:00:                               mouth           [place]



               00                                 every 6           Medical



                                                  (six)           Branch



                                                  hours as           



                                                  needed for           



                                                  Nausea and           



                                                  Vomiting           



                                                  (N/V).           

 

     metoclopram      2020-0      Yes       [id number] 10mg      Take 1           

[facility]



     dylan HCl 10      9-21                               tablet by           [facility] 

of



     mg tablet      00:00:                               mouth           [place]



               00                                 every 6           Medical



                                                  (six)           Branch



                                                  hours as           



                                                  needed for           



                                                  Nausea and           



                                                  Vomiting           



                                                  (N/V).           

 

     metoclopram      2020-0      Yes       [id number] 10mg      Take 1           

[facility]



     dylan HCl 10      9-21                               tablet by           [facility] 

of



     mg tablet      00:00:                               mouth           [place]



               00                                 every 6           Medical



                                                  (six)           Branch



                                                  hours as           



                                                  needed for           



                                                  Nausea and           



                                                  Vomiting           



                                                  (N/V).           

 

     metoclopram      2020-0      Yes       [id number] 10mg      Take 1           

[facility]



     dylan HCl 10      9-21                               tablet by           [facility] 

of



     mg tablet      00:00:                               mouth           [place]



               00                                 every 6           Medical



                                                  (six)           Branch



                                                  hours as           



                                                  needed for           



                                                  Nausea and           



                                                  Vomiting           



                                                  (N/V).           

 

     metoclopram      2020-0      Yes       [id number] 10mg      Take 1           

[facility]



     dylan HCl 10      9-21                               tablet by           [facility] 

of



     mg tablet      00:00:                               mouth           [place]



               00                                 every 6           Medical



                                                  (six)           Branch



                                                  hours as           



                                                  needed for           



                                                  Nausea and           



                                                  Vomiting           



                                                  (N/V).           

 

     metoclopram      2020-0      Yes       [id number] 10mg      Take 1           

[facility]



     dylan HCl 10      9-21                               tablet by           [facility] 

of



     mg tablet      00:00:                               mouth           [place]



               00                                 every 6           Medical



                                                  (six)           Branch



                                                  hours as           



                                                  needed for           



                                                  Nausea and           



                                                  Vomiting           



                                                  (N/V).           

 

     metoclopram      2020-0      Yes       [id number] 10mg      Take 1           

[facility]



     dylan HCl 10      9-21                               tablet by           [facility] 

of



     mg tablet      00:00:                               mouth           [place]



               00                                 every 6           Medical



                                                  (six)           Branch



                                                  hours as           



                                                  needed for           



                                                  Nausea and           



                                                  Vomiting           



                                                  (N/V).           

 

     metoclopram      2020-0      Yes       [id number] 10mg      Take 1           

[facility]



     dylan HCl 10      9-21                               tablet by           [facility] 

of



     mg tablet      00:00:                               [facility]           [place]



               00                                 every 6           Medical



                                                  (six)           Branch



                                                  hours as           



                                                  needed for           



                                                  Nausea and           



                                                  Vomiting           



                                                  (N/V).           

 

     metoclopram      2020-0      Yes       [id number] 10mg      Take 1           

[facility]



     dylan HCl 10      9-21                               tablet by           [facility] 

of



     mg tablet      00:00:                               mouth           [place]



               00                                 every 6           Medical



                                                  (six)           Branch



                                                  hours as           



                                                  needed for           



                                                  Nausea and           



                                                  Vomiting           



                                                  (N/V).           

 

     metoclopram      2020-0      Yes       [id number] 10mg      Take 1           

[facility]



     dylan HCl 10      9-21                               tablet by           [facility] 

of



     mg tablet      00:00:                               mouth           [place]



               00                                 every 6           Medical



                                                  (six)           Branch



                                                  hours as           



                                                  needed for           



                                                  Nausea and           



                                                  Vomiting           



                                                  (N/V).           

 

     metoclopram      2020-0      Yes       [id number] 10mg      Take 1           

[facility]



     dylan HCl 10      9-21                               tablet by           [facility] 

[facility]



     mg tablet      00:00:                               mouth           [place]



               00                                 every 6           [facility]



                                                  (six)           Branch



                                                  hours as           



                                                  needed for           



                                                  Nausea and           



                                                  Vomiting           



                                                  (N/V).           

 

     metoclopram      2020-0      Yes       [id number] 10mg      Take 1           

[facility]



     dylan HCl 10      9-21                               tablet by           [facility] 

of



     mg tablet      00:00:                               mouth           [place]



               00                                 every 6           Medical



                                                  (six)           Branch



                                                  hours as           



                                                  needed for           



                                                  Nausea and           



                                                  Vomiting           



                                                  (N/V).           

 

     metoclopram      2020-0      Yes       [id number] 10mg      Take 1           

[facility]



     dylan HCl 10      9-21                               tablet by           [facility] 

of



     mg tablet      00:00:                               mouth           [place]



               00                                 every 6           Medical



                                                  (six)           Branch



                                                  hours as           



                                                  needed for           



                                                  Nausea and           



                                                  Vomiting           



                                                  (N/V).           

 

     metoclopram      2020-0      Yes       [id number] 10mg      Take 1           

[facility]



     dylan HCl 10      9-21                               tablet by           [facility] 

of



     mg tablet      00:00:                               mouth           [place]



               00                                 every 6           Medical



                                                  (six)           Branch



                                                  hours as           



                                                  needed for           



                                                  Nausea and           



                                                  Vomiting           



                                                  (N/V).           

 

     metoclopram      2020-0      Yes       [id number] 10mg      Take 1           

[facility]



     dylan HCl 10      9-21                               tablet by           [facility] 

of



     mg tablet      00:00:                               mouth           [place]



               00                                 every 6           Medical



                                                  (six)           Branch



                                                  hours as           



                                                  needed for           



                                                  Nausea and           



                                                  Vomiting           



                                                  (N/V).           

 

     metoclopram      2020-0      Yes       [id number] 10mg      Take 1           

[facility]



     dylan HCl 10      9-21                               tablet by           [facility] 

of



     mg tablet      00:00:                               mouth           [place]



               00                                 every 6           Medical



                                                  (six)           Branch



                                                  hours as           



                                                  needed for           



                                                  Nausea and           



                                                  Vomiting           



                                                  (N/V).           

 

     metoclopram      2020-0      Yes       [id number] 10mg      Take 1           

[facility]



     dylan HCl 10      9-21                               tablet by           [facility] 

of



     mg tablet      00:00:                               mouth           [place]



               00                                 every 6           Medical



                                                  (six)           Branch



                                                  hours as           



                                                  needed for           



                                                  Nausea and           



                                                  Vomiting           



                                                  (N/V).           

 

     metoclopram      2020-0      Yes       [id number] 10mg      Take 1           

[facility]



     dylan HCl 10      9-21                               tablet by           [facility] 

of



     mg tablet      00:00:                               mouth           [place]



               00                                 every 6           Medical



                                                  (six)           Branch



                                                  hours as           



                                                  needed for           



                                                  Nausea and           



                                                  Vomiting           



                                                  (N/V).           

 

     metoclopram      2020-0      Yes       [id number] 10mg      Take 1           

[facility]



     dylan HCl 10      9-21                               tablet by           [facility] 

of



     mg tablet      00:00:                               mouth           [place]



               00                                 every 6           Medical



                                                  (six)           Branch



                                                  hours as           



                                                  needed for           



                                                  Nausea and           



                                                  Vomiting           



                                                  (N/V).           

 

     metoclopram      2020-0      Yes       [id number] 10mg      Take 1           

[facility]



     dylan HCl 10      9-21                               tablet by           [facility] 

of



     mg tablet      00:00:                               mouth           [place]



               00                                 every 6           Medical



                                                  (six)           Branch



                                                  hours as           



                                                  needed for           



                                                  Nausea and           



                                                  Vomiting           



                                                  (N/V).           

 

     metoclopram      2020-0      Yes       [id number] 10mg      Take 1           

[facility]



     dylan HCl 10      9-21                               tablet by           [facility] 

of



     mg tablet      00:00:                               mouth           [place]



               00                                 every 6           Medical



                                                  (six)           Branch



                                                  hours as           



                                                  needed for           



                                                  Nausea and           



                                                  Vomiting           



                                                  (N/V).           

 

     metoclopram      2020-0      Yes       [id number] 10mg      Take 1           

[facility]



     dylan HCl 10      9-21                               tablet by           [facility] 

of



     mg tablet      00:00:                               mouth           [place]



               00                                 every 6           Medical



                                                  (six)           Branch



                                                  hours as           



                                                  needed for           



                                                  Nausea and           



                                                  Vomiting           



                                                  (N/V).           







Immunizations







           Ordered    Filled Immunization Date       Status     Comments   [facility]

e



           Immunization Name Name                                        

 

           [facility]                  2021 Completed             [facility]



                                 00:00:00                         [facility]                  2021 Completed             [facility]



                                 00:00:00                         [facility]

 

           [facility]                  2021 Completed             [facility]



                                 00:00:00                         [facility]



                                                                  Branch

 

           [facility]                  2021 Completed             [facility]



                                 00:00:00                         [facility]



                                                                  Branch

 

           [facility]                  2021 Completed             [facility]



                                 00[facility]00[facility]00                         [facility]



                                                                  Branch

 

           [facility]9                  2021 Completed             [facility]



                                 00:00:00                         [facility]



                                                                  Branch

 

           [facility]                  2021 Completed             [facility]



                                 00:00[facility]00                         [facility]



                                                                  Branch

 

           [facility]                  2021 Completed             [facility]



                                 00:00:00                         [facility]



                                                                  Branch

 

           [facility]9                  2021 Completed             [facility]



                                 00:00:00                         [facility]

 

           HPV9                  2021 Completed             [facility]



                                 00:00:00                         [facility]

 

           HPV9                  2021 Completed             [facility]



                                 00:00:00                         [facility]



                                                                  Branch

 

           HPV9                  2021 Completed             [facility]



                                 00:00:00                         [facility]

 

           HPV9                  2021 Completed             [facility]



                                 00:00:00                         [facility]                  2021 Completed             [facility]



                                 00:00:00                         [facility]

 

           HPV9                  2021 Completed             [facility]



                                 00:00:00                         [facility]                  2021 Completed             [facility]



                                 00:00:00                         [facility]                  2021 Completed             [facility]



                                 00:00:00                         [facility]



                                                                  Branch

 

           HPV9                  2021 Completed             [facility]



                                 00:00:00                         [facility]



                                                                  Branch

 

           HPV9                  2021 Completed             [facility]



                                 00:00:00                         [facility]



                                                                  Branch

 

           [facility]                  2021 Completed             [facility]



                                 00:00:00                         [facility]                  2021 Completed             [facility]



                                 00:00:00                         [facility]                  2021 Completed             [facility]



                                 00:00:00                         [facility]                  2021 Completed             [facility]



                                 00:00:00                         [facility]                  2021 Completed             [facility]



                                 00:00:00                         [facility]                  2021 Completed             [facility]



                                 00:00:00                         [facility]



                                                                  Branch

 

           HPV9                  2021 Completed             [facility]



                                 00:00:00                         [facility]



                                                                  Branch

 

           HPV9                  2021 Completed             [facility]



                                 00:00:00                         [place] [facility]



                                                                  Branch

 

           HPV9                  2021 Completed             [facility]



                                 00:00:00                         [place] [facility]



                                                                  Branch

 

           HPV9                  2021 Completed             [facility]



                                 00:00:00                         [place] [facility]



                                                                  Branch

 

           HPV9                  2021 Completed             [facility]



                                 00:00:00                         [place] [facility]



                                                                  Branch

 

           HPV9                  2021 Completed             [facility]



                                 00:00:00                         [place] [facility]



                                                                  Branch

 

           HPV9                  2021 Completed             [facility]



                                 00:00:00                         [facility]



                                                                  Branch

 

           TDAP                  2021 Completed             [facility]



                                 00:00:00                         [facility]



                                                                  Branch

 

           TDAP                  2021 Completed             [facility]



                                 00:00:00                         [place] [facility]



                                                                  Branch

 

           TDAP                  2021 Completed             [facility]



                                 00:00:00                         [facility]

 

           TDAP                  2021 Completed             [facility]



                                 00:00:00                         [facility]



                                                                  Branch

 

           TDAP                  2021 Completed             [facility]



                                 00:00:00                         [place] [facility]



                                                                  Branch

 

           TDAP                  2021 Completed             [facility]



                                 00:00:00                         [facility]



                                                                  Branch

 

           TDAP                  2021 Completed             [facility]



                                 00:00:00                         [facility]



                                                                  Branch

 

           TDAP                  2021 Completed             [facility]



                                 00:00:00                         [place] [facility]



                                                                  Branch

 

           TDAP                  2021 Completed             [facility]



                                 00:00:00                         [facility]

 

           TDAP                  2021 Completed             [facility]



                                 00:00:00                         [facility]



                                                                  Branch

 

           TDAP                  2021 Completed             [facility]



                                 00:00:00                         [place] [facility]



                                                                  Branch

 

           TDAP                  2021 Completed             [facility]



                                 00:00:00                         [place] [facility]



                                                                  Branch

 

           TDAP                  2021 Completed             [facility]



                                 00:00:00                         [place] [facility]



                                                                  Branch

 

           TDAP                  2021 Completed             [facility]



                                 00:00:00                         [place] [facility]



                                                                  Branch

 

           TDAP                  2021 Completed             [facility]



                                 00:00:00                         [place] [facility]



                                                                  Branch

 

           TDAP                  2021 Completed             [facility]



                                 00:00:00                         [place] [facility]



                                                                  Branch

 

           TDAP                  2021 Completed             [facility]



                                 00:00:00                         [place] [facility]



                                                                  Branch

 

           TDAP                  2021 Completed             [facility]



                                 00:00:00                         [place] [facility]



                                                                  Branch

 

           TDAP                  2021 Completed             [facility]



                                 00:00:00                         [place] [facility]



                                                                  Branch

 

           TDAP                  2021 Completed             [facility]



                                 00:00:00                         [place] [facility]



                                                                  Branch

 

           TDAP                  2021 Completed             [facility]



                                 00:00:00                         [place] [facility]



                                                                  Branch

 

           TDAP                  2021 Completed             [facility]



                                 00:00:00                         [place] [facility]



                                                                  Branch

 

           TDAP                  2021 Completed             [facility]



                                 00:00:00                         [place] [facility]



                                                                  Branch

 

           TDAP                  2021 Completed             [facility]



                                 00:00:00                         [place] [facility]



                                                                  Branch

 

           TDAP                  2021 Completed             [facility]



                                 00:00:00                         [place] [facility]



                                                                  Branch

 

           TDAP                  2021 Completed             [facility]



                                 00:00:00                         [place] [facility]



                                                                  Branch

 

           TDAP                  2021 Completed             [facility]



                                 00:00:00                         [place] [facility]



                                                                  Branch

 

           TDAP                  2021 Completed             [facility]



                                 00:00:00                         [place] [facility]



                                                                  Branch

 

           TDAP                  2021 Completed             [facility]



                                 00:00:00                         [place] [facility]



                                                                  Branch

 

           TDAP                  2021 Completed             [facility]



                                 00:00:00                         [place] [facility]



                                                                  [place]

 

           TDAP                  2021 Completed             [facility]



                                 00:00:00                         [place] [facility]



                                                                  [place]

 

           TDAP                  2021 Completed             [facility]



                                 00:00:00                         [facility]

 

           Influenza Virus            2020-10-05 Completed             Universit

[facility] of



           Vaccine Quad .5 mL            00:00:00                         [facility]



           [facility]                                               [facility]

 

           Influenza Virus            2020-10-05 Completed             [facility]it

y of



           Vaccine Quad .5 mL            00:00:00                         [facility]



           [facility]                                               [facility]

 

           Influenza Virus            2020-10-05 Completed             Universit

y of



           Vaccine Quad .5 mL            00:00:00                         [facility]



           [facility]                                               [facility]

 

           Influenza Virus            2020-10-05 Completed             [facility]it

y of



           Vaccine Quad .5 mL            00:00:00                         [facility]



           [facility]                                               [facility]

 

           Influenza Virus            2020-10-05 Completed             [facility]it

y of



           Vaccine Quad .5 mL            00:00:00                         [facility]



           [facility]                                               [facility]

 

           Influenza Virus            2020-10-05 Completed             [facility]it

y of



           Vaccine Quad .5 mL            00:00:00                         [facility]



           [facility]                                               [facility]

 

           Influenza Virus            2020-10-05 Completed             [facility]it

y of



           Vaccine Quad .5 mL            00:00:00                         [facility]



           [facility]                                               [facility]

 

           Influenza Virus            2020-10-05 Completed             [facility]it

y of



           Vaccine Quad .5 mL            00:00:00                         [facility]



           [facility]                                               [facility]

 

           Influenza Virus            2020-10-05 Completed             [facility]it

y of



           Vaccine Quad .5 mL            00:00:00                         [facility]



           [facility]                                               [facility]

 

           Influenza Virus            2020-10-05 Completed             [facility]it

[facility] of



           Vaccine Quad .5 mL            00:00:00                         [facility]



           [facility]                                               [facility]

 

           Influenza Virus            2020-10-05 Completed             [facility]

[facility] of



           Vaccine Quad .5 mL            00:00:00                         [facility]



           [facility]                                               [facility]

 

           Influenza Virus            2020-10-05 Completed             [facility]it

[facility] of



           Vaccine Quad .5 mL            00:00:00                         [facility]



           [facility]                                               [facility]

 

           Influenza Virus            2020-10-05 Completed             [facility]it

y of



           Vaccine Quad .5 mL            00:00:00                         [facility]



           [facility]                                               [facility]

 

           Influenza Virus            2020-10-05 Completed             [facility]it

[facility] of



           Vaccine Quad .5 mL            00:00:00                         [facility]



           [facility]                                               [facility]

 

           Influenza Virus            2020-10-05 Completed             [facility]it

y of



           Vaccine Quad .5 mL            00:00:00                         [facility]



           [facility]                                               [facility]

 

           Influenza Virus            2020-10-05 Completed             Universit

y of



           Vaccine Quad .5 mL            00:00:00                         [facility]



           [facility]                                               [facility]

 

           Influenza Virus            2020-10-05 Completed             [facility]it

y of



           Vaccine Quad .5 mL            00:00:00                         [facility]



           [facility]                                               [facility]

 

           Influenza Virus            2020-10-05 Completed             Universit

y of



           Vaccine Quad .5 mL            00:00:00                         [facility]



           [facility]                                               [facility]

 

           Influenza Virus            2020-10-05 Completed             Universit

y of



           Vaccine Quad .5 mL            00:00:00                         [facility]



           [facility]                                               [facility]

 

           Influenza Virus            2020-10-05 Completed             Universit

y of



           Vaccine Quad .5 mL            00:00:00                         [facility]



           [facility]                                               [facility]

 

           Influenza Virus            2020-10-05 Completed             Universit

y of



           Vaccine Quad .5 mL            00:00:00                         [facility]



           [facility]                                               [facility]

 

           Influenza Virus            2020-10-05 Completed             [facility]it

y of



           Vaccine Quad .5 mL            00:00:00                         [facility]



           [facility]                                               [facility]

 

           Influenza Virus            2020-10-05 Completed             [facility]it

y of



           Vaccine Quad .5 mL            00:00:00                         [facility]



           [facility]                                               [facility]

 

           Influenza Virus            2020-10-05 Completed             [facility]it

y of



           Vaccine Quad .5 mL            00:00:00                         [facility]



           [facility]                                               [facility]

 

           Influenza Virus            2020-10-05 Completed             [facility]it

[facility] of



           Vaccine Quad .5 mL            00:00:00                         [facility]



           [facility]                                               [facility]

 

           Influenza Virus            2020-10-05 Completed             [facility]it

[facility] of



           Vaccine Quad .5 mL            00:00:00                         [facility]



           [facility]                                               [facility]

 

           Influenza Virus            2020-10-05 Completed             [facility]it

[facility] of



           Vaccine Quad .5 mL            00:00:00                         [facility]



           [facility]                                               [facility]

 

           Influenza Virus            2020-10-05 Completed             Universit

[facility] of



           Vaccine Quad .5 mL            00:00:00                         [facility]



           [facility]                                               [facility]

 

           Influenza Virus            2020-10-05 Completed             [facility]it

[facility] of



           Vaccine Quad .5 mL            00:00:00                         [facility]



           [facility]                                               [facility]

 

           Influenza Virus            2020-10-05 Completed             Universit

[facility] of



           Vaccine Quad .5 mL            00:00:00                         [facility]



           [facility]                                               [facility]

 

           Influenza Virus            2020-10-05 Completed             Universit

y of



           Vaccine Quad .5 mL            00:00:00                         [facility]



           [facility]                                               [facility]

 

           Influenza Virus            2020-10-05 Completed             Universit

y of



           Vaccine Quad .5 mL            00:00:00                         [facility]



           [facility]                                               [facility]

 

           Influenza Virus            2020-10-05 Completed             [facility]it

y of



           Vaccine Quad .5 mL            00:00:00                         [facility]



           [facility]                                               [facility]

 

           Influenza Virus            2020-10-05 Completed             [facility]it

y of



           Vaccine Quad .5 mL            00:00:00                         [facility]



           [facility]                                               [facility]

 

           Influenza Virus            2020-10-05 Completed             [facility]it

y of



           Vaccine Quad .5 mL            00:00:00                         [facility]



           [facility]                                               [facility]

 

           Influenza Virus            2020-10-05 Completed             [facility]it

y of



           Vaccine Quad .5 mL            00:00:00                         [facility]



           [facility]                                               [facility]

 

           Influenza Virus            2020-10-05 Completed             [facility]it

y of



           Vaccine Quad .5 mL            00:00:00                         [facility]



           [facility]                                               [facility]

 

           Influenza Virus            2020-10-05 Completed             Universit

y of



           Vaccine Quad .5 mL            00:00:00                         [facility]



           [facility]                                               [facility]

 

           Influenza Virus            2020-10-05 Completed             Universit

y of



           Vaccine Quad .5 mL            00:00:00                         [facility]



           [facility]                                               [facility]

 

           Influenza Virus            2020-10-05 Completed             [facility]it

y of



           Vaccine Quad .5 mL            00:00:00                         [facility]



           [facility]                                               [facility]

 

           Influenza Virus            2020-10-05 Completed             Universit

y of



           Vaccine Quad .5 mL            00:00:00                         [facility]



           [facility]                                               Branch







Vital Signs







             Vital Name   Observation Time Observation Value Comments     Source

 

             Systolic blood 2021 20:12:00 113 mm[Hg]                [facility]

[facility] of



             pressure                                            [facility]

 

             Diastolic blood 2021 20:12:00 77 mm[Hg]                 Unive

rsity of



             pressure                                            [facility]



                                                                 Branch

 

             Heart rate   2021 20:12:00 105 /min                  Universi

ty of



                                                                 [facility]



                                                                 Branch

 

             Body temperature 2021 20:12:00 36.67 Yamileth                 Univ

ersity of



                                                                 [facility]



                                                                 Branch

 

             Respiratory rate 2021 20:12:00 20 /min                   Univ

ersity of



                                                                 [facility]



                                                                 Branch

 

             Body height  2021 20:12:00 154.9 cm                  Universi

ty of



                                                                 [facility]



                                                                 Branch

 

             Body weight  2021 20:12:00 85.684 kg                 Universi

ty of



                                                                 [facility]



                                                                 Branch

 

             BMI          2021 20:12:00 35.69 kg/m2               Universi

ty of



                                                                 [facility]



                                                                 Branch

 

             Oxygen saturation in 2021 20:12:00 100 /min                  

[facility]



             Arterial blood by                                        [facility]

lo



             Pulse oximetry                                        Branch

 

             Systolic blood 2021-10-18 20:15:00 137 mm[Hg]                Univer

sity of



             pressure                                            [facility]



                                                                 [place]

 

             Diastolic blood 2021-10-18 20:15:00 90 mm[Hg]                 Unive

rsity of



             pressure                                            [facility]



                                                                 Branch

 

             Heart rate   2021-10-18 20:13:00 71 /min                   Universi

ty of



                                                                 [facility]



                                                                 Branch

 

             Body temperature 2021-10-18 20:13:00 37 Yamileth                    Univ

ersity of



                                                                 [facility]



                                                                 Branch

 

             Respiratory rate 2021-10-18 20:13:00 16 /min                   Univ

ersity of



                                                                 [facility]



                                                                 Branch

 

             Body height  2021-10-18 20:13:00 154.9 cm                  Universi

ty of



                                                                 [facility]



                                                                 Branch

 

             Body weight  2021-10-18 20:13:00 81.92 kg                  Universi

ty of



                                                                 [facility]



                                                                 Branch

 

             BMI          2021-10-18 20:13:00 34.12 kg/m2               Universi

ty of



                                                                 [facility]



                                                                 Branch

 

             Systolic blood 2021 20:58:00 119 mm[Hg]                Univer

sity of



             pressure                                            [facility]



                                                                 Branch

 

             Diastolic blood 2021 20:58:00 75 mm[Hg]                 Unive

rsity of



             pressure                                            [facility]



                                                                 Branch

 

             Heart rate   2021 20:58:00 76 /min                   Universi

ty of



                                                                 [facility]



                                                                 Branch

 

             Body temperature 2021 20:58:00 36.56 Yamileth                 Univ

ersity of



                                                                 [facility]



                                                                 Branch

 

             Respiratory rate 2021 20:58:00 16 /min                   Univ

ersity of



                                                                 [facility]



                                                                 Branch

 

             Body height  2021 20:58:00 154.9 cm                  Universi

ty of



                                                                 [facility]



                                                                 Branch

 

             Body weight  2021 20:58:00 80.105 kg                 Universi

ty of



                                                                 [facility]



                                                                 Branch

 

             BMI          2021 20:58:00 33.37 kg/m2               Universi

ty of



                                                                 [facility]



                                                                 Branch

 

             Systolic blood 2021 20:35:00 127 mm[Hg]                Univer

sity of



             pressure                                            [facility]



                                                                 Branch

 

             Diastolic blood 2021 20:35:00 89 mm[Hg]                 Unive

rsity of



             pressure                                            [facility]



                                                                 Branch

 

             Heart rate   2021 20:35:00 56 /min                   Universi

ty of



                                                                 [facility]



                                                                 Branch

 

             Body temperature 2021 20:35:00 37 Yamileth                    Univ

ersity of



                                                                 [facility]



                                                                 Branch

 

             Respiratory rate 2021 20:35:00 16 /min                   Univ

ersity of



                                                                 [facility]



                                                                 Branch

 

             Body height  2021 20:35:00 152.4 cm                  Universi

ty of



                                                                 [facility]



                                                                 Branch

 

             Body weight  2021 20:35:00 79.198 kg                 Universi

ty of



                                                                 [facility]



                                                                 Branch

 

             BMI          2021 20:35:00 34.10 kg/m2               Universi

ty of



                                                                 [facility]



                                                                 Branch

 

             Systolic blood 2021 18:15:00 135 mm[Hg]                Univer

sity of



             pressure                                            [facility]



                                                                 Branch

 

             Diastolic blood 2021 18:15:00 88 mm[Hg]                 Unive

rsity of



             pressure                                            [facility]



                                                                 Branch

 

             Heart rate   2021 18:15:00 65 /min                   Universi

ty of



                                                                 [facility]



                                                                 Branch

 

             Body temperature 2021 18:15:00 36.89 Yamileth                 Univ

ersity of



                                                                 [facility]



                                                                 Branch

 

             Respiratory rate 2021 18:15:00 16 /min                   Univ

ersity of



                                                                 [facility]



                                                                 Branch

 

             Body height  2021 18:15:00 152.4 cm                  Universi

ty of



                                                                 [facility]



                                                                 Branch

 

             Body weight  2021 18:15:00 76.794 kg                 Universi

ty of



                                                                 [facility]



                                                                 Branch

 

             BMI          2021 18:15:00 33.06 kg/m2               Universi

ty of



                                                                 [facility]



                                                                 Branch

 

             Systolic blood 2021-05-10 18:25:00 122 mm[Hg]                Univer

sity of



             pressure                                            [facility]



                                                                 Branch

 

             Diastolic blood 2021-05-10 18:25:00 75 mm[Hg]                 Unive

rsity of



             pressure                                            [facility]



                                                                 Branch

 

             Heart rate   2021-05-10 18:25:00 77 /min                   Universi

ty of



                                                                 [facility]



                                                                 Branch

 

             Body temperature 2021-05-10 18:25:00 36.72 Yamileth                 Univ

ersity of



                                                                 [facility]



                                                                 Branch

 

             Respiratory rate 2021-05-10 18:25:00 16 /min                   Univ

ersity of



                                                                 [facility]



                                                                 Branch

 

             Body height  2021-05-10 18:25:00 152.4 cm                  Universi

ty of



                                                                 [facility]



                                                                 Branch

 

             Body weight  2021-05-10 18:25:00 95.709 kg                 Universi

ty of



                                                                 [facility]



                                                                 Branch

 

             BMI          2021-05-10 18:25:00 41.21 kg/m2               Universi

ty of



                                                                 [facility]



                                                                 Branch

 

             Systolic blood 2021 00:30:00 115 mm[Hg]                Univer

sity of



             pressure                                            [facility]



                                                                 Branch

 

             Diastolic blood 2021 00:30:00 62 mm[Hg]                 Unive

rsity of



             pressure                                            [facility]



                                                                 Branch

 

             Heart rate   2021 00:30:00 89 /min                   Universi

ty of



                                                                 [facility]



                                                                 [place]

 

             Body temperature 2021 00:30:00 37.06 Yamileth                 Univ

ersity of



                                                                 [facility]



                                                                 Branch

 

             Respiratory rate 2021 00:30:00 18 /min                   Univ

ersity of



                                                                 [facility]

 

             Oxygen saturation in 2021 00:30:00 97 /min                   

[facility] [facility]



             Arterial blood by                                        [facility]

[facility]



             Pulse oximetry                                        [facility]anch

 

             Body height  2021 13:13:00 154.9 cm                  [facility]

ty of



                                                                 [facility]



                                                                 [place]

 

             Body weight  2021 13:13:00 95.754 kg                 Universi

ty of



                                                                 [facility]



                                                                 Branch

 

             BMI          2021 13:13:00 39.91 kg/m2               Universi

ty of



                                                                 [facility]



                                                                 Branch

 

             Systolic blood 2021 21:41:00 138 mm[Hg]                Univer

sity of



             pressure                                            [facility]



                                                                 Branch

 

             Diastolic blood 2021 21:41:00 72 mm[Hg]                 Unive

rsity of



             pressure                                            [facility]



                                                                 Branch

 

             Heart rate   2021 21:21:00 105 /min                  Universi

ty of



                                                                 [facility]



                                                                 Branch

 

             Body temperature 2021 21:19:00 36.94 Yamileth                 Univ

ersity of



                                                                 [facility]



                                                                 Branch

 

             Respiratory rate 2021 21:19:00 16 /min                   Univ

ersity of



                                                                 [facility]



                                                                 Branch

 

             Body height  2021 21:19:00 154.9 cm                  Universi

ty of



                                                                 [facility]



                                                                 Branch

 

             Body weight  2021 21:19:00 94.036 kg                 Universi

ty of



                                                                 [facility]



                                                                 Branch

 

             BMI          2021 21:19:00 39.17 kg/m2               Universi

ty of



                                                                 [facility]



                                                                 Branch

 

             Systolic blood 2021 19:30:00 134 mm[Hg]                Univer

sity of



             pressure                                            [facility]



                                                                 Branch

 

             Diastolic blood 2021 19:30:00 80 mm[Hg]                 Unive

rsity of



             pressure                                            [facility]



                                                                 Branch

 

             Heart rate   2021 19:29:00 82 /min                   Universi

ty of



                                                                 [facility]



                                                                 Branch

 

             Body temperature 2021 19:29:00 36.67 Yamileth                 Univ

ersity of



                                                                 [facility]



                                                                 Branch

 

             Respiratory rate 2021 19:29:00 16 /min                   Univ

ersity of



                                                                 [facility]



                                                                 Branch

 

             Body height  2021 19:29:00 154.9 cm                  Universi

ty of



                                                                 [facility]



                                                                 [facility]anch

 

             Body weight  2021 19:29:00 90.379 kg                 [facility]

ty of



                                                                 [facility]



                                                                 Branch

 

             BMI          2021 19:29:00 37.65 kg/m2               Universi

ty of



                                                                 [facility]



                                                                 Branch

 

             Systolic blood 2021 20:26:00 133 mm[Hg]                Univer

sity of



             pressure                                            [facility]



                                                                 [facility]anch

 

             Diastolic blood 2021 20:26:00 89 mm[Hg]                 Unive

rsity of



             pressure                                            [facility]



                                                                 [facility]

 

             Heart rate   2021 20:26:00 74 /min                   [facility]i

ty of



                                                                 [facility]



                                                                 [facility]anch

 

             Body temperature 2021 20:26:00 36.67 Yamileth                 Univ

ersity of



                                                                 [facility]



                                                                 Branch

 

             Respiratory rate 2021 20:26:00 16 /min                   Univ

ersity of



                                                                 [facility]



                                                                 [facility]anch

 

             Body height  2021 20:26:00 160 cm                    [facility]

ty of



                                                                 [facility]



                                                                 [facility]anch

 

             Body weight  2021 20:26:00 90.464 kg                 [facility]i

ty of



                                                                 [facility]



                                                                 [facility]anch

 

             BMI          2021 20:26:00 35.33 kg/m2               Universi

ty of



                                                                 [facility]



                                                                 Branch

 

             Systolic blood 2021-03-10 21:03:00 138 mm[Hg]                Univer

sity of



             pressure                                            [facility]



                                                                 Branch

 

             Diastolic blood 2021-03-10 21:03:00 78 mm[Hg]                 Unive

rsity of



             pressure                                            [facility]



                                                                 Branch

 

             Heart rate   2021-03-10 21:02:00 87 /min                   Universi

ty of



                                                                 [facility]



                                                                 Branch

 

             Body temperature 2021-03-10 21:02:00 36.89 Yamileth                 Univ

ersity of



                                                                 [facility]



                                                                 Branch

 

             Respiratory rate 2021-03-10 21:02:00 16 /min                   Univ

ersity of



                                                                 [facility]



                                                                 Branch

 

             Body height  2021-03-10 21:02:00 160 cm                    [facility]

ty of



                                                                 [facility]



                                                                 [facility]anch

 

             Body weight  2021-03-10 21:02:00 89.926 kg                 Universi

ty of



                                                                 [facility]



                                                                 [facility]anch

 

             BMI          2021-03-10 21:02:00 35.12 kg/m2               Universi

ty of



                                                                 [facility]



                                                                 [facility]anch

 

             Systolic blood 2021 20:00:00 135 mm[Hg]                Univer

sity of



             pressure                                            [facility]



                                                                 [facility]anch

 

             Diastolic blood 2021 20:00:00 86 mm[Hg]                 Unive

rsity of



             pressure                                            [facility]



                                                                 Branch

 

             Heart rate   2021 20:00:00 83 /min                   Universi

ty of



                                                                 [facility]



                                                                 [facility]

 

             Respiratory rate 2021 20:00:00 18 /min                   Univ

ersity of



                                                                 [facility]

 

             Oxygen saturation in 2021 20:00:00 99 /min                   

[facility]



             Arterial blood by                                        [facility]

lo



             Pulse oximetry                                        Branch

 

             Body temperature 2021 19:23:00 37.06 Yamileth                 Univ

ersity of



                                                                 [facility]



                                                                 [facility]anch

 

             Body weight  2021 19:23:00 89.812 kg                 Universi

ty of



                                                                 [facility]



                                                                 [facility]anch

 

             BMI          2021 19:23:00 37.41 kg/m2               Universi

ty of



                                                                 [facility]



                                                                 [facility]

 

             Systolic blood 2021 19:49:00 129 mm[Hg]                Univer

sity of



             pressure                                            [facility]



                                                                 [facility]

 

             Diastolic blood 2021 19:49:00 81 mm[Hg]                 Unive

rsity of



             pressure                                            [facility]



                                                                 [facility]

 

             Heart rate   2021 19:49:00 77 /min                   Universi

ty of



                                                                 [facility]



                                                                 [facility]anch

 

             Body temperature 2021 19:49:00 36.61 Yamileth                 Univ

ersity of



                                                                 [facility]



                                                                 [facility]anch

 

             Respiratory rate 2021 19:49:00 16 /min                   Univ

ersity of



                                                                 [facility]



                                                                 [facility]

 

             Body height  2021 19:49:00 154.9 cm                  [facility]i

ty of



                                                                 [facility]



                                                                 [facility]

 

             Body weight  2021 19:49:00 90.22 kg                  Universi

ty of



                                                                 [facility]



                                                                 [facility]anch

 

             BMI          2021 19:49:00 37.58 kg/m2               Universi

ty of



                                                                 [facility]



                                                                 [facility]

 

             Systolic blood 2021 16:46:00 105 mm[Hg]                Univer

sity of



             pressure                                            [facility]



                                                                 [facility]

 

             Diastolic blood 2021 16:46:00 66 mm[Hg]                 Unive

rsity of



             pressure                                            [facility]



                                                                 Branch

 

             Heart rate   2021 16:46:00 78 /min                   [facility]i

ty of



                                                                 [facility]



                                                                 [facility]

 

             Body temperature 2021 16:46:00 36.72 Yamileth                 Univ

ersity of



                                                                 [facility]



                                                                 [facility]

 

             Respiratory rate 2021 16:46:00 16 /min                   Univ

ersity of



                                                                 [facility]



                                                                 [facility]

 

             Body height  2021 16:46:00 154.9 cm                  [facility]

ty of



                                                                 [facility]



                                                                 [facility]

 

             Body weight  2021 16:46:00 88.055 kg                 Universi

ty of



                                                                 [facility]



                                                                 [facility]

 

             BMI          2021 16:46:00 36.68 kg/m2               [facility]i

ty of



                                                                 [facility]



                                                                 [facility]

 

             Systolic blood 2021 14:44:00 127 mm[Hg]                Univer

sity of



             pressure                                            [facility]



                                                                 [facility]anch

 

             Diastolic blood 2021 14:44:00 74 mm[Hg]                 Unive

rsity of



             pressure                                            [facility]



                                                                 [facility]

 

             Heart rate   2021 14:44:00 79 /min                   Universi

ty of



                                                                 [facility]



                                                                 [facility]

 

             Body temperature 2021 14:44:00 36.44 Yamileth                 Univ

ersity of



                                                                 [facility]



                                                                 [facility]

 

             Respiratory rate 2021 14:44:00 16 /min                   Univ

ersity of



                                                                 [facility]



                                                                 [facility]

 

             Body height  2021 14:44:00 154.9 cm                  [facility]

ty of



                                                                 [facility]



                                                                 [facility]

 

             Body weight  2021 14:44:00 86.456 kg                 Univers[facility]

ty of



                                                                 [facility]



                                                                 [facility]

 

             BMI          2021 14:44:00 36.01 kg/m2               Universi

ty of



                                                                 [facility]



                                                                 [facility]

 

             Systolic blood 2020 15:46:00 98 mm[Hg]                 Univer

sity of



             pressure                                            [facility]



                                                                 [facility]

 

             Diastolic blood 2020 15:46:00 53 mm[Hg]                 Unive

rsity of



             pressure                                            [facility]



                                                                 [facility]

 

             Heart rate   2020 15:46:00 61 /min                   [facility]

ty of



                                                                 [facility]



                                                                 [facility]

 

             Body temperature 2020 15:46:00 36.61 Yamileth                 Univ

ersity of



                                                                 [facility]



                                                                 [facility]

 

             Respiratory rate 2020 15:46:00 16 /min                   Univ

ersity of



                                                                 [facility]



                                                                 [facility]

 

             Body height  2020 15:46:00 154.9 cm                  [facility]

ty of



                                                                 [facility]



                                                                 [facility]

 

             Body weight  2020 15:46:00 86.955 kg                 [facility]

ty of



                                                                 [facility]



                                                                 [facility]

 

             BMI          2020 15:46:00 36.22 kg/m2               [facility]

ty of



                                                                 [facility]



                                                                 [facility]

 

             Systolic blood 2020 19:41:00 111 mm[Hg]                Univer

sity of



             pressure                                            [facility]



                                                                 [facility]

 

             Diastolic blood 2020 19:41:00 58 mm[Hg]                 Unive

rsity of



             pressure                                            [facility]



                                                                 [facility]

 

             Heart rate   2020 19:41:00 53 /min                   [facility]

ty of



                                                                 [facility]



                                                                 [facility]

 

             Body temperature 2020 19:41:00 37.28 Yamileth                 Univ

ersity of



                                                                 [facility]



                                                                 [facility]

 

             Respiratory rate 2020 19:41:00 16 /min                   Univ

ersity of



                                                                 [facility]



                                                                 [facility]

 

             Body height  2020 19:41:00 154.9 cm                  [facility]

ty of



                                                                 [facility]



                                                                 [facility]

 

             Body weight  2020 19:41:00 87 kg                     [facility]

ty of



                                                                 [facility]



                                                                 [facility]

 

             BMI          2020 19:41:00 36.24 kg/m2               [facility]

ty of



                                                                 [facility]



                                                                 [facility]

 

             Systolic blood 2020-10-05 14:14:00 125 mm[Hg]                Univer

sity of



             pressure                                            [facility]



                                                                 [facility]anch

 

             Diastolic blood 2020-10-05 14:14:00 60 mm[Hg]                 Unive

rsity of



             pressure                                            [facility]



                                                                 [facility]

 

             Heart rate   2020-10-05 14:14:00 63 /min                   [facility]

ty of



                                                                 [facility]



                                                                 [facility]

 

             Body temperature 2020-10-05 14:14:00 37 Yamileth                    Univ

ersity of



                                                                 [facility]



                                                                 [facility]anch

 

             Respiratory rate 2020-10-05 14:14:00 16 /min                   Univ

ersity of



                                                                 [facility]



                                                                 [facility]anch

 

             Body height  2020-10-05 14:14:00 154.9 cm                  [facility]

ty of



                                                                 [facility]



                                                                 [facility]anch

 

             Body weight  2020-10-05 14:14:00 85.957 kg                 [facility]

 

             BMI          2020-10-05 14:14:00 35.81 kg/m2               [facility]

 

             Systolic blood 2020 22:15:00 112 mm[Hg]                Univer

[facility]

 

             Diastolic blood 2020 22:15:00 65 mm[Hg]                 Unive

[facility]

 

             Heart rate   2020 22:15:00 56 /min                   [facility]

 

             Respiratory rate 2020 22:15:00 18 /min                   [facility]

 

             Oxygen saturation in 2020 22:15:00 100 /min                  

[facility]



             Arterial blood by                                        [facility]

[facility]



             Pulse oximetry                                        [place]

 

             Body temperature 2020 20:20:00 37.11 Yamileth                 [facility]

 

             Body height  2020 20:20:00 154.9 cm                  [facility]

 

             Body weight  2020 20:20:00 81.647 kg                 [facility]

 

             BMI          2020 20:20:00 34.01 kg/m2               [facility]







Procedures







                Procedure       Date / Time     Performing Clinician Source



                                Performed                       

 

                POCT GRP A STREP 2021 20:32:00 Katelin Garcia [facility] [place]



                (MOLECULAR)                                     [facility]

 

                ASSIGNMENT OF BENEFITS 2021-10-18 19:59:42 Doctor Unassigned, No

 [facility]



                                                Name            [facility]

 

                GARDASIL 9 (HPV 9V) 2021 21:21:22 Donna Renner [facility]

 

                GARDASIL 9 (HPV 9V) 2021 20:59:38 Nneka López [facility]

 

                CBC WITH DIFF   2021 11:40:00 Dolores Traore [facility]

 

                 SECTION 2021 01:43:00 [facility] [facility]

 

                URINALYSIS      2021 23:59:00 Zaria Medina  [facility]

 

                PROTEIN CREAT RATIO 2021 23:59:00 Zaria Medina  [facility] [place]



                URINE RANDOM                                    [facility]

 

                SGOT (ASPARTATE AMINO 2021 23:51:00 Zaria Medina  [facility]

[facility] [place]



                TRANSFER)                                       [facility]

 

                CREATININE      2021 23:51:00 Carol [facility]

 

                ALANINE AMINO   2021 23:51:00 Carol [facility]



                TRANSFERASE(SGPT                                 Medical Branch

 

                LACTATE DEHYDROGENASE 2021 23:51:00 Zaria Medina  [facility]

 

                URIC ACID       2021 23:51:00 Zaria Medina  [facility]

 

                CBC WITH DIFF   2021 23:51:00 Carol [facility]

 

                CENTRAL NEURAXIAL BLOCK 2021 05:10:15 Dianelys Crawford

[facility]

 

                URINALYSIS      2021 14:54:00 Macie Ortega  [facility]

 

                PROTEIN CREAT RATIO 2021 14:54:00 Macie Ortega  [facility]



                URINE RANDOM                                    [facility]

 

                SGOT (ASPARTATE AMINO 2021 14:53:00 Macie Ortega  [facility]

[facility] [place]



                TRANSFER)                                       [facility] Branch

 

                CREATININE      2021 14:53:00 Macie Ortega  [facility]

 

                ALANINE AMINO   2021 14:53:00 Macie Ortega  [facility]



                TRANSFERASE(SGPT                                 Medical Branch

 

                LACTATE DEHYDROGENASE 2021 14:53:00 Macie Ortega  [facility]

 

                URIC ACID       2021 14:53:00 Macie Ortega  [facility]

 

                CBC WITH DIFF   2021 14:53:00 Macie Ortega  [facility]

 

                HEPATITIS B SURFACE 2021 14:53:00 Macie Ortega  [facility]



                ANTIGEN                                         [facility]

 

                HIV 1/2 AG-AB WITH 2021 14:53:00 Macie Ortega  [facility]



                REFLEX                                          [facility]

 

                GALV ONLY - SYPHILIS 2021 14:53:00 Macie Ortega  [facility]



                IGG/IGM                                         Medical Branch

 

                HB ABO GROUPING 2021 14:30:00 Jordan Macie  [place]

f [facility]

 

                RHO (D) IMMUNE GLOBULIN 2021 14:30:00 Cris Fitch [facility]

 

                COVID-19 (ID NOW RAPID 2021 13:08:00 Regi Traylor [facility]

[facility] [place]



                TESTING)                                        [facility]

 

                POCT URINALYSIS W/O 2021 21:24:00 Diane Moreno [facility]



                SPECIFIC GRAVITY                                 [facility]

 

                POCT URINALYSIS 2021 19:30:00 Donna [facility]

 

                POCT URINALYSIS 2021 20:27:00 Donna Renner [facility]

 

                CONSENT/REFUSAL FOR 2021 19:16:07 Doctor Unassigned, No Un

[facility]



                DIAGNOSIS AND TREATMENT                 Name            [facility]

 

                POCT URINALYSIS 2021 19:50:00 Donna Renner [facility]

 

                POCT URINALYSIS 2021 16:54:00 Donna Renner [facility]

 

                POCT URINALYSIS 2021 00:00:00 Donna Renner [facility]

 

                POCT URINALYSIS 2020 00:00:00 Donna Renner [facility]

 

                POCT URINALYSIS 2020 00:00:00 Donna Renner [facility]

 

                GLUCOSE 1 HOUR POST 2020-10-05 15:48:00 Donna Renner

[facility]

 

                CBC WITH DIFF   2020-10-05 15:48:00 Donna Renner [facility]

 

                RUBELLA SCREEN IGG 2020-10-05 15:48:00 Donna Renner

sity [facility]

 

                VZV ANTIBODY SCREEN 2020-10-05 15:48:00 Donna Renner

[facility]

 

                HEPATITIS B SURFACE 2020-10-05 15:48:00 Donna Renner

[facility]



                ANTIGEN                                         [facility]

 

                HIV 1/2 AG-AB WITH 2020-10-05 15:48:00 Donna Renner

sity [facility]



                REFLEX                                          [facility]

 

                GALV ONLY - SYPHILIS 2020-10-05 15:48:00 Donna Renner Univ

[facility] [place]



                IGG/IGM                                         [facility]

 

                SARS-COV-2 IGG  2020-10-05 15:48:00 Donna Renner [facility]

 

                HB ABO GROUPING 2020-10-05 15:40:00 Donna Renner [facility]

 

                URINE CULTURE   2020-10-05 15:29:00 Donna Renner [facility]

 

                GC & CHLAMYDIA AMPLIFIED 2020-10-05 15:29:00 Donna Renner 

[facility]

 

                LAB ONLY PAP    2020-10-05 15:29:00 Donna Renner [facility]



                SMEAR-LIQUID BASED                                 Medical Branc

h

 

                TRICHOMONAS AMPLIFIED 2020-10-05 15:29:00 [facility]

 

                PAP SMEAR-LIQUID 2020-10-05 15:29:00 Donna Renner [facility]

[facility] [place]



                BASED-                                        Medical Branch

 

                FLU VACC ([phone]), 6+ 2020-10-05 14:40:08 Donna Renner 

[facility]



                MONTHS, IM, QUAD                                 Medical Branch

 

                ASSIGNMENT OF BENEFITS 2020-10-05 13:44:38 Doctor Unassigned, No

 [facility]



                                                Name            [facility]

 

                POCT PREGNANCY TEST 2020-10-05 00:00:00 Donna Renner

[facility]

 

                POCT URINALYSIS W/O 2020-10-05 00:00:00 Donna Renner

[facility]



                SPECIFIC GRAVITY                                 [facility]

 

                LIPASE          2020 20:52:00 Nolvia Carlton [facility]

 

                COMP. METABOLIC PANEL 2020 20:52:00 [facility]



                ([id number])                                         [facility]

 

                TOTAL BETA HCG ASSAY 2020 20:52:00 [facility]

 

                CBC WITH DIFF   2020 20:52:00 Nolvia Carlton [facility]

 

                URINALYSIS      2020 20:52:00 Nolvia Carlton [facility]

 

                POCT PREGNANCY TEST 2020 20:51:00 Nolvia Carlton [facility]

 

                NOTICE OF PRIVACY 2020 20:09:02 Doctor Unassigned, No [facility]



                PRACTICES                       Name            [facility]







Encounters







        Start   End     Encounter Admission Attending Care    Care    Encounter 

Source



        Date/Time Date/Time Type    Type    Clinicians Facility Department ID   

   

 

        2021-10-31         Outpatient                 [facility]    [id number]

 Univers



        16:33:05                                                         ity [facility]

 

        2021-10-31         Emergency                 [facility]    [id number] 

Univers



        03:14:37                                                         ity [facility]

 

        2022-01-10 2022-01-10 Outpatient R               [facility]    [id number]

-20 Univers



        15:30:00 15:30:00                                         [id number]  ity [facility]

 

        2022-01-10 2022-01-10 Outpatient R               [facility]    [id number]

212 Univers



        15:30:00 15:30:00                                                 ity [facility]

 

        2021 Outpatient R               [facility]    [id number]

-20 Univers



        15:00:00 15:00:00                                         [id number]  ity [facility]

 

        [facility]       [facility]    [id number]

[id number] Univers



        15:00:00 15:00:00                 ROSHUNDA                         ity o

f



                                                                        [facility]

 

        [facility]    [id number]

[id number] Univers



        00:00:00 00:00:00                 [employer]  350.1.13.10         it

y of



                                                [employer] [id number]         Anjel

as



                                                TED?BLEA [phone]         [address]



                                                MEDICAL                 



                                                OFFICE                  



                                                BUILDING                 

 

        2021 Urgent          Kizzy [facility]    1.2.840.114 [id number]

31 Univers



        13:56:27 14:16:27 Care            [facility]  350.1.13.10         it

y of



                                                [place] 4.2.7.2.686         Anjel

as



                                                TED?BLEA [phone]         Me

[place]



                                                [address]



                                                MEDICAL                 



                                                OFFICE                  



                                                [facility]                 

 

        2021 Outpatient R               [facility]    [id number]

-20 Univers



        14:00:00 14:00:00                                         [id number]  ity [facility]

 

        [facility]       [facility]    [id number]

200 Univers



        14:00:00 14:00:00                 MARY LOU                           ity [facility]

 

        2021 Letter          Provider, [facility]    [id number]

[id number] Univers



        00:00:00 00:00:00 (Out)           [facility]  350.1.13.10         it

y of



                                        [facility] [place] [id number]        

 [place]



                                                TED?BLEA [phone]         [address]



                                                MEDICAL                 



                                                OFFICE                  



                                                [facility]                 

 

        2021-10-18 2021-10-18 Nurse           [facility]    1.2

.840.114 [id number] 

Univers



        15:00:16 15:26:04 Visit           Donna Renner R OB/GYN  350.1.13.10

         ity of



                                                REGIONAL [id number]         Anjel

as



                                                MATERNAL [phone]         Med

ical



                                                & CHILD [address] [place] [address]                 

 

        2021-10-18 2021-10-18 Outpatient R               [facility]    [id number]

-20 Univers



        15:00:00 15:00:00                                         [id number]  ity of



                                                                        [facility]

 

        2021-10-18 2021-10-18 Outpatient R               [facility]    [id number]

857 Univers



        15:00:00 15:00:00                                                 ity [facility]

 

        2021-10-18 2021-10-18 Orders          Doctor ROBBINS    [id number] [id number]

[id number] Univers



        00:00:00 00:00:00 Only            Unassigned, CAMACHO   350.1.13.10       

  ity of



                                        [place] [facility] [id number]         Anjel

as



                                                        [phone]         [address]

 

        2021 Letter          ROSENDO Tobar    1.2.840.114 [id number]

60 Univers



        00:00:00 00:00:00 (Out)           Micheline PERALTA CAMACHO   350.1.13.10         it

y of



                                                [facility] [id number]         Anjel

as



                                                        [phone]         [address]

 

        2021 Laboratory         Only, Ang Db Test [facility]    [id number]

40.114 [id number] 

Univers



        16:56:39 17:06:39 Only            [place] [facility]  350.1.13.10      

   ity [facility] [id number]         Anjel

as



                                                Ted?Blea [phone]         [address]



                                                Medical                 



                                                Office                  



                                                Building                 

 

        2021 Outpatient R               [facility]    [id number]

-20 Univers



        16:50:00 16:50:00                                         [id number]  ity [facility]

 

        2021 Outpatient R               [facility]    [id number]

735 Univers



        16:50:00 16:50:00                                                 ity [facility]

 

        2021 Case            Jud [facility]    1.2.840.114 [id number]

75 Univers



        00:00:00 00:00:00 Management         Donna ROJAS OB/GYN  350.1.13.10     

    ity of



                                                REGIONAL 4.2.7.2.686         Anjel

as



                                                MATERNAL [phone]         Med

ical



                                                & CHILD [address] [place] [address]                 

 

        2021 Telephone         Jud [facility]    [id number]

[id number] Univers



        00:00:00 00:00:00                 Donna ROJAS OB/GYN  350.1.13.10        

 ity of



                                                REGIONAL 4.2.7.2.686         Anjel

as



                                                MATERNAL [phone]         Med

ical



                                                & CHILD [address] [place] [address]                 

 

        2021 Patient         Doctor  [facility]    1.2.840.114 [id number]

[id number] Univers



        00:00:00 00:00:00 Secure Msg         Unassigned, OB/GYN  350.1.13.10    

     ity of



                                        No Name REGIONAL 4.2.7.2.686         Anjel

as



                                                MATERNAL [phone]         Med

ical



                                                & CHILD [address] [place] [address]                 

 

        2021 Nurse           Visit, Ang-Rmchp Nurse [facility]    1.2

.840.114 [id number] 

Univers



        15:41:55 16:16:26 Visit           Chino Rennerndema R OB/GYN  350.1.13.10

         ity of



                                                REGIONAL 4.2.7.2.686         Anjel

as



                                                MATERNAL [phone]         Med

ical



                                                & [address]                 

 

        2021 Outpatient R               [facility]    [id number]

-20 Univers



        15:30:00 15:30:00                                         [id number]  ity [facility]

 

        2021 Outpatient R               [facility]    [id number]

[id number] Univers



        15:30:00 15:30:00                                                 ity [facility]

 

        2021 Outpatient R               [facility]    [id number]

-20 Univers



        15:30:00 15:30:00                                         [id number]  ity [facility]

 

        2021 Outpatient R               [facility]    [id number]

466 Univers



        15:30:00 15:30:00                                                 ity [facility]

 

        2021 Office          Jud [facility]    1.2.840.114 [id number]

53 Univers



        15:01:43 16:06:53 Visit           Maxinea R OB/GYN  350.1.13.10        

 ity of



                                                REGIONAL 4.2.7.2.686         Anjel

as



                                                MATERNAL [phone]         [address]                 

 

        2021 Outpatient R       JUD [facility]    [id number]

-20 Univers



        15:00:00 15:00:00                 DONNA                 [id number]  ity o

f



                                                                        [facility]

 

        [facility]       [facility]    [id number]

452 Univers



        15:00:00 15:00:00                 TABITHAASHISH                         ity o

f



                                                                        [facility]

 

        2021 Routine         Akinbrian [facility]    [id number]

[id number] Univers



        12:53:45 13:33:26 Prenatal         Diane HOGAN OB/GYN  350.1.13.10       

  ity of



                        Visit                   REGIONAL 4.2.7.2.686         Anjel

as



                                                MATERNAL [phone]         Med

ical



                                                & CHILD [address] [place] [address]                 

 

        2021 Outpatient R       [facility]    [id number]

4A-20 Univers



        12:45:00 12:45:00                 DIANE                 [id number]  ity o

f



                                                                        [facility]

 

        2021 Outpatient R       [facility]    [id number]

[id number] Univers



        12:45:00 12:45:00                 DIANE                         ity o

f



                                                                        [facility]

 

        2021-05-10 2021-05-10 Outpatient R               [facility]    [id number]

-20 Univers



        13:30:00 13:30:00                                         [id number]  ity of



                                                                        [facility]

 

        2021-05-10 2021-05-10 Outpatient R               [facility]    [id number]

815 Univers



        13:30:00 13:30:00                                                 ity [facility]

 

        2021-05-10 2021-05-10 Nurse           Visit, [facility] Nurse [facility]    1.2

.840.114 [id number] 

Univers



        13:23:49 13:23:56 Visit           Donna Renner BOB OB/GYN  350.1.13.10

         ity of



                                                REGIONAL 4.2.7.2.686         Anjel

as



                                                MATERNAL [phone]         Med

ical



                                                & CHILD [address] [place] [address]                 

 

        2021 Anesthesia         Dianelys Crawford    1.2.

840.114 [id number] 

Univers



        23:31:00 22:20:00 Event           Satish Coronel   350.1.13.10 

        ity of



                                                [place]   4.2.7.2.686         [place]

[place]



                                                        [phone]         [address]

 

        2021 Surgery         ROSENDO Sneed    1.2.840.114 [id number]

839 Univers



        20:15:00 22:12:00                 Rudy SAUER   350.1.13.10         i

ty of



                                                [place]   4.2.7.2.686         [place]

[place]



                                                        [phone]         [address]

 

        2021 Routine         [facility]    [id number]

[id number] Univers



        16:13:07 16:44:21 Prenatal         Diane HOGAN OB/GYN  350.1.13.10       

  ity of



                        Visit                   REGIONAL 4.2.7.2.686         Anjel

as



                                                MATERNAL [phone]         Med

ical



                                                & [address]                 

 

        2021 [facility] R       [facility]    [id number]

4A-20 Univers



        16:00:00 16:00:00                 DIANE                 [id number]  ity o

[facility]

 

        2021 Outpatient R       [facility]    [id number]

[id number] Univers



        16:00:00 16:00:00                 DIANE                         ity o

[facility]

 

        2021 Telephone         [facility]    [id number]

[id number] Univers



        00:00:00 00:00:00                 Tabithachanelle ROJAS OB/GYN  350.1.13.10        

 ity of



                                                REGIONAL 4.2.7.2.686         Anjel

as



                                                MATERNAL [phone]         Med

ical



                                                & [address]                 

 

        2021 Outpatient BOB RENNER [facility]    [id number]

-20 Univers



        14:45:00 14:45:00                 ROSJEFFRYNDEMA                 [id number]  ity o

[facility]

 

        [facility]    [id number]

227 Univers



        14:45:00 14:45:00                 ROSNDA                         ity o

[facility]

 

        2021 Nurse           ROSENDO Morris    1.2.840.114 [id number]

26 Univers



        00:00:00 00:00:00 Triage          Lee Ann SAUER   350.1.13.10         i

ty of



                                                [facility] 4.2.7.2.686         Anjel

as



                                                        [phone]         Medi

lo



                                                        [address]             [place]

 

        [facility]    [id number]

-20 Univers



        12:45:00 12:45:00                 ROSHUNDA                 [id number]  ity o

[facility]

 

        2021 Outpatient R       JUD [facility]    [id number]

225 Univers



        12:45:00 12:45:00                 ROSNDA                         [place]

[facility]

 

        2021 Outpatient R       JUD [facility]    [id number]

-20 Univers



        09:45:00 09:45:00                 ROSNDA                 [id number]  [place]

[facility]

 

        2021 Outpatient R       JUD [facility]    [id number]

764 Univers



        09:45:00 09:45:00                 TABITHANDA                         [place]

[facility]

 

        2021 Outpatient R       JUD [facility]    [id number]

-20 Univers



        15:00:00 15:00:00                 DONNA                 [id number]  [place] o

[facility]

 

        2021 Outpatient BOB RENNER [facility]    [id number]

233 Univers



        15:00:00 15:00:00                 TABITHANDA                         [place] o

[facility]

 

        2021 [facility]ine         [facility]    1.2.840.114 [id number]

59 Univers



        14:23:28 14:54:43 Prenatal         Roshunda R OB/GYN  350.1.13.10       

  ity of



                        Visit                   REGIONAL 4.2.7.2.686         Anjel

as



                                                MATERNAL [phone]         Med

ical



                                                & CHILD [address] [place] [address]                 

 

        2021 Routine         [facility]    1.2.840.114 [id number]

45 Univers



        15:11:01 15:43:49 Prenatal         Roshunda R OB/GYN  350.1.13.10       

  ity of



                        Visit                   REGIONAL 4.2.7.2.686         Anjel

as



                                                MATERNAL [phone]         Med

ical



                                                & CHILD [address] [place] [address]                 

 

        2021 Outpatient BOB RENNER [facility]    [id number]

-20 Univers



        15:00:00 15:00:00                 CHINONDA                 [id number]  [facility] o

[facility]

 

        2021 Outpatient BOB RENNER [facility]    [id number]

331 Univers



        15:00:00 15:00:00                 DONNA wheat

[facility]

 

        2021 Outpatient BOB RENNER [facility]    [id number]

257 Univers



        15:00:00 15:00:00                 MAXINEA                         briana o

[facility]

 

        2021 Outpatient BOB RENNER [facility]    [id number]

-20 Univers



        15:00:00 15:00:00                 MAXINEEMA                 [id number]  [place] o

[facility]

 

        2021-03-10 2021-03-10 Routine         [facility]    1.2.840.114 [id number]

58 Univers



        14:46:07 15:31:37 Prenatal         Donna R OB/GYN  350.1.13.10       

  ity of



                        Visit                   REGIONAL 4.2.7.2.686         Anjel

as



                                                MATERNAL [phone]         Med

ical



                                                & CHILD [address] [place] [address]                 

 

        2021-03-10 2021-03-10 Outpatient BOB RENNER [facility]    [id number]

-20 Univers



        15:00:00 15:00:00                 MAXINEEMA                 [id number]  briana wheat

[facility]

 

        2021-03-10 2021-03-10 Outpatient BOB RENNER [facility]    [id number]

032 Univers



        15:00:00 15:00:00                 DONNA wheat

[facility]

 

        2021 Emergency         Singer, [facility]    [id number] [id number]

6076 Univers



        13:24:00 14:53:00                 Dawit Crespo 350.1.13.10         i

ty of



                                                [place] 4.2.7.2.686         [place]

[place]



                                                [place]  [phone]         Medi

lo



                                                        084             Branch

 

        2021 Orders          Doctor  ROSENDO    1.2.840.114 [id number]

70 Univers



        00:00:00 00:00:00 Only            Unassigned, CAMACHO   350.1.13.10       

  ity of



                                        [facility] 4.2.7.2.686         Anjel

as



                                                        [phone]         Medi

lo



                                                        009             Branch

 

        2021 Routine         [facility]    1.2.840.114 [id number]

73 Univers



        13:30:56 13:45:56 Prenatal         Roshunda R OB/GYN  350.1.13.10       

  ity of



                        Visit                   REGIONAL 4.2.7.2.686         Anjel

as



                                                MATERNAL [phone]         Med

ical



                                                & [address]                 

 

        2021 Outpatient BOB RENNER [facility]    [id number]

-20 Univers



        13:30:00 13:30:00                 ROSHUNDA                 [id number]  ity o

[facility]

 

        2021 Outpatient BOB RENNER [facility]    [id number]

016 Univers



        13:30:00 13:30:00                 ROSHUNDA                         ity o

[facility]

 

        2021 Outpatient BOB RENNER [facility]    [id number]

-20 Univers



        12:45:00 12:45:00                 ROSHUNDA                 [id number]  ity o

[facility]

 

        2021 Outpatient BOB RENNER [facility]    [id number]

814 Univers



        12:45:00 12:45:00                 ROSHUNDA                         ity o

[facility]

 

        2021 Outpatient BOB RENNER [facility]    [id number]

-20 Univers



        08:30:00 08:30:00                 ROSHUNDA                 [id number]  ity o

[facility]

 

        [facility]    [id number]

062 Univers



        08:30:00 08:30:00                 ROSHUNDA                         y o

[facility]

 

        2021 Routine         [facility]    1.2.840.114 [id number]

06 Univers



        10:39:26 11:18:04 Prenatal         Roshunda R OB/GYN  350.1.13.10       

  ity of



                        Visit                   REGIONAL 4.2.7.2.686         Anjel

as



                                                MATERNAL [phone]         Med

ical



                                                & [address]                 

 

        [facility]    [id number]

-[facility] Univers



        11:00:00 11:00:00                 ROSHUNDA                 [id number]  ity o

[facility]

 

        2021 Outpatient R       JUD [facility]    [id number]

897 Univers



        11:00:00 11:00:00                 ROSJEFFRYNDA                         ity o

f



                                                                        [facility]

 

        2021 [place]         [facility]    1.2.840.114 [id number]

489 Univers



        00:00:00 00:00:00                 Rosjeffrynda R OB/GYN  350.1.13.10        

 ity of



                                                REGIONAL 4.2.7.2.686         Anjel

as



                                                MATERNAL [phone]         Med

ical



                                                & CHILD [address] [place] [address]                 

 

        2021 Technician         [facility]    1.2

.840.114 [id number] 

Univers



        13:49:18 15:04:18 Visit           Felipe, Alex BROWN OB/GYN  350.1.13.10   

      ity of



                                                REGIONAL 4.2.7.2.686         Anjel

as



                                                MATERNAL [phone]         Med

ical



                                                & CHILD 369             [facility]                 

 

        2021 Outpatient P               [facility]    [id number]

-20 Univers



        14:00:00 14:00:00                                         [id number]  ity [facility]

 

        2021 Outpatient P               [facility]    [id number]

025 Univers



        14:00:00 14:00:00                                                 ity [facility]

 

        2021 Routine         [facility]    1.2.840.114 [id number]

87 Univers



        08:30:53 09:07:35 Prenatal         Rosjeffrynda R OB/GYN  350.1.13.10       

  ity of



                        Visit                   REGIONAL 4.2.7.2.686         Anjel

as



                                                MATERNAL [phone]         Med

ical



                                                & CHILD [address] [place] [address]                 

 

        [facility]       [facility]    [id number]

-[facility] Univers



        08:15:00 08:15:00                 MAXINEA                 [id number]  ity o

f



                                                                        [facility]

 

        [facility]    [id number]

307 Univers



        08:15:00 08:15:00                 ROSJEFFRYNDA                         ity o

f



                                                                        [facility]

 

        2020 Refill          Doctor  [facility]    1.2.840.114 [id number]

47 Univers



        00:00:00 00:00:00                 Unassigned, OB/GYN  350.1.13.10       

  ity of



                                        No Name REGIONAL 4.2.7.2.686         Anjel

as



                                                MATERNAL [phone]         Med

ical



                                                & CHILD [address] [place] [address]                 

 

        2020-12-15 2020-12-15 Telephone         [facility]    1.2.094.692 [id number]

3578 Univers



        00:00:00 00:00:00                 Rosjeffrynda R OB/GYN  350.1.13.10        

 ity of



                                                REGIONAL 4.2.7.2.686         Anjel

as



                                                MATERNAL [phone]         Med

ical



                                                & CHILD [address] [place] [address]                 

 

        2020 Outpatient R               [facility]    [id number]

-20 Univers



        09:30:00 09:30:00                                         2012  ity of



                                                                        [facility]

 

        2020 Outpatient P               [facility]    [id number]

933 Univers



        09:30:00 09:30:00                                                 ity of



                                                                        [facility]

 

        2020-12-10 2020-12-10 Telephone         [facility]    1.2.640.366 [id number]

8492 Univers



        00:00:00 00:00:00                 Chinonda R OB/GYN  350.1.13.10        

 ity of



                                                REGIONAL 4.2.7.2.686         Anjel

as



                                                MATERNAL [phone]         Med

ical



                                                & CHILD [address] [place] [address]                 

 

        2020 [facility]       [facility]    [id number]

-20 Univers



        10:15:00 10:15:00                 CHINONDA                   ity o

f



                                                                        [facility]

 

        2020 [facility]       [facility]    [id number]

783 Univers



        10:15:00 10:15:00                 ROSHUNDA                         ity o

f



                                                                        [facility]

 

        2020 Routine         [facility]    1.2.840.114 [id number]

51 Univers



        09:21:49 09:36:49 Prenatal         Rosjeffrynda R OB/GYN  350.1.13.10       

  ity of



                        Visit                   REGIONAL 4.2.7.2.686         Anjel

as



                                                MATERNAL [phone]         Med

ical



                                                & CHILD 107             [facility]                 

 

        2020-11-10 2020-11-10 Refill          Doctor  [facility]    1.2.840.114 [id number]

07 Univers



        00:00:00 00:00:00                 Unassigned, OB/GYN  350.1.13.10       

  ity of



                                        No Name REGIONAL 4.2.7.2.686         Anjel

as



                                                MATERNAL [phone]         Med

ical



                                                & CHILD [address] [place] [address]                 

 

        2020 Routine         Jud [facility]    1.2.840.114 [id number]

70 Univers



        13:31:08 14:07:59 Prenatal         Donna R OB/GYN  350.1.13.10       

  ity of



                        Visit                   REGIONAL 4.2.7.2.686         Anjel

as



                                                MATERNAL [phone]         Med

ical



                                                & CHILD [address] [place] [address]                 

 

        2020 Technician         Ultrasound, Ang-[facility]    1.2

.840.114 [id number] 

Univers



        12:49:51 14:04:51 Visit           Alex Wang OB/GYN  350.1.13.10   

      ity of



                                                REGIONAL 4.2.7.2.686         Anjel

as



                                                MATERNAL [phone]         Med

ical



                                                & CHILD 369             [facility]                 

 

        2020 Outpatient R               [facility]    [id number]

-20 Univers



        13:00:00 13:00:00                                           ity of



                                                                        [facility]

 

        2020 Outpatient P               [facility]    [id number]

096 Univers



        13:00:00 13:00:00                                                 ity of



                                                                        [facility]

 

        2020 Abstract         Jud [facility]    1.2.840.114 [id number]

699 Univers



        00:00:00 00:00:00                 Donna ROJAS OB/GYN  350.1.13.10        

 ity of



                                                REGIONAL 4.2.7.2.686         Anjel

as



                                                MATERNAL [phone]         Med

ical



                                                & CHILD [address] [place] [address]                 

 

        2020 Outpatient R       JUD [facility]    [id number]

-20 Univers



        09:30:00 09:30:00                 DONNA                   ity o

f



                                                                        [facility]

 

        [facility]    [id number]

427 Univers



        09:30:00 09:30:00                 ROSJEFFRYNDA                         ity o

f



                                                                        [facility]

 

        2020-10-12 2020-10-12 [facility]    [id number] [id number]

8541 Univers



        00:00:00 00:00:00                 Roshunda R OB/GYN  350.1.13.10        

 ity of



                                                REGIONAL 4.2.7.2.686         Anjel

as



                                                MATERNAL [id number]         Med

ical



                                                & CHILD [address] [place] [address]                 

 

        2020-10-08 2020-10-08 [place]         [facility]    [id number] [id number]

4873 Univers



        00:00:00 00:00:00                 Rosjeffrynda R OB/GYN  350.1.13.10        

 ity of



                                                REGIONAL 4.2.7.2.686         Anjel

as



                                                MATERNAL [id number]         Med

ical



                                                & CHILD [address] [place] [address]                 

 

        2020-10-07 2020-10-07 [place]         [facility]    [id number]

5053 Univers



        00:00:00 00:00:00                 Roshunda R OB/GYN  350.1.13.10        

 ity of



                                                REGIONAL 4.2.7.2.686         Anjel

as



                                                MATERNAL [phone]         Med

ical



                                                & [address]                 

 

        2020-10-07 2020-10-07 [place]         [facility]    1.2.684.336 [id number]

1211 Univers



        00:00:00 00:00:00                 Roshunda R OB/GYN  350.1.13.10        

 ity of



                                                REGIONAL 4.2.7.2.686         Anjel

as



                                                MATERNAL [phone]         Med

ical



                                                & CHILD [address] [place] [address]                 

 

        2020-10-05 2020-10-05 [facility]         [facility]    1.2.840.114 [id number]

76 Univers



        08:50:17 10:43:51 Prenatal         Roshunda R OB/GYN  350.1.13.10       

  ity of



                        Visit                   REGIONAL 4.2.7.2.686         Anjel

as



                                                MATERNAL [phone]         Med

ical



                                                & CHILD [address] [place] [address]                 

 

        2020-10-05 2020-10-05 Outpatient BOB RENNER [facility]    [id number]

144 Univers



        09:00:00 09:00:00                 ROSASHISH                         ity o

f



                                                                        [facility]

 

        2020-10-05 2020-10-05 Orders          Doctor  ROSENDO    1.2.840.114 [id number]

35 Univers



        00:00:00 00:00:00 Only            Unassigned, CAMACHO   350.1.13.10       

  ity of



                                        [facility] 4.2.7.2.686         Anjel

as



                                                        [phone]         Medi

lo



                                                        009             Branch

 

        2020 Emergency         [facility]    1.2.840.114 [id number]

[id number] Univers



        15:23:00 18:55:00                 Nolvia Crespo 350.1.13.10        

 ity of



                                                [place] 4.2.7.2.686         [facility]  [phone]         Medi

lo



                                                        084             Branch

 

        2020 Emergency X               [facility]    ERT     [id number]

75 Univers



        15:10:00 15:10:00                                                 ity of



                                                                        [facility]

 

        2019 Emergency                 [facility]     MED     [id number]

0 [place]



        20:42:00 20:42:00                                                 Health

 

        2019 Emergency                 [facility]     MED     [id number]

5 [place]



        14:50:00 14:50:00                                                 Health

 

        2018 Emergency                 [facility]     MED     [id number]

7 [place]



        13:39:00 13:39:00                                                 Health

 

        2017 Emergency E               [facility]    MED     [id number]

[address]



        12:25:00 12:25:00                                                 [facility]

 

        2013 Outpatient                 [facility]     [id number]

3 [place]



        07:35:53 07:35:53                                                 Health







Results







           Test Description Test Time  Test Comments Results    Result Comments 

Source









                    POCT GRP A STREP (MOLECULAR) 2021 20:33:00 









                      Test Item  Value      Reference Range Interpretation Comme

nts









             POCT GP A STREP (test code = negative     Negative - Negative      

        



             77031-0)                                            

 

             YESSENIA (test code = YESSENIA) accurate development and                     

      



                          interpretation of all internal                        

   



                          controls                               

 

             Lab Interpretation (test code = Normal                             

    



             21404-2)                                            



[place] [facility] [id number] 14:09:43





             Test Item    Value        Reference Range Interpretation Comments

 

             RHIG CANDIDATE? No- see comment                           Patient i

s not a



             (test code =                                        candidate for R

[facility]-



             5055)                                               Patient is Rh



                                                                 Positive.Perfor

med at



                                                                 [facility] Laboratory



                                                                 Services - [facility] 

Blood



                                                                 [address] [place] [address]



                                                                 [id number] Free:



                                                                 [phone]

A No.



                                                                 [id number]



[facility] WITH [id number] 12:22:00





             Test Item    Value        Reference Range Interpretation Comments

 

             WBC (test code =              See_Comment  H             [Automated



             6690-2)                                             message] The



                                                                 system which



                                                                 generated this



                                                                 result



                                                                 transmitted



                                                                 reference range

:



                                                                 4.30 - 11.10



                                                                 10*3/?L. The



                                                                 reference range



                                                                 was not used to



                                                                 interpret this



                                                                 result as



                                                                 normal/abnormal

.

 

             RBC (test code =              See_Comment  L             [Automated



             789-8)                                              message] The



                                                                 system which



                                                                 generated this



                                                                 result



                                                                 transmitted



                                                                 reference range

:



                                                                 3.93 - 5.25



                                                                 10*6/?L. The



                                                                 reference range



                                                                 was not used to



                                                                 interpret this



                                                                 result as



                                                                 normal/abnormal

.

 

             HGB (test code = 9.5 g/dL     11.6-15.0    L            



             718-7)                                              

 

             HCT (test code = 27.3 %       35.7-45.2    L            



             4544-3)                                             

 

             MCV (test code = 91.3 fL      80.6-95.5                 



             787-2)                                              

 

             MCH (test code = 31.8 pg      25.9-32.8                 



             785-6)                                              

 

             MCHC (test code = 34.8 g/dL    31.6-35.1                 



             786-4)                                              

 

             RDW-SD (test code = 41.1 fL      39.0-49.9                 



             97561-9)                                            

 

             RDW-CV (test code = 12.5 %       12.0-15.5                 



             788-0)                                              

 

             PLT (test code =              See_Comment  L             [Automated



             777-3)                                              message] The



                                                                 system which



                                                                 generated this



                                                                 result



                                                                 transmitted



                                                                 reference range

:



                                                                 166 - 358



                                                                 10*3/?L. The



                                                                 reference range



                                                                 was not used to



                                                                 interpret this



                                                                 result as



                                                                 normal/abnormal

.

 

             MPV (test code = 11.2 fL      9.5-12.9                  



             80115-0)                                            

 

             NRBC/100 WBC (test              See_Comment                [Automat

ed



             code = 8342751312)                                        message] 

The



                                                                 system which



                                                                 generated this



                                                                 result



                                                                 transmitted



                                                                 reference range

:



                                                                 0.0 - 10.0 /100



                                                                 WBCs. The



                                                                 reference range



                                                                 was not used to



                                                                 interpret this



                                                                 result as



                                                                 normal/abnormal

.

 

             NRBC x10^3 (test code <0.01        See_Comment                [Auto

mated



             = 1518267222)                                        message] The



                                                                 system which



                                                                 generated this



                                                                 result



                                                                 transmitted



                                                                 reference range

:



                                                                 10*3/?L. The



                                                                 reference range



                                                                 was not used to



                                                                 interpret this



                                                                 result as



                                                                 normal/abnormal

.

 

             GRAN MAT (NEUT) % 88.6 %                                 



             (test code = 770-8)                                        

 

             IMM GRAN % (test code 0.50 %                                 



             = 6429632145)                                        

 

             LYMPH % (test code = 6.4 %                                  



             736-9)                                              

 

             MONO % (test code = 4.4 %                                  



             5905-5)                                             

 

             EOS % (test code = 0.0 %                                  



             713-8)                                              

 

             BASO % (test code = 0.1 %                                  



             706-2)                                              

 

             GRAN MAT x10^3(ANC) 18.66 10*3/uL 1.88-7.09    H            



             (test code =                                        



             1052731630)                                         

 

             IMM GRAN x10^3 (test 0.11 10*3/uL 0.00-0.06    H            



             code = 8398635361)                                        

 

             LYMPH x10^3 (test 1.34 10*3/uL 1.32-3.29                 



             code = 731-0)                                        

 

             MONO x10^3 (test code 0.93 10*3/uL 0.33-0.92    H            



             = 742-7)                                            

 

             EOS x10^3 (test code <0.03        0.03-0.39    L            



             = 711-2)                                            

 

             BASO x10^3 (test code 0.03 10*3/uL 0.01-0.07                 



             = 704-7)                                            

 

             BANDS (test code = MARKED INCREASED              A            



             8874733536)                                         

 

             Lab Interpretation Abnormal                               



             (test code = 23800-6)                                        



[facility] Urine Fivylm3629-28-61 
02:14:23





             Test Item    Value        Reference Range Interpretation Comments

 

             T. PROT U (test >1000        mg/dL                     Urine protei

n is 1289



             code = 2888-6)                                        mg/dL with ex

tended



                                                                 dilution.

 

             CREAT U (test code 223.7 mg/dL                            



             = 9099768378)                                        

 

             Protein/Creatinine                                        Unable to

 calculate



             Ratio Urine (test                                        because, e

ither urine



             code = 7568664218)                                        total pro

tein, urine



                                                                 creatinine, or 

both are



                                                                 greater than 

e



                                                                 linearity of 

e



                                                                 analyzer.



[facility] Tpgdvpfndisni6670-[facility]-01 00:26:39





             Test Item    Value        Reference Range Interpretation Comments

 

             LDH (test code = 4808629148) 546 U/L      300-600                  

 

 

             Lab Interpretation (test code = Normal                             

    



             87350-1)                                            



[facility] Pazqi3911-76-50 00:26:23





             Test Item    Value        Reference Range Interpretation Comments

 

             URIC ACID (test code = 2023302958) 7.0 mg/dL    2.9-6.0      H     

       

 

             Lab Interpretation (test code = Abnormal                           

    



             52626-5)                                            



[facility] Asvivtolzc2728-36-35 00:26:23





             Test Item    Value        Reference Range Interpretation Comments

 

             CREATININE (test code 1.03 mg/dL   0.50-1.04                 



             = 0301033030)                                        

 

             eGFR (test code =              mL/min/1.73m2              



             0008209541)                                         

 

             YESSENIA (test code = YESSENIA) Association of                           



                          Glomerular Filtration                           



                          Rate (GFR) and Staging                           



                          of Kidney Disease*                           



                          +-----------------------                           



                          +---------------------+-                           



                          ------------------------                           



                          +| GFR (mL/min/1.73 m2)                           



                          ?| With Kidney Damage ?|                           



                          ?Without Kidney                           



                          Damage+-----------------                           



                          ------+-----------------                           



                          ----+-------------------                           



                          ------+| ?>90 ? ? ? ? ?                           



                          ? ? ? ?| ?Stage one ? ?                           



                          ? ? ?| ? Normal ? ? ? ?                           



                          ? ? ?                                  



                          ?+----------------------                           



                          -+---------------------+                           



                          ------------------------                           



                          -+| ?60-89 ? ? ? ? ? ? ?                           



                          ?| ?Stage two ? ? ? ? ?|                           



                          ? Decreased GFR ? ? ? ?                           



                          +-----------------------                           



                          +---------------------+-                           



                          ------------------------                           



                          +| ?30-59 ? ? ? ? ? ? ?                           



                          ?| ?Stage three ? ? ? ?|                           



                          ? Stage three ? ? ? ? ?                           



                          +-----------------------                           



                          +---------------------+-                           



                          ------------------------                           



                          +| ?15-29 ? ? ? ? ? ? ?                           



                          ?| ?Stage four ? ? ? ? |                           



                          ? Stage four ? ? ? ? ?                           



                          ?+----------------------                           



                          -+---------------------+                           



                          ------------------------                           



                          -+| ?<15 (or dialysis) ?                           



                          ?| ?Stage five ? ? ? ? |                           



                          ? Stage five ? ? ? ? ?                           



                          ?+----------------------                           



                          -+---------------------+                           



                          ------------------------                           



                          -+ *Each stage assumes                           



                          the associated GFR level                           



                          has been in effect for                           



                          at least three months.                           



                          ?Stages 1 to 5, with or                           



                          without kidney disease,                           



                          indicate chronic kidney                           



                          disease. Notes:                           



                          Determination of stages                           



                          one and two (with eGFR                           



                          >59mL/min/1.73 m2)                           



                          requires estimation of                           



                          kidney damage for at                           



                          least three months as                           



                          defined by structural or                           



                          functional abnormalities                           



                          of the kidney,                           



                          manifested by                           



                          either:Pathological                           



                          abnormalities or Markers                           



                          of kidney damage                           



                          (including abnormalities                           



                          in the composition of                           



                          the blood or urine or                           



                          abnormalities in imaging                           



                          tests).                                



[facility] 
00:26:23





             Test Item    Value        Reference Range Interpretation Comments

 

             AST(SGOT) (test code = 8668834452) 24 U/L       13-40              

       

 

             Lab Interpretation (test code = Normal                             

    



             63435-1)                                            



[facility]2021 
00:26:23





             Test Item    Value        Reference Range Interpretation Comments

 

             ALTv (test code = 1742-6) 19 U/L       5-35                      

 

             Lab Interpretation (test code = Normal                             

    



             85733-5)                                            



[facility]22:28





             Test Item    Value        Reference Range Interpretation Comments

 

             APPEARANCE (test code = Cloudy       Clear        A            



             2134302817)                                         

 

             COLOR (test code = Ashley        Yellow       A            



             9563614498)                                         

 

             PH (test code =              4.8-8.0                   



             5900695768)                                         

 

             SP GRAVITY (test code =              1.003-1.030               



             9077197469)                                         

 

             GLU U QUAL (test code = Normal       Normal                    



             1004839272)                                         

 

             BLOOD (test code = 3+           Negative     A            



             4156895521)                                         

 

             KETONES (test code = Negative     Negative                  



             6327734148)                                         

 

             PROTEIN (test code = 100 mg/dL    Negative     A            



             2887-8)                                             

 

             UROBILIN (test code = Normal       Normal                    



             7688325285)                                         

 

             BILIRUBIN (test code = Negative     Negative                  



             4273193142)                                         

 

             NITRITE (test code = Negative     Negative                  



             2006746709)                                         

 

             LEUK OZZY (test code = 25/uL        Negative     A            



             9516518497)                                         

 

             RBC/HPF (test code = >182         See_Comment  H             [Autom

ated message]



             3277259665)                                         The system [employer]



                                                                 generated this



                                                                 result transmit

dieudonne



                                                                 reference range

: 0 -



                                                                 3 HPF. The refe

rence



                                                                 range was not u

sed



                                                                 to interpret th

is



                                                                 result as



                                                                 normal/abnormal

.

 

             WBC/HPF (test code =              See_Comment  H             [Autom

ated message]



             2011594551)                                         The system [employer]



                                                                 generated this



                                                                 result transmit

dieudonne



                                                                 reference range

: 0 -



                                                                 5 HPF. The refe

rence



                                                                 range was not u

sed



                                                                 to interpret th

is



                                                                 result as



                                                                 normal/abnormal

.

 

             BACTERIA (test code = Negative     Negative                  



             8078479898)                                         

 

             MUCOUS (test code = Marked       Negative LPF A            



             3235636038)                                         

 

             Lab Interpretation (test Abnormal                               



             code = 56158-1)                                        



[facility] HospitalCBC with Wucumxlyjdsm4417-73-24 00:18:18





             Test Item    Value        Reference Range Interpretation Comments

 

             WBC (test code =              See_Comment  H             [Automated



             6690-2)                                             message] The



                                                                 system which



                                                                 generated this



                                                                 result transmit

dieudonne



                                                                 reference range

:



                                                                 4.30 - 11.10



                                                                 10*3/?L. The



                                                                 reference range



                                                                 was not used to



                                                                 interpret this



                                                                 result as



                                                                 normal/abnormal

.

 

             RBC (test code =              See_Comment  L             [Automated



             789-8)                                              message] The



                                                                 system which



                                                                 generated this



                                                                 result transmit

dieudonne



                                                                 reference range

:



                                                                 3.93 - 5.25



                                                                 10*6/?L. The



                                                                 reference range



                                                                 was not used to



                                                                 interpret this



                                                                 result as



                                                                 normal/abnormal

.

 

             HGB (test code = 10.9 g/dL    11.6-15.0    L            



             718-7)                                              

 

             HCT (test code = 31.5 %       35.7-45.2    L            



             4544-3)                                             

 

             MCV (test code = 90.3 fL      80.6-95.5                 



             787-2)                                              

 

             MCH (test code = 31.2 pg      25.9-32.8                 



             785-6)                                              

 

             MCHC (test code = 34.6 g/dL    31.6-35.1                 



             786-4)                                              

 

             RDW-SD (test code = 40.3 fL      39.0-49.9                 



             23144-9)                                            

 

             RDW-CV (test code = 12.5 %       12.0-15.5                 



             788-0)                                              

 

             PLT (test code =              See_Comment  L             [Automated



             777-3)                                              message] The



                                                                 system which



                                                                 generated this



                                                                 result transmit

dieudonne



                                                                 reference range

:



                                                                 166 - 358 10*3/

?L.



                                                                 The reference



                                                                 range was not u

sed



                                                                 to interpret th

is



                                                                 result as



                                                                 normal/abnormal

.

 

             MPV (test code = 11.0 fL      9.5-12.9                  



             60668-2)                                            

 

             NRBC/100 WBC (test              See_Comment                [Automat

ed



             code = 9928154392)                                        message] 

The



                                                                 system which



                                                                 generated this



                                                                 result transmit

dieudonne



                                                                 reference range

:



                                                                 0.0 - 10.0 /100



                                                                 WBCs. The



                                                                 reference range



                                                                 was not used to



                                                                 interpret this



                                                                 result as



                                                                 normal/abnormal

.

 

             NRBC x10^3 (test code <0.01        See_Comment                [Auto

mated



             = [id number])                                        message] The



                                                                 system which



                                                                 generated this



                                                                 result transmit

dieudonne



                                                                 reference range

:



                                                                 10*3/?L. The



                                                                 reference range



                                                                 was not used to



                                                                 interpret this



                                                                 result as



                                                                 normal/abnormal

.

 

             GRAN MAT (NEUT) % 88.6 %                                 



             (test code = 770-8)                                        

 

             IMM GRAN % (test code 0.40 %                                 



             = 3735629445)                                        

 

             LYMPH % (test code = 6.1 %                                  



             736-9)                                              

 

             MONO % (test code = 4.6 %                                  



             5905-5)                                             

 

             EOS % (test code = 0.1 %                                  



             713-8)                                              

 

             BASO % (test code = 0.2 %                                  



             706-2)                                              

 

             GRAN MAT x10^3(ANC) 11.73 10*3/uL 1.88-7.09    H            



             (test code =                                        



             6758029402)                                         

 

             IMM GRAN x10^3 (test 0.05 10*3/uL 0.00-0.06                 



             code = 2554176668)                                        

 

             LYMPH x10^3 (test code 0.81 10*3/uL 1.32-3.29    L            



             = 731-0)                                            

 

             MONO x10^3 (test code 0.61 10*3/uL 0.33-0.92                 



             = 742-7)                                            

 

             EOS x10^3 (test code = <0.03        0.03-0.39    L            



             711-2)                                              

 

             BASO x10^3 (test code <0.03        0.01-0.07                 



             = 704-7)                                            

 

             Lab Interpretation Abnormal                               



             (test code = 99952-2)                                        



[facility]GG/[id number] 
18:40:09





             Test Item    Value        Reference Range Interpretation Comments

 

             Syphilis IgG/IgM (test Non-reactive Non-reactive              



             code = 57575-6)                                        

 

             YESSENIA (test code = YESSENIA)  Non-reactive - No                           



                          serologic evidence                           



                          of T. pallidum                           



                          infection. Cannot                           



                          exclude incubating                           



                          or early syphilis.                           



                          Submit a second                           



                          specimen in 2-4                           



                          weeks if syphilis is                           



                          clinically                             



                          suspected. Equivocal                           



                          - Further testing to                           



                          follow. Reactive -                           



                          Further testing to                           



                          follow.                                

 

             Lab Interpretation (test Normal                                 



             code = 68622-6)                                        



[facility] [id number] 05:10:15
Dianelys Crawford MD ? ? 5/3/2021 12:11 AM Central Neuraxial Block   Performed
by: Dianelys Crawford MDAuthorized by: Funmilayo Lopez MD  Reason for Block: 
?OB request, Patient request and Labor analgesiaStaff:  ?Resident/CRNA: ?Tung Giron MD patient identified, IV checked, risks and benefits explained, 
monitors and equipment checked, timeout performed, ob surgical consent/approval,
pre-op evaluation, surgical consent, site marked and anesthesia consentEpidural:
 ?Patient Position: ?Sitting ?Prep: Betadine ? ?Monitoring: ?Heart rate, 
continuous pulse ox, fetal heart rate / toco and NIBP ?Location: ?Lumbar (1-5) 
?Lumbar: ?L3-L4 ?Approach: ?MidlineNeedle and Epidural Catheter:  ?Epidural Kit:
?BBraun ?Needle Type: ?Tuohy ?Needle Length: ?3.5 in (8.89 cm) ?Needle Insertion
Depth: ?7.5   Assessment:  ?Sensory Level: ?Below E48Pudqu:  ? Patient 
identified; pre-procedure verification.Patient prepped and draped in standard 
sterile fashion using betadine x 3Subcutaneous infiltration with 1% Lidocaine 
ANDRES at 7.5 cm; catheter secured at 12.5 cm with mastisol, tegaderm x2 and 3-inch
clear tape.Aspiration test negative x 3Test dose negativePatient tolerated 
procedure well with no immediate complications Epidural expectations; PCEA 
explained and fall precautions given.[facility]HIV [facility] 
AG-AB WITH [id number] 16:41:51





             Test Item    Value        Reference Range Interpretation Comments

 

             HIV          Negative     Negative                  



             Semi-quantitative                                        



             (test code =                                        



             06550-1)                                            

 

             YESSENIA (test code = Non-reactive for HIV-1                           



             YESSENIA)         antigen and HIV-1/HIV-2                           



                          antibodies. ?No                           



                          laboratory evidence of                           



                          HIV infection. ?Repeat in                           



                          2-4 weeks if acute HIV                           



                          infection is suspected.                           



[facility] HospitalHepatitis B Surface [id number] 16:32:31





             Test Item    Value        Reference Range Interpretation Comments

 

             HBsAg Semi-Quantitative (test code = Negative     Negative         

         



             5195-3)                                             



[facility]Aspartate [facility] ; SGOT)
2021 16:00:11





             Test Item    Value        Reference Range Interpretation Comments

 

             AST(SGOT) (test code = 2497890372) 33 U/L       13-40              

       

 

             Lab Interpretation (test code = Normal                             

    



             80863-0)                                            



[facility]Alanine [facility] ; SGPT)
2021 16:00:11





             Test Item    Value        Reference Range Interpretation Comments

 

             ALTv (test code = 1742-6) 27 U/L       5-35                      

 

             Lab Interpretation (test code = Normal                             

    



             85110-0)                                            



[facility] [id number] 16:00:11





             Test Item    Value        Reference Range Interpretation Comments

 

             CREATININE (test code 0.77 mg/dL   0.50-1.04                 



             = 0999372204)                                        

 

             eGFR (test code =              mL/min/1.73m2              



             5832102078)                                         

 

             YESSENIA (test code = YESSENIA) Association of                           



                          Glomerular Filtration                           



                          Rate (GFR) and Staging                           



                          of Kidney Disease*                           



                          +-----------------------                           



                          +---------------------+-                           



                          ------------------------                           



                          +| GFR (mL/min/1.73 m2)                           



                          ?| With Kidney Damage ?|                           



                          ?Without Kidney                           



                          Damage+-----------------                           



                          ------+-----------------                           



                          ----+-------------------                           



                          ------+| ?>90 ? ? ? ? ?                           



                          ? ? ? ?| ?Stage one ? ?                           



                          ? ? ?| ? Normal ? ? ? ?                           



                          ? ? ?                                  



                          ?+----------------------                           



                          -+---------------------+                           



                          ------------------------                           



                          -+| ?60-89 ? ? ? ? ? ? ?                           



                          ?| ?Stage two ? ? ? ? ?|                           



                          ? Decreased GFR ? ? ? ?                           



                          +-----------------------                           



                          +---------------------+-                           



                          ------------------------                           



                          +| ?30-59 ? ? ? ? ? ? ?                           



                          ?| ?Stage three ? ? ? ?|                           



                          ? Stage three ? ? ? ? ?                           



                          +-----------------------                           



                          +---------------------+-                           



                          ------------------------                           



                          +| ?15-29 ? ? ? ? ? ? ?                           



                          ?| ?Stage four ? ? ? ? |                           



                          ? Stage four ? ? ? ? ?                           



                          ?+----------------------                           



                          -+---------------------+                           



                          ------------------------                           



                          -+| ?<15 (or dialysis) ?                           



                          ?| ?Stage five ? ? ? ? |                           



                          ? Stage five ? ? ? ? ?                           



                          ?+----------------------                           



                          -+---------------------+                           



                          ------------------------                           



                          -+ *Each stage assumes                           



                          the associated GFR level                           



                          has been in effect for                           



                          at least three months.                           



                          ?Stages 1 to 5, with or                           



                          without kidney disease,                           



                          indicate chronic kidney                           



                          disease. Notes:                           



                          Determination of stages                           



                          one and two (with eGFR                           



                          >59mL/min/1.73 m2)                           



                          requires estimation of                           



                          kidney damage for at                           



                          least three months as                           



                          defined by structural or                           



                          functional abnormalities                           



                          of the kidney,                           



                          manifested by                           



                          either:Pathological                           



                          abnormalities or Markers                           



                          of kidney damage                           



                          (including abnormalities                           



                          in the composition of                           



                          the blood or urine or                           



                          abnormalities in imaging                           



                          tests).                                



[facility] HospitalUric Acid Nranh9214-84-91 16:00:11





             Test Item    Value        Reference Range Interpretation Comments

 

             URIC ACID (test code = 3466525989) 7.3 mg/dL    2.9-6.0      H     

       

 

             Lab Interpretation (test code = Abnormal                           

    



             05342-4)                                            



[facility] HospitalLactate Varnntcmzozpr5100-23-67 16:00:10





             Test Item    Value        Reference Range Interpretation Comments

 

             LDH (test code = 6869003738) 599 U/L      300-600                  

 

 

             Lab Interpretation (test code = Normal                             

    



             13376-0)                                            



[facility] HospitalUrinalysis2021-[facility]-02 15:46:35





             Test Item    Value        Reference Range Interpretation Comments

 

             APPEARANCE (test code = Cloudy       Clear        A            



             6508798421)                                         

 

             COLOR (test code = Yellow       Yellow                    



             7301015191)                                         

 

             PH (test code =              4.8-8.0                   



             9998742014)                                         

 

             SP GRAVITY (test code =              1.003-1.030               



             6055602274)                                         

 

             GLU U QUAL (test code = Normal       Normal                    



             4121028945)                                         

 

             BLOOD (test code = Negative     Negative                  



             9609220778)                                         

 

             KETONES (test code = Negative     Negative                  



             7397029668)                                         

 

             PROTEIN (test code = Negative     Negative                  



             2887-8)                                             

 

             UROBILIN (test code = Normal       Normal                    



             0057130674)                                         

 

             BILIRUBIN (test code = Negative     Negative                  



             9335119107)                                         

 

             NITRITE (test code = Negative     Negative                  



             1172549059)                                         

 

             LEUK OZZY (test code = 500/uL       Negative     A            



             2468838112)                                         

 

             RBC/HPF (test code =              See_Comment  H             [Autom

ated message]



             7571351891)                                         The system [employer]



                                                                 generated this 

result



                                                                 transmitted ref

erence



                                                                 range: 0 - 3 HP

F. The



                                                                 reference range

 was



                                                                 not used to int

erpret



                                                                 this result as



                                                                 normal/abnormal

.

 

             WBC/HPF (test code =              See_Comment  H             [Autom

ated message]



             2979148454)                                         The system [employer]



                                                                 generated this 

result



                                                                 transmitted ref

erence



                                                                 range: 0 - 5 HP

F. The



                                                                 reference range

 was



                                                                 not used to int

erpret



                                                                 this result as



                                                                 normal/abnormal

.

 

             BACTERIA (test code = Many         Negative     A            



             2860522159)                                         

 

             MUCOUS (test code = Slight       Negative LPF A            



             5376967578)                                         

 

             SQ EPITH (test code =              See_Comment  H             [Auto

mated message]



             7714367773)                                         The system [employer]



                                                                 generated this 

result



                                                                 transmitted ref

erence



                                                                 range: <=2 HPF.

 The



                                                                 reference range

 was



                                                                 not used to int

erpret



                                                                 this result as



                                                                 normal/abnormal

.

 

             ASCORBIC ACID (test code Negative                               



             = 9915183426)                                        

 

             Lab Interpretation (test Abnormal                               



             code = 65031-9)                                        



[facility] HospitalType [facility] Screen - ONCE [id number] 15:35:45





             Test Item    Value        Reference Range Interpretation Comments

 

             ABO & RH (test code A POSITIVE                             Performe

d at [facility]



             = 20)                                               Laboratory Serv

ices -



                                                                 [facility]

[address] [place] [address]



                                                                 [address]:



                                                                 [phone]

A No.



                                                                 [id number]

 

             IAT (test code = Negative                               Performed a

t [facility]



             1185)                                               Laboratory Serv

ices -



                                                                 [facility]

[address] [place] [address]

s



                                                                 [address] Free:



                                                                 [phone]

A No.



                                                                 [id number]



[facility] HospitalProtein Creat Ratio Urine [id number] 
15:25:07





             Test Item    Value        Reference Range Interpretation Comments

 

             T. PROT U (test code = 2888-6) 14 mg/dL                            

   

 

             CREAT U (test code = 9702589352) 172.5 mg/dL                       

     

 

             Protein/Creatinine Ratio Urine              0.0-2.0                

   



             (test code = 3504965313)                                        



[facility] HospitalCBC with Gtwydbzlbppe7505-94-09 15:14:28





             Test Item    Value        Reference Range Interpretation Comments

 

             WBC (test code =              See_Comment                [Automated



             6390-2)                                             message] The sy

stem



                                                                 which generated



                                                                 this result



                                                                 transmitted



                                                                 reference range

:



                                                                 4.30 - 11.10



                                                                 10*3/?L. The



                                                                 reference range

 was



                                                                 not used to



                                                                 interpret this



                                                                 result as



                                                                 normal/abnormal

.

 

             RBC (test code =              See_Comment  L             [Automated



             469-8)                                              message] The sy

stem



                                                                 which generated



                                                                 this result



                                                                 transmitted



                                                                 reference range

:



                                                                 3.93 - 5.25



                                                                 10*6/?L. The



                                                                 reference range

 was



                                                                 not used to



                                                                 interpret this



                                                                 result as



                                                                 normal/abnormal

.

 

             HGB (test code = 11.4 g/dL    11.6-15.0    L            



             718-7)                                              

 

             HCT (test code = 32.7 %       35.7-45.2    L            



             4544-3)                                             

 

             MCV (test code = 91.1 fL      80.6-95.5                 



             787-2)                                              

 

             MCH (test code = 31.8 pg      25.9-32.8                 



             785-6)                                              

 

             MCHC (test code = 34.9 g/dL    31.6-35.1                 



             786-4)                                              

 

             RDW-SD (test code = 40.4 fL      39.0-49.9                 



             72027-7)                                            

 

             RDW-CV (test code = 12.4 %       12.0-15.5                 



             788-0)                                              

 

             PLT (test code =              See_Comment                [Automated



             777-3)                                              message] The sy

stem



                                                                 which generated



                                                                 this result



                                                                 transmitted



                                                                 reference range

:



                                                                 166 - 358 10*3/

?L.



                                                                 The reference r

alan



                                                                 was not used to



                                                                 interpret this



                                                                 result as



                                                                 normal/abnormal

.

 

             MPV (test code = 11.2 fL      9.5-12.9                  



             82278-8)                                            

 

             NRBC/100 WBC (test              See_Comment                [Automat

ed



             code = [id number])                                        message] 

The system



                                                                 which generated



                                                                 this result



                                                                 transmitted



                                                                 reference range

:



                                                                 0.0 - 10.0 /100



                                                                 WBCs. The refer

ence



                                                                 range was not u

sed



                                                                 to interpret th

is



                                                                 result as



                                                                 normal/abnormal

.

 

             NRBC x10^3 (test code <0.01        See_Comment                [Auto

mated



             = [id number])                                        message] The s

ystem



                                                                 which generated



                                                                 this result



                                                                 transmitted



                                                                 reference range

:



                                                                 10*3/?L. The



                                                                 reference range

 was



                                                                 not used to



                                                                 interpret this



                                                                 result as



                                                                 normal/abnormal

.

 

             GRAN MAT (NEUT) % 66.0 %                                 



             (test code = 770-8)                                        

 

             IMM GRAN % (test code 0.30 %                                 



             = [id number])                                        

 

             LYMPH % (test code = 24.6 %                                 



             736-9)                                              

 

             MONO % (test code = 7.6 %                                  



             5905-5)                                             

 

             EOS % (test code = 1.1 %                                  



             713-8)                                              

 

             BASO % (test code = 0.4 %                                  



             706-2)                                              

 

             GRAN MAT x10^3(ANC) 6.51 10*3/uL 1.88-7.09                 



             (test code =                                        



             8102935336)                                         

 

             IMM GRAN x10^3 (test 0.03 10*3/uL 0.00-0.06                 



             code = [id number])                                        

 

             LYMPH x10^3 (test code 2.43 10*3/uL 1.32-3.29                 



             = 731-0)                                            

 

             MONO x10^3 (test code 0.75 10*3/uL 0.33-0.92                 



             = 742-7)                                            

 

             EOS x10^3 (test code = 0.11 10*3/uL 0.03-0.39                 



             711-2)                                              

 

             BASO x10^3 (test code 0.04 10*3/uL 0.01-0.07                 



             = 704-7)                                            

 

             Lab Interpretation Abnormal                               



             (test code = 42715-5)                                        



[facility] 
[facility]03





             Test Item    Value        Reference Range Interpretation Comments

 

             SARS-CoV-2 Rapid ID NOW Not Detected Not Detected              



             (test code = 04818-3)                                        

 

             YESSENIA (test code = YESSENIA) ID NOW COVID-19 Assay                        

   



                          is an isothermal                           



                          nucleic acid                           



                          amplification test                           



                          intended for the                           



                          qualitative detection                           



                          of nucleic acid from                           



                          SARS-CoV-2 viral RNA                           



                          in nasopharyngeal (NP)                           



                          specimens. It is used                           



                          under Emergency Use                           



                          Authorization (EUA) by                           



                          FDA. The limit of                           



                          detection (LOD) of the                           



                          assay is 125 Genome                           



                          Equivalents/mL. A                           



                          positive result is                           



                          indicative of the                           



                          presence of SARS-CoV-2                           



                          RNA. ?Clinical                           



                          correlation with                           



                          patient history and                           



                          other diagnostic                           



                          information is                           



                          necessary to determine                           



                          patient infection                           



                          status. A negative                           



                          (Not Detected) result                           



                          does not preclude                           



                          SARS-CoV-2 infection.                           



                          In patients with                           



                          clinical symptoms and                           



                          other tests that are                           



                          consistent with                           



                          SARS-CoV-2 infection,                           



                          negative results                           



                          should be treated as                           



                          presumptive negative                           



                          and a new specimen                           



                          should be tested with                           



                          alternative PCR                           



                          molecular test.                           



                          Invalid: Please                           



                          collect a new specimen                           



                          for repeat patient                           



                          testing if clinically                           



                          indicated.                             

 

             Lab Interpretation Normal                                 



             (test code = 17780-8)                                        



[facility] URINALYSIS W/O SPECIFIC [id number]
21:24:00





             Test Item    Value        Reference Range Interpretation Comments

 

             POCT PH U (test code = 3254) .            5-8                      

 

 

             POCT U LEUK EST (test code = .            Negative - Negative      

        



             3263)                                               

 

             POCT U NIT (test code = 3262) .            Negative - Negative     

         

 

             POCT U PROT (test code = 3259) negative     Negative - Negative    

          

 

             POCT U GLU (test code = 3256) negative     Negative - Negative     

         

 

             POCT U KETONE (test code = 3258) .            Negative - Negative  

            

 

             POCT U BLD (test code = 3257) .            Negative - Negative     

         



[facility] URINALYSIS W SPECIFIC [id number] 
19:30:00





             Test Item    Value        Reference Range Interpretation Comments

 

             POCT U SP GRAV (test code = .            1.005-1.025               



             3255)                                               

 

             POCT PH U (test code = 3254) .            5-8                      

 

 

             POCT U LEUK EST (test code = .            Negative - Negative      

        



             3263)                                               

 

             POCT U NIT (test code = 3262) .            Negative - Negative     

         

 

             POCT U PROT (test code = 3259) trace        Negative - Negative    

          

 

             POCT U GLU (test code = 3256) negative     Negative - Negative     

         

 

             POCT U KETONE (test code = 3258) .            Negative - Negative  

            

 

             POCT U UROBILI (test code = .            0.2-1                     



             3260)                                               

 

             POCT U BILI (test code = 3261) .            Negative - Negative    

          

 

             POCT U BLD (test code = 3257) .            Negative - Negative     

         

 

             POCT U COLOR (test code = 3266)                                    

    

 

             POCT U APPEAR (test code = 3267)                                   

     



[facility] URINALYSIS W SPECIFIC [id number] 
20:27:00





             Test Item    Value        Reference Range Interpretation Comments

 

             POCT U SP GRAV (test code = .            1.005-1.025               



             3255)                                               

 

             POCT PH U (test code = 3254) .            5-8                      

 

 

             POCT U LEUK EST (test code = .            Negative - Negative      

        



             3263)                                               

 

             POCT U NIT (test code = 3262) .            Negative - Negative     

         

 

             POCT U PROT (test code = 3259) trace        Negative - Negative    

          

 

             POCT U GLU (test code = 3256) negative     Negative - Negative     

         

 

             POCT U KETONE (test code = 3258) .            Negative - Negative  

            

 

             POCT U UROBILI (test code = .            0.2-1                     



             3260)                                               

 

             POCT U BILI (test code = 3261) .            Negative - Negative    

          

 

             POCT U BLD (test code = 3257) .            Negative - Negative     

         

 

             POCT U COLOR (test code = 3266)                                    

    

 

             POCT U APPEAR (test code = 3267)                                   

     



[facility] URINALYSIS W SPECIFIC [id number] 
19:50:00





             Test Item    Value        Reference Range Interpretation Comments

 

             POCT U SP GRAV (test code = 3255) .            1.005-1.025         

      

 

             POCT PH U (test code = 3254) .            5-8                      

 

 

             POCT U LEUK EST (test code = 3263) .            Negative - Negative

              

 

             POCT U NIT (test code = 3262) .            Negative - Negative     

         

 

             POCT U PROT (test code = 3259) trace        Negative - Negative    

          

 

             POCT U GLU (test code = 3256) neg          Negative - Negative     

         

 

             POCT U KETONE (test code = 3258) .            Negative - Negative  

            

 

             POCT U UROBILI (test code = 3260) .            0.2-1               

      

 

             POCT U BILI (test code = 3261) .            Negative - Negative    

          

 

             POCT U BLD (test code = 3257) .            Negative - Negative     

         

 

             POCT U COLOR (test code = 3266)                                    

    

 

             POCT U APPEAR (test code = 3267)                                   

     



[facility] URINALYSIS W SPECIFIC [id number] 
16:54:00





             Test Item    Value        Reference Range Interpretation Comments

 

             POCT U SP GRAV (test code = 3255) .            1.005-1.025         

      

 

             POCT PH U (test code = 3254) .            5-8                      

 

 

             POCT U LEUK EST (test code = 3263) .            Negative - Negative

              

 

             POCT U NIT (test code = 3262) .            Negative - Negative     

         

 

             POCT U PROT (test code = 3259) Trace        Negative - Negative    

          

 

             POCT U GLU (test code = 3256) Neg          Negative - Negative     

         

 

             POCT U KETONE (test code = 3258) .            Negative - Negative  

            

 

             POCT U UROBILI (test code = 3260) .            0.2-1               

      

 

             POCT U BILI (test code = 3261) .            Negative - Negative    

          

 

             POCT U BLD (test code = 3257) .            Negative - Negative     

         

 

             POCT U COLOR (test code = 3266)                                    

    

 

             POCT U APPEAR (test code = 3267)                                   

     



[facility] URINALYSIS W SPECIFIC [id number] 
16:54:00





             Test Item    Value        Reference Range Interpretation Comments

 

             POCT U SP GRAV (test code = 3255) .            1.005-1.025         

      

 

             POCT PH U (test code = 3254) .            5-8                      

 

 

             POCT U LEUK EST (test code = 3263) .            Negative - Negative

              

 

             POCT U NIT (test code = 3262) .            Negative - Negative     

         

 

             POCT U PROT (test code = 3259) Trace        Negative - Negative    

          

 

             POCT U GLU (test code = 3256) Neg          Negative - Negative     

         

 

             POCT U KETONE (test code = 3258) .            Negative - Negative  

            

 

             POCT U UROBILI (test code = 3260) .            0.2-1               

      

 

             POCT U BILI (test code = 3261) .            Negative - Negative    

          

 

             POCT U BLD (test code = 3257) .            Negative - Negative     

         

 

             POCT U COLOR (test code = 3266)                                    

    

 

             POCT U APPEAR (test code = 3267)                                   

     



[facility] URINALYSIS W SPECIFIC [id number] 
16:54:00





             Test Item    Value        Reference Range Interpretation Comments

 

             POCT U SP GRAV (test code = 3255) .            1.005-1.025         

      

 

             POCT PH U (test code = 3254) .            5-8                      

 

 

             POCT U LEUK EST (test code = 3263) .            Negative - Negative

              

 

             POCT U NIT (test code = 3262) .            Negative - Negative     

         

 

             POCT U PROT (test code = 3259) Trace        Negative - Negative    

          

 

             POCT U GLU (test code = 3256) Neg          Negative - Negative     

         

 

             POCT U KETONE (test code = 3258) .            Negative - Negative  

            

 

             POCT U UROBILI (test code = 3260) .            0.2-1               

      

 

             POCT U BILI (test code = 3261) .            Negative - Negative    

          

 

             POCT U BLD (test code = 3257) .            Negative - Negative     

         

 

             POCT U COLOR (test code = 3266)                                    

    

 

             POCT U APPEAR (test code = 3267)                                   

     



[facility] URINALYSIS W SPECIFIC [id number] 
14:45:00





             Test Item    Value        Reference Range Interpretation Comments

 

             POCT U SP GRAV (test code = 3255) .            1.005-1.025         

      

 

             POCT PH U (test code = 3254) .            5-8                      

 

 

             POCT U LEUK EST (test code = 3263) .            Negative - Negative

              

 

             POCT U NIT (test code = 3262) .            Negative - Negative     

         

 

             POCT U PROT (test code = 3259) trace        Negative - Negative    

          

 

             POCT U GLU (test code = 3256) neg          Negative - Negative     

         

 

             POCT U KETONE (test code = 3258) .            Negative - Negative  

            

 

             POCT U UROBILI (test code = 3260) .            0.2-1               

      

 

             POCT U BILI (test code = 3261) .            Negative - Negative    

          

 

             POCT U BLD (test code = 3257) .            Negative - Negative     

         

 

             POCT U COLOR (test code = 3266)                                    

    

 

             POCT U APPEAR (test code = 3267)                                   

     



[facility] URINALYSIS W SPECIFIC [id number] 
15:47:00





             Test Item    Value        Reference Range Interpretation Comments

 

             POCT U SP GRAV (test code = 3255) .            1.005-1.025         

      

 

             POCT PH U (test code = 3254) .            5-8                      

 

 

             POCT U LEUK EST (test code = 3263) .            Negative - Negative

              

 

             POCT U NIT (test code = 3262) .            Negative - Negative     

         

 

             POCT U PROT (test code = 3259) trace        Negative - Negative    

          

 

             POCT U GLU (test code = 3256) neg          Negative - Negative     

         

 

             POCT U KETONE (test code = 3258) .            Negative - Negative  

            

 

             POCT U UROBILI (test code = 3260) .            0.2-1               

      

 

             POCT U BILI (test code = 3261) .            Negative - Negative    

          

 

             POCT U BLD (test code = 3257) .            Negative - Negative     

         

 

             POCT U COLOR (test code = 3266) .                                  

    

 

             POCT U APPEAR (test code = 3267)                                   

     



[facility] URINALYSIS W SPECIFIC [id number] 
15:47:00





             Test Item    Value        Reference Range Interpretation Comments

 

             POCT U SP GRAV (test code = 3255) .            1.005-1.025         

      

 

             POCT PH U (test code = 3254) .            5-8                      

 

 

             POCT U LEUK EST (test code = 3263) .            Negative - Negative

              

 

             POCT U NIT (test code = 3262) .            Negative - Negative     

         

 

             POCT U PROT (test code = 3259) trace        Negative - Negative    

          

 

             POCT U GLU (test code = 3256) neg          Negative - Negative     

         

 

             POCT U KETONE (test code = 3258) .            Negative - Negative  

            

 

             POCT U UROBILI (test code = 3260) .            0.2-1               

      

 

             POCT U BILI (test code = 3261) .            Negative - Negative    

          

 

             POCT U BLD (test code = 3257) .            Negative - Negative     

         

 

             POCT U COLOR (test code = 3266) .                                  

    

 

             POCT U APPEAR (test code = 3267)                                   

     



[facility] URINALYSIS W SPECIFIC [id number] 
19:41:00





             Test Item    Value        Reference Range Interpretation Comments

 

             POCT U SP GRAV (test code = 3255) .            1.005-1.025         

      

 

             POCT PH U (test code = 3254) .            5-8                      

 

 

             POCT U LEUK EST (test code = 3263) .            Negative - Negative

              

 

             POCT U NIT (test code = 3262) .            Negative - Negative     

         

 

             POCT U PROT (test code = 3259) trace        Negative - Negative    

          

 

             POCT U GLU (test code = 3256) neg          Negative - Negative     

         

 

             POCT U KETONE (test code = 3258) .            Negative - Negative  

            

 

             POCT U UROBILI (test code = 3260) .            0.2-1               

      

 

             POCT U BILI (test code = 3261) .            Negative - Negative    

          

 

             POCT U BLD (test code = 3257) .            Negative - Negative     

         

 

             POCT U COLOR (test code = 3266)                                    

    

 

             POCT U APPEAR (test code = 3267)                                   

     



[facility] ONLY PAP SMEAR-LIQUID BASED2020-10-10 
19:55:00





             Test Item    Value        Reference Range Interpretation Comments

 

             Case Report (test code Gynecologic Cytology ?                      

     



             = [id number]) ? ? ? ? ? ? ? ? ? ? ? ?                           



                          ? ?Case: [id number] ?                           



                          ? ? ? ? ? ? ? ? ? ? ? ?                           



                          ? ? Authorizing                           



                          Provider: ?Donna Renner, [place] ?                           



                          ?Collected: ? ? ? ? ?                           



                          10/05/2020 1029 ? ? ? ?                           



                          ? ?Ordering Location: ?                           



                          ? [facility] ?                           



                          ? ? ? Received: ? ? ? ?                           



                          ? ?10/05/2020 2243 ? ?                           



                          ? ? ? ? ? ? ? ? ? ? ? ?                           



                          ? ? ? [place] ? ? ? ?                           



                          ? ? ? ? ? ? ? ? ? ? ? ?                           



                          ? ? ? ? ? ? ? ? ? ? ? ?                           



                          ? ? ? ? ? First Screen:                           



                          ? ? ? ? ?Kaitlin, Jeffrey J                           



                          ? ? ? ? ? ? ? ? ? ? ? ?                           



                          ? ? ? ? ? ? ? ? ? ? ? ?                           



                          ? ? ? ? ? ? Specimen: ?                           



                          ?Liquid Based Pap                           



                          Preparation, CERVIX ? ?                           



                          ? ? ? ? ? ? ? ? ? ? ? ?                           



                          ? ? ? ? ? ? ? ? ? ?                           

 

             Clinical Information Routine Pap Smear                           



             (test code =                                        



             2350096321)                                         

 

             Specimen Adequacy Satisfactory for                           



             (test code = 75552-5) Evaluation(Endocervical                      

     



                          /Transformation Zone                           



                          Component Absent)                           

 

             Interpretation (test Negative for                           



             code = 36381-3) intraepithelial lesion                           



                          or malignancy                           

 

             LMP (test code = Pregnancy                              



             7642421143)                                         

 

             Educational Note (test v9gdgZPlDCEda3gyJVCqsUD                     

      



             code = 8422478423) uZzEwMzNcZnRuYmpcdWMxIH                         

  



                          dpctQtSAqbj1WjN3NrZFKfK                           



                          FxhbnNpXGRlZmxhbmcxMDMz                           



                          OVZ3khSaEAFgJUmuBBMbWQb                           



                          vZj2hjQHvjSukUiDiTLQje4                           



                          qaguPTfeimrXu6t5bbXRTpT                           



                          hO5zGRfCPbqL0eawjEtoYMw                           



                          UOMrIQl5eF86PPJsoY2szHD                           



                          dIDelnqNhJrI9HVotYXKtJb                           



                          Y9KJYpvNVeLVJsC2iuUOIvU                           



                          WUfW4IdLJ8iKhjqOmj3ZST9                           



                          IDtccmVkMFxncmVlbjBcYmx                           



                          4EIIdR318SQB3pYgaj3adAK                           



                          S0MBMnLHYzLnRhXv2jtVBzQ                           



                          382BNFcHHBOOQDztHh4EAAq                           



                          klDyvuBlaQCLx943W403e0x                           



                          iLHRlarXxwZtQoazks6gtF5                           



                          19XHBhcGVydzEyMjQwXHBhc                           



                          FHqqFL5DUWzLC6vmniwMMnf                           



                          HYynJTRoskK7PUThpJZmS5A                           



                          aXUDoZJ6tjivhYIK1WBtnEQ                           



                          LdLFU8TaJeAZFkn5Znxhg4E                           



                          aJdbe3llp55OVY8e2RnnYsc                           



                          NAW7KZU0PlWgWk7qeTRzBZO                           



                          cVE1kDjBfrJMcMUXfjn21xU                           



                          ypXHusljQcoF4eCjZcANJfe                           



                          HUxFMZyQL6shYBlAPPdeC3q                           



                          cmxjXHBnYnJkcmhlYWRccGd                           



                          zviPpPg5ukIpmIVD2DMrcZ2                           



                          sxyK4uIiD9YSdhS9tluQ7kV                           



                          Bs7GBqytBL2CGZzpP1hEM8c                           



                          juwhs0xaRCcxVGfbDDQhvtJ                           



                          7iyA7YEUubYGlL6IntO0sRE                           



                          BjCC6mbqndo1xwKYS2JIjmA                           



                          FNuXZI5YkDrULZjy1Hgqgy2                           



                          HxCnf5NwnDXePAypY56zs45                           



                          8WREenzIyW3tmmFJsspodcI                           



                          ElkjovQQwbvsD6FHSyPHZcI                           



                          WluXGYxXGZzMjJcbGFuZzEw                           



                          MzNcaGljaFxmMVxkYmNoXGY                           



                          jVMuiL1dgOoHlU0IpTRUlKl                           



                          JcsMFAUEP3qSZhgEZlzUCxb                           



                          V3vvDGeXQQsBGCjl9AkEFiz                           



                          NNAgb9WtJHRdeE5aAUDit9S                           



                          exCUqiURgdGp9VZYpklVmxT                           



                          QsoN33pzFzFT1vVXFuDKImJ                           



                          RKwwoVgtUYhy1IpLfBMqLXf                           



                          jHEbdNJiLBSmEI4heS9jBAD                           



                          tiwXzFCR1aCSra0dtGYEef9                           



                          UrPrimhEJ4YH8pNHAznNZwF                           



                          P5rZ6U6oJGrCSIcMNHzTJfn                           



                          FM8vf4DrqVk1IAZdJCT4lJW                           



                          yYgElRpXowWkuicQtMA6yiL                           



                          eaFpJpojBmTi1ldX88WVDiA                           



                          L3sVNZvZImwxKNmkrJzQVJr                           



                          iE0dE0CsYYNzJ36zRIBcUIE                           



                          ihL8fxX1wdoWltlTvdsWjl2                           



                          1tUZ2yDRNuyN8guPsmfP7af                           



                          mUgdGhlIGVmZmVjdCBvZiBm                           



                          REmaFFOfOZvntWb0PSWlCIX                           



                          2hQPnXwOpQB00nyYfRYMdXK                           



                          81THUla4SiHSToDGRsER4xd                           



                          pJwVLY1goBqp91nkWa7WSmc                           



                          sSHmjA8kAEvxmMTurTJfSyI                           



                          qwIKzVDhiHNIcRM4uZPPxEO                           



                          55IHVuZXhwbGFpbmVkIGNsa                           



                          D1mS6UlNGHuV37yJFCkQWHw                           



                          rX0hmN6qxflpqhBaTESqgMI                           



                          zcyBvZiBhbnkgUGFwIFRlc3                           



                          RpybQrlTz0DfykuSOdrnkaB                           



                          VxmczIyXGxhbmcxMDMzXGhp                           



                          A6maQiZsONEahPdeHPyna0T                           



                          oXGYxXGZzMjJccGFyfX0=                           

 

             Embedded Images (test                                        



             code = 8815246401)                                        



[id number]





             Test Item    Value        Reference Range Interpretation Comments

 

             URINE CULTURE (test 10,000 - 100,000 CFU/mL                        

   



             code = 630-4) mixed aerobic organisms -                           



                          suggests endogenous                           



                          microbial contamination                           



[facility]2020[facility] 02:22:00





             Test Item    Value        Reference Range Interpretation Comments

 

             Trichomonas Nucleic Positive     Negative     A            



             Acid (test code =                                        



             77002-5)                                            

 

             YESSENIA (test code = YESSENIA) Reliable results are                         

  



                          dependent on adequate                           



                          specimen collection. ?                           



                          A positive result                           



                          obtained from a                           



                          patient after                           



                          therapeutic treatment                           



                          cannot be interpreted                           



                          as indicating the                           



                          presence of viable                           



                          organisms. ?For                           



                          patients on whom a                           



                          false positive result                           



                          may have adverse                           



                          psychosocial impact,                           



                          retesting is advised.                           



                          Indeterminate: Unable                           



                          to generate a valid                           



                          test result on this                           



                          specimen. ?Please                           



                          submit a new specimen                           



                          for repeat testing if                           



                          clinically indicated.                           



                          Trichomonas nucleic                           



                          acid amplification                           



                          testing (NAAT) has not                           



                          been validated for                           



                          medico-legal specimens                           



                          (sexual abuse in                           



                          maisha-pubertal and                           



                          pre-pubertal children,                           



                          sexual assault, and                           



                          legal cases). ?Wet                           



                          mount with microscopic                           



                          observation and                           



                          culture for                            



                          Trichomonas vaginalis                           



                          from clinically                           



                          appropriate sites are                           



                          the methods of choice                           



                          in these cases.                           



                          Results from this                           



                          testing should be                           



                          interpreted in                           



                          conjunction with other                           



                          laboratory and                           



                          clinical data                           



                          available to the                           



                          clinician.                             

 

             Lab Interpretation Abnormal                               



             (test code = 01955-2)                                        



[facility]2020-10-07 
01:42:00





             Test Item    Value        Reference Range Interpretation Comments

 

             C. trachomatis Nucleic Negative     Negative                  



             Acid (test code =                                        



             80184-8)                                            

 

             N. gonorrhoeae Nucleic Negative     Negative                  



             Acid (test code =                                        



             94634-5)                                            

 

             YESSENIA (test code = YESSENIA) Reliable results are                         

  



                          dependent on adequate                           



                          specimen collection. ?                           



                          A positive result                           



                          obtained from a                           



                          patient after                           



                          therapeutic treatment                           



                          cannot be interpreted                           



                          as indicating the                           



                          presence of viable                           



                          organisms. ?For                           



                          patients on whom a                           



                          false positive result                           



                          may have adverse                           



                          psychosocial impact,                           



                          retesting is advised.                           



                          Indeterminate: Unable                           



                          to generate a valid                           



                          test result on this                           



                          specimen. ?Please                           



                          submit a new specimen                           



                          for repeat testing if                           



                          clinically indicated.                           



                          Chlamydia                              



                          trachomatis/Neisseria                           



                          gonorrhoeae nucleic                           



                          acid amplification                           



                          testing (NAAT) has not                           



                          been validated for                           



                          medico-legal specimens                           



                          (sexual abuse in                           



                          maisha-pubertal and                           



                          pre-pubertal children,                           



                          sexual assault, and                           



                          legal cases). ?Culture                           



                          for Chlamydia                           



                          trachomatis and/or                           



                          Neisseria gonorrhoeae                           



                          from clinically                           



                          appropriate sites is                           



                          the method of choice                           



                          in these cases. ?                           



                          Results from this                           



                          testing should be                           



                          interpreted in                           



                          conjunction with other                           



                          laboratory and                           



                          clinical data                           



                          available to the                           



                          clinician.                             

 

             Lab Interpretation Normal                                 



             (test code = 40234-4)                                        



[facility]2020-10-06 15:05:00





             Test Item    Value        Reference Range Interpretation Comments

 

             Rubella screen IgG Equivocal    Negative                  



             (test code =                                        



             1824896777)                                         

 

             YESSENIA (test code = YESSENIA) Positive - Indicates the                     

      



                          patient was exposed to                           



                          Rubella through                           



                          infection or                           



                          vaccination.Negative -                           



                          Indicates the patient                           



                          could be susceptible to                           



                          Rubella                                



                          infection.Equivocal - A                           



                          second specimen should                           



                          be sent.                               



[facility]2020-10-[facility] 15:05:00





             Test Item    Value        Reference Range Interpretation Comments

 

             VZV IgG antibody Negative     Negative                  



             (test code = 28996-0)                                        

 

             YESSENIA (test code = YESSENIA) Positive - Indicates the                     

      



                          patient was exposed to                           



                          VZV through infection or                           



                          vaccination.Negative -                           



                          Indicates the patient                           



                          could be susceptible to                           



                          VZV infection.Equivocal                           



                          - A second specimen                           



                          should be sent for                           



                          testing.                               



[facility] ONLY - SYPHILIS IGG/IGM2020-10-06 
14:18:00





             Test Item    Value        Reference Range Interpretation Comments

 

             Syphilis IgG/IgM (test Non-reactive Non-reactive              



             code = 10377-5)                                        

 

             YESSENIA (test code = YESSENIA)  Non-reactive - No                           



                          serologic evidence                           



                          of T. pallidum                           



                          infection. Cannot                           



                          exclude incubating                           



                          or early syphilis.                           



                          Submit a second                           



                          specimen in 2-4                           



                          weeks if syphilis is                           



                          clinically                             



                          suspected. Equivocal                           



                          - Further testing to                           



                          follow. Reactive -                           



                          Further testing to                           



                          follow.                                

 

             Lab Interpretation (test Normal                                 



             code = 10689-8)                                        



[facility]2020[facility]10-[facility] 06:55:00





             Test Item    Value        Reference Range Interpretation Comments

 

             CoV-2 IgG (test code Negative     Negative                  Negativ

e result



             = 45964-6)                                          does not rule o

ut



                                                                 acute SARS-CoV-

2



                                                                 infection.



                                                                 Clinical



                                                                 correlation as



                                                                 well as molecul

ar



                                                                 diagnostic test



                                                                 are recommended



                                                                 to rule out acu

te



                                                                 infection if



                                                                 clinically



                                                                 indicated.

 

             YESSENIA (test code = YESSENIA) This test has                           



                          been approved by                           



                          FDA for                                



                          emergency use.                           

 

             Lab Interpretation Normal                                 



             (test code = 43701-0)                                        



[facility] 05:55:00





             Test Item    Value        Reference Range Interpretation Comments

 

             HIV          Negative     Negative                  



             Semi-quantitative                                        



             (test code =                                        



             04554-0)                                            

 

             YESSENIA (test code = Non-reactive for HIV-1                           



             YESSENIA)         antigen and HIV-1/HIV-2                           



                          antibodies. ?No                           



                          laboratory evidence of                           



                          HIV infection. ?Repeat in                           



                          2-4 weeks if acute HIV                           



                          infection is suspected.                           



[facility] [id number] 04:31:32





             Test Item    Value        Reference Range Interpretation Comments

 

             ABO & RH (test code A POSITIVE                             Performe

d at [facility]



             = 20)                                               Laboratory Serv

ices -



                                                                 [facility]

[address]



                                                                 [facility]

s



                                                                 [id number] Free:



                                                                 [phone]

A No.



                                                                 [id number]

 

             IAT (test code = Negative                               Performed a

t [facility]



             1185)                                               Laboratory Serv

ices -



                                                                 [facility]





                                                                 [facility]

s



                                                                 [id number] Free:



                                                                 [phone]

A No.



                                                                 [id number]



[facility]2020-10-06 04:03:00





             Test Item    Value        Reference Range Interpretation Comments

 

             HBsAg Semi-Quantitative (test code = Negative     Negative         

         



             5195-3)                                             



[facility] [id number] 
03:22:00





             Test Item    Value        Reference Range Interpretation Comments

 

             GLUC 1 HR (test code = 6872037348) 105 mg/dL    120-170      L     

       

 

             Lab Interpretation (test code = Abnormal                           

    



             79633-5)                                            



[facility]08[facility]00





             Test Item    Value        Reference Range Interpretation Comments

 

             WBC (test code =              See_Comment                [Automated



             7814-2)                                             message] The sy

stem



                                                                 which generated



                                                                 this result



                                                                 transmitted



                                                                 reference range

:



                                                                 4.30 - 11.10



                                                                 10*3/?L. The



                                                                 reference range

 was



                                                                 not used to



                                                                 interpret this



                                                                 result as



                                                                 normal/abnormal

.

 

             RBC (test code =              See_Comment                [Automated



             680-8)                                              message] The sy

stem



                                                                 which generated



                                                                 this result



                                                                 transmitted



                                                                 reference range

:



                                                                 3.93 - 5.25



                                                                 10*6/?L. The



                                                                 reference range

 was



                                                                 not used to



                                                                 interpret this



                                                                 result as



                                                                 normal/abnormal

.

 

             HGB (test code = 12.4 g/dL    11.6-15                   



             718-7)                                              

 

             HCT (test code = 36.0 %       35.7-45.2                 



             4544-3)                                             

 

             MCV (test code = 89.3 fL      80.6-95.5                 



             787-2)                                              

 

             MCH (test code = 30.8 pg      25.9-32.8                 



             785-6)                                              

 

             MCHC (test code = 34.4 g/dL    31.6-35.1                 



             786-4)                                              

 

             RDW-SD (test code = 38.5 fL      39-49.9      L            



             07059-6)                                            

 

             RDW-CV (test code = 11.9 %       12-15.5      L            



             788-0)                                              

 

             PLT (test code =              See_Comment                [Automated



             777-3)                                              message] The sy

stem



                                                                 which generated



                                                                 this result



                                                                 transmitted



                                                                 reference range

:



                                                                 166 - 358 10*3/

?L.



                                                                 The reference r

alan



                                                                 was not used to



                                                                 interpret this



                                                                 result as



                                                                 normal/abnormal

.

 

             MPV (test code = 10.5 fL      9.5-12.9                  



             15669-1)                                            

 

             NRBC/100 WBC (test              See_Comment                [Automat

ed



             code = 7284358403)                                        message] 

The system



                                                                 which generated



                                                                 this result



                                                                 transmitted



                                                                 reference range

:



                                                                 0.0 - 10.0 /100



                                                                 WBCs. The refer

ence



                                                                 range was not u

sed



                                                                 to interpret th

is



                                                                 result as



                                                                 normal/abnormal

.

 

             NRBC x10^3 (test code <0.01        See_Comment                [Auto

mated



             = 9122442698)                                        message] The s

ystem



                                                                 which generated



                                                                 this result



                                                                 transmitted



                                                                 reference range

:



                                                                 10*3/?L. The



                                                                 reference range

 was



                                                                 not used to



                                                                 interpret this



                                                                 result as



                                                                 normal/abnormal

.

 

             GRAN MAT (NEUT) % 58.8 %                                 



             (test code = 770-8)                                        

 

             IMM GRAN % (test code 0.30 %                                 



             = 7300118361)                                        

 

             LYMPH % (test code = 30.1 %                                 



             736-9)                                              

 

             MONO % (test code = 8.4 %                                  



             5905-5)                                             

 

             EOS % (test code = 1.9 %                                  



             713-8)                                              

 

             BASO % (test code = 0.5 %                                  



             706-2)                                              

 

             GRAN MAT x10^3(ANC) 4.31 10*3/uL 1.88-7.09                 



             (test code =                                        



             6921164916)                                         

 

             IMM GRAN x10^3 (test <0.03        0-0.06                    



             code = 1661127380)                                        

 

             LYMPH x10^3 (test code 2.21 10*3/uL 1.32-3.29                 



             = 731-0)                                            

 

             MONO x10^3 (test code 0.62 10*3/uL 0.33-0.92                 



             = 742-7)                                            

 

             EOS x10^3 (test code = 0.14 10*3/uL 0.03-0.39                 



             711-2)                                              

 

             BASO x10^3 (test code 0.04 10*3/uL 0.01-0.07                 



             = 704-7)                                            

 

             Lab Interpretation Abnormal                               



             (test code = 30309-5)                                        



[facility] PREGNANCY TEST2020-10-05 14:16:00





             Test Item    Value        Reference Range Interpretation Comments

 

             POCT PREG (test code = 1605) Positive                              

 

 

             On board controls acceptable with C Yes                            

        



             Line (test code = 3574)                                        

 

             POCT PREG LOT # (test code = 3575)                                 

       

 

             POCT PREG TEST  DATE (test                                   

     



             code = 357)                                        



[facility] URINALYSIS W/O SPECIFIC GRAVITY2020-10-05
14:16:00





             Test Item    Value        Reference Range Interpretation Comments

 

             POCT PH U (test code = 3254) 6 mg/dl      5-8                      

 

 

             POCT U LEUK EST (test code = 2+           Negative - Negative      

        



             3263)                                               

 

             POCT U NIT (test code = 3262) neg          Negative - Negative     

         

 

             POCT U PROT (test code = 3259) 1+           Negative - Negative    

          

 

             POCT U GLU (test code = 3256) neg          Negative - Negative     

         

 

             POCT U KETONE (test code = 3258) neg          Negative - Negative  

            

 

             POCT U BLD (test code = 3257) neg          Negative - Negative     

         



[facility] PREGNANCY TEST2020-10-05 14:16:00





             Test Item    Value        Reference Range Interpretation Comments

 

             POCT PREG (test code = 1605) Positive                              

 

 

             On board controls acceptable with C Yes                            

        



             Line (test code = 3574)                                        

 

             POCT PREG LOT # (test code = 3575)                                 

       

 

             POCT PREG TEST  DATE (test                                   

     



             code = 357)                                        



[facility] URINALYSIS W/O SPECIFIC GRAVITY2020-10-05
14:16:00





             Test Item    Value        Reference Range Interpretation Comments

 

             POCT PH U (test code = 3254) 6 mg/dl      5-8                      

 

 

             POCT U LEUK EST (test code = 2+           Negative - Negative      

        



             3263)                                               

 

             POCT U NIT (test code = 3262) neg          Negative - Negative     

         

 

             POCT U PROT (test code = 3259) 1+           Negative - Negative    

          

 

             POCT U GLU (test code = 3256) neg          Negative - Negative     

         

 

             POCT U KETONE (test code = 3258) neg          Negative - Negative  

            

 

             POCT U BLD (test code = 3257) neg          Negative - Negative     

         



[facility] PREGNANCY TEST2020-10-05 14:16:00





             Test Item    Value        Reference Range Interpretation Comments

 

             POCT PREG (test code = 1605) Positive                              

 

 

             On board controls acceptable with C Yes                            

        



             Line (test code = 3574)                                        

 

             POCT PREG LOT # (test code = 3575)                                 

       

 

             POCT PREG TEST  DATE (test                                   

     



             code = 3576)                                        



[facility] URINALYSIS W/O SPECIFIC GRAVITY2020-10-05
14:16:00





             Test Item    Value        Reference Range Interpretation Comments

 

             POCT PH U (test code = 3254) 6 mg/dl      5-8                      

 

 

             POCT U LEUK EST (test code = 2+           Negative - Negative      

        



             3263)                                               

 

             POCT U NIT (test code = 3262) neg          Negative - Negative     

         

 

             POCT U PROT (test code = 3259) 1+           Negative - Negative    

          

 

             POCT U GLU (test code = 3256) neg          Negative - Negative     

         

 

             POCT U KETONE (test code = 3258) neg          Negative - Negative  

            

 

             POCT U BLD (test code = 3257) neg          Negative - Negative     

         



[facility]CG (QUANTITATIVE)2020 23:17:00





             Test Item    Value        Reference Range Interpretation Comments

 

             BETA HCG (test              See_Comment                [Automated m

essage]



             code =                                              The system Actions

h



             8585590874)                                         generated this



                                                                 result transmit

dieudonne



                                                                 reference range

:



                                                                 Non-pregnant fe

male



                                                                 and male patien

ts:



                                                                 <5 mIU/mL. The



                                                                 reference range

 was



                                                                 not used to



                                                                 interpret this



                                                                 result as



                                                                 normal/abnormal

.

 

             YESSENIA (test code  Gestational Age ? ?                           



             = YESSENIA)       ? ? ? ? ?Range                           



                          (mIU/mL) 1-10 ?Weeks                           



                          ? ? ? ? ? ? ? ?                           



                          ?[id number]                           



                          Weeks ? ? ? ? ? ? ?                           



                          ? ?[id number]                           



                          Weeks ? ? ? ? ? ? ?                           



                          ? ?[id number]                           



                          Weeks ? ? ? ? ? ? ?                           



                          ? ?[phone]                           



                          Biotin has been                           



                          reported to cause a                           



                          negative bias,                           



                          interpret results                           



                          relative to                            



                          patient's use of                           



                          biotin.                                



[facility]. METABOLIC PANEL (35306)2020 
21:27:00





             Test Item    Value        Reference Range Interpretation Comments

 

             NA (test code = 137 mmol/L   135-145                   



             4198546108)                                         

 

             K (test code = 4.3 mmol/L   3.5-5                     



             7901820278)                                         

 

             CL (test code = 103 mmol/L                       



             1909181963)                                         

 

             CO2 TOTAL (test code = 25 mmol/L    23-31                     



             7491981188)                                         

 

             AGAP (test code =              2-16                      



             9321448583)                                         

 

             BUN (test code = 11 mg/dL     7-23                      



             2802078775)                                         

 

             GLUCOSE (test code = 90 mg/dL                         



             5035907731)                                         

 

             CREATININE (test code = 0.54 mg/dL   0.5-1.04                  



             7445609613)                                         

 

             TOTAL BILI (test code = 0.4 mg/dL    0.1-1.1                   



             6379067948)                                         

 

             CALCIUM (test code = 9.4 mg/dL    8.6-10.6                  



             4808068750)                                         

 

             T PROTEIN (test code = 7.1 g/dL     6.3-8.2                   



             8134229240)                                         

 

             ALBUMIN (test code = 4.2 g/dL     3.5-5                     



             5658553308)                                         

 

             ALK PHOS (test code = 29 U/L              L            



             1173735260)                                         

 

             ALTv (test code = 14 U/L       5-35                      



             1742-6)                                             

 

             AST(SGOT) (test code = 21 U/L       13-40                     



             7947672260)                                         

 

             eGFR Calculation              mL/min/1.73m2              



             (Non-)                                        



             (test code =                                        



             4703496549)                                         

 

             eGFR Calculation              mL/min/1.73m2              



             (African American)                                        



             (test code =                                        



             5828094958)                                         

 

             YESSENIA (test code = YESSENIA) Association of                           



                          Glomerular Filtration                           



                          Rate (GFR) and Staging                           



                          of Kidney Disease*                           



                          +---------------------                           



                          --+-------------------                           



                          --+-------------------                           



                          ------+| GFR                           



                          (mL/min/1.73 m2) ?|                           



                          With Kidney Damage ?|                           



                          ?Without Kidney                           



                          Damage+---------------                           



                          --------+-------------                           



                          --------+-------------                           



                          ------------+| ?>90 ?                           



                          ? ? ? ? ? ? ? ?|                           



                          ?Stage one ? ? ? ? ?|                           



                          ? Normal ? ? ? ? ? ? ?                           



                          ?+--------------------                           



                          ---+------------------                           



                          ---+------------------                           



                          -------+| ?60-89 ? ? ?                           



                          ? ? ? ? ?| ?Stage two                           



                          ? ? ? ? ?| ? Decreased                           



                          GFR ? ? ? ?                            



                          +---------------------                           



                          --+-------------------                           



                          --+-------------------                           



                          ------+| ?30-59 ? ? ?                           



                          ? ? ? ? ?| ?Stage                           



                          three ? ? ? ?| ? Stage                           



                          three ? ? ? ? ?                           



                          +---------------------                           



                          --+-------------------                           



                          --+-------------------                           



                          ------+| ?15-29 ? ? ?                           



                          ? ? ? ? ?| ?Stage four                           



                          ? ? ? ? | ? Stage four                           



                          ? ? ? ? ?                              



                          ?+--------------------                           



                          ---+------------------                           



                          ---+------------------                           



                          -------+| ?<15 (or                           



                          dialysis) ? ?| ?Stage                           



                          five ? ? ? ? | ? Stage                           



                          five ? ? ? ? ?                           



                          ?+--------------------                           



                          ---+------------------                           



                          ---+------------------                           



                          -------+ *Each stage                           



                          assumes the associated                           



                          GFR level has been in                           



                          effect for at least                           



                          three months. ?Stages                           



                          1 to 5, with or                           



                          without kidney                           



                          disease, indicate                           



                          chronic kidney                           



                          disease. Notes:                           



                          Determination of                           



                          stages one and two                           



                          (with eGFR                             



                          >59mL/min/1.73 m2)                           



                          requires estimation of                           



                          kidney damage for at                           



                          least three months as                           



                          defined by structural                           



                          or functional                           



                          abnormalities of the                           



                          kidney, manifested by                           



                          either:Pathological                           



                          abnormalities or                           



                          Markers of kidney                           



                          damage (including                           



                          abnormalities in the                           



                          composition of the                           



                          blood or urine or                           



                          abnormalities in                           



                          imaging tests).                           

 

             Lab Interpretation Abnormal                               



             (test code = 33589-3)                                        



[facility]





             Test Item    Value        Reference Range Interpretation Comments

 

             APPEARANCE (test code = Clear        Clear                     



             8119713565)                                         

 

             COLOR (test code = Straw        Yellow       A            



             7103944653)                                         

 

             PH (test code =              4.8-8.0                   



             1646425874)                                         

 

             SP GRAVITY (test code =              1.003-1.030               



             1656728019)                                         

 

             GLU U QUAL (test code = Normal       Normal                    



             5659245186)                                         

 

             BLOOD (test code = Negative     Negative                  



             2982089142)                                         

 

             KETONES (test code = Negative     Negative                  



             7968466420)                                         

 

             PROTEIN (test code = Negative     Negative                  



             2887-8)                                             

 

             UROBILIN (test code = Normal       Normal                    



             5354585050)                                         

 

             BILIRUBIN (test code = Negative     Negative                  



             3965788871)                                         

 

             NITRITE (test code = Negative     Negative                  



             4188284440)                                         

 

             LEUK OZZY (test code = Negative     Negative                  



             5495846222)                                         

 

             RBC/HPF (test code =              See_Comment                [Autom

ated message]



             5917645307)                                         The system [employer]



                                                                 generated this 

result



                                                                 transmitted ref

erence



                                                                 range: 0 - 3 HP

F. The



                                                                 reference range

 was



                                                                 not used to int

erpret



                                                                 this result as



                                                                 normal/abnormal

.

 

             WBC/HPF (test code =              See_Comment                [Autom

ated message]



             4758677067)                                         The system [employer]



                                                                 generated this 

result



                                                                 transmitted ref

erence



                                                                 range: 0 - 5 HP

F. The



                                                                 reference range

 was



                                                                 not used to int

erpret



                                                                 this result as



                                                                 normal/abnormal

.

 

             BACTERIA (test code = Negative     Negative                  



             8705258692)                                         

 

             SQ EPITH (test code =              HPF                       



             0366500611)                                         

 

             Lab Interpretation (test Abnormal                               



             code = 41264-6)                                        



[facility]





             Test Item    Value        Reference Range Interpretation Comments

 

             LIPASE (test code = 2018472673) 51 U/L       0-220                 

    

 

             Lab Interpretation (test code = Normal                             

    



             65331-8)                                            



[facility] WITH [id number] 21:22:00





             Test Item    Value        Reference Range Interpretation Comments

 

             WBC (test code =              See_Comment                [Automated

 message]



             6690-2)                                             The system [employer]



                                                                 generated this 

result



                                                                 transmitted ref

erence



                                                                 range: 4.30 - 1

1.10



                                                                 10*3/?L. The re

ference



                                                                 range was not u

sed to



                                                                 interpret this 

result



                                                                 as normal/abnor

mal.

 

             RBC (test code =              See_Comment                [Automated

 message]



             789-8)                                              The system [employer]



                                                                 generated this 

result



                                                                 transmitted ref

erence



                                                                 range: 3.93 - 5

.25



                                                                 10*6/?L. The re

ference



                                                                 range was not u

sed to



                                                                 interpret this 

result



                                                                 as normal/abnor

mal.

 

             HGB (test code = 12.5 g/dL    11.6-15                   



             718-7)                                              

 

             HCT (test code = 36.6 %       35.7-45.2                 



             4544-3)                                             

 

             MCV (test code = 90.8 fL      80.6-95.5                 



             787-2)                                              

 

             MCH (test code = 31.0 pg      25.9-32.8                 



             785-6)                                              

 

             MCHC (test code = 34.2 g/dL    31.6-35.1                 



             786-4)                                              

 

             RDW-SD (test code 39.9 fL      39-49.9                   



             = 94293-9)                                          

 

             RDW-CV (test code 12.0 %       12-15.5                   



             = 788-0)                                            

 

             PLT (test code =              See_Comment                [Automated

 message]



             437-3)                                              The system [employer]



                                                                 generated this 

result



                                                                 transmitted ref

erence



                                                                 range: 166 - 35

8



                                                                 10*3/?L. The re

ference



                                                                 range was not u

sed to



                                                                 interpret this 

result



                                                                 as normal/abnor

mal.

 

             MPV (test code = 10.2 fL      9.5-12.9                  



             87271-9)                                            

 

             NRBC/100 WBC (test              See_Comment                [Automat

ed message]



             code = 4999554928)                                        The [employer]

[employer] which



                                                                 generated this 

result



                                                                 transmitted ref

erence



                                                                 range: 0.0 - 10

.0 /100



                                                                 WBCs. The refer

ence



                                                                 range was not u

sed to



                                                                 interpret this 

result



                                                                 as normal/abnor

mal.

 

             NRBC x10^3 (test <0.01        See_Comment                [Automated

 message]



             code = 9671549526)                                        The syste

m which



                                                                 generated this 

result



                                                                 transmitted ref

erence



                                                                 range: 10*3/?L.

 The



                                                                 reference range

 was not



                                                                 used to interpr

et this



                                                                 result as



                                                                 normal/abnormal

.

 

             GRAN MAT (NEUT) % 58.1 %                                 



             (test code =                                        



             770-8)                                              

 

             IMM GRAN % (test 0.30 %                                 



             code = 0844594900)                                        

 

             LYMPH % (test code 30.8 %                                 



             = 736-9)                                            

 

             MONO % (test code 9.2 %                                  



             = 5905-5)                                           

 

             EOS % (test code = 1.1 %                                  



             713-8)                                              

 

             BASO % (test code 0.5 %                                  



             = 706-2)                                            

 

             GRAN MAT     3.77 10*3/uL 1.88-7.09                 



             x10^3(ANC) (test                                        



             code = 1947142151)                                        

 

             IMM GRAN x10^3 <0.03        0-0.06                    



             (test code =                                        



             3104150214)                                         

 

             LYMPH x10^3 (test 2.00 10*3/uL 1.32-3.29                 



             code = 731-0)                                        

 

             MONO x10^3 (test 0.60 10*3/uL 0.33-0.92                 



             code = 742-7)                                        

 

             EOS x10^3 (test 0.07 10*3/uL 0.03-0.39                 



             code = 711-2)                                        

 

             BASO x10^3 (test 0.03 10*3/uL 0.01-0.07                 



             code = 704-7)                                        



[id number] 20:51:00





             Test Item    Value        Reference Range Interpretation Comments

 

             POCT PREG (test code = 1605) positive                              

 

 

             POCT PREG LOT # (test code = 3575) [id number]                      

       

 

             POCT PREG TEST  DATE (test 2021                        

     



             code = 3576)                                        

 

             Lab Interpretation (test code = Normal                             

    



             03852-7)                                            



[facility] Abdomen and Pelvis w/ [id number] 
10:21:19Patient:   TOBIN KOHLI                             MRN:    
[id number] Date/Time201810:00 CDTReason for ExamAbdominal painReportCT 
OF THE ABDOMEN AND PELVIS WITH  CONTRASTLocation R 16HISTORY: Abdominal 
painTECHNIQUE:5 millimeters contrast  enhanced axial images of the abdomen and 
pelvis provided in venous delays.  No PO contrast was administered.   The images
were reviewed in soft tissue, lung and bone windows. Sagittal and coronal images
were reformatted. One or more the followingdose reduction techniques is 
utilized: Use of iterative reconstruction, automated exposure control, a
djustment of the mAs and Kv for the patient's weight. .1mGy-cm.  
Estimated dose savings 29%.COMPARISON:  None.CT abdomen and pelvis dated 
2018FINDINGS:Lung Bases:  No significant abnormality.Abdomen 
findings:Liver: No significant abnormality.Gallbladder: No significant 
abnormality.Pancreas: No significant abnormality.Spleen: No significant 
abnormality.Kidneys: No significant abnormality.Adrenals: No significant 
abnormality.Bowel: No significant abnormality. No dilated bowel loops or areas 
of bowel wall thickening.Appendix: Well identified and normal.Pelvis 
findings:Bladder: No significant abnormality.Uterus: No significant 
abnormality.Adnexal regions: No significant abnormality.Osseous: Note of a 
partially sacralized L5 on the right. Osseous structures are otherwise 
unremarkable. Vascular: Vascular structures enhance normally.Lymph nodes: No 
evidence of lymphadenopathy in the abdomenand pelvis.IMPRESSION:Exam 
Date/Time2018 10:00 CDTReportNo acute intra-abdominal findings. Normal right
lower quadrant appendix. No free air or free fluid.***** Final *****Dictated by:
   MD Gilbert Eniola FDictated DT/TM: 2018 10:16 amSigned by:  
MD Gilbert Eniola FSigned (Electronic Signature):  2018 10:21 am
[id number]&amp;PELVIS W/[id number] 06:43:42CT OF THE ABDOMEN AND PELVIS WITH 
CONTRASTHISTORY: PainTECHNIQUE:5 millimeters contrast  enhanced axial images of 
the abdomen and pelviswere performed with     venous delays. The images were 
reviewed in softtissue, lung and bone windows. 2 mm coronal images were 
reformatted. 100mL of Isovue 370 is given IV. The GFR is greater than 
60COMPARISON:  CT abdomen pelvis, 2017FINDINGS:Abdomen findings:    The 
liver, adrenals, spleen, pancreas, gallbladder, kidneys and lungwindows are 
unremarkable.Pelvis findings:    The distal ureters, bladder   , uterus, adnexa 
, appendix andremainder of the bowel are unremarkable. Bone windows are 
normal.IMPRESSION:Unremarkable exam. No acute findings.Urinalysis Complete
2017 05:48:00





             Test Item    Value        Reference Range Interpretation Comments

 

             Color (test code = Ashley        Yellow,Straw,Pl A            



             COLOR)                    yellow                    

 

             Clarity (test code = Clear        Clear        N            



             CLAR)                                               

 

             Specific Gravity (test 1.032        1.001-1.035  N            



             code = SPGR)                                        

 

             pH (test code = PH) 5.0          5.0-9.0      N            

 

             Ketone (test code = 5 mg/dL      Negative     A            



             KET)                                                

 

             Glucose (test code = Negative mg/dL Negative     N            



             GLUCUR)                                             

 

             Protein (test code = 75 mg/dL     Negative     A            



             PROT)                                               

 

             Bilirubin (test code = See IctoTest mg/dL Negative     A           

 



             BILI)                                               

 

             Occult Blood (test code Large        Negative     A            



             = UDOB)                                             

 

             Urobilinogen (test code 1.0 mg/dL    0.2-1.0      N            



             = UROB)                                             

 

             Nitrite (test code = Negative     Negative     N            



             NIT)                                                

 

             Leuk Esterase (test Small        Negative     A            



             code = LEUK)                                        

 

             Ictotest (test code = Confirmed Negative Negative,Confirmed N      

      



             ICTOTEST)                 Negative                  

 

             Micros Exam (test code Indicated                              



             = MEXAM)                                            

 

             Epithelial Cells (test 15-19 /LPF   0-30         A            



             code = EPI)                                         

 

             WBC, Urine (test code = 2-5 /HPF     0-5          A            



             UWBC)                                               

 

             RBC, Urine (test code = None Seen /HPF 0-5          A            



             URBC)                                               

 

             Bacteria (test code = Few /HPF                               



             BACT)                                               



CBC with [id number] 05:42:00





             Test Item    Value        Reference Range Interpretation Comments

 

             WBC (test code = WBC) 7.1 K/cumm   4.4-10.5     N            

 

             RBC (test code = RBC) 4.36 M/cumm  3.75-5.20    N            

 

             Hemoglobin (test code = HGB) 12.8 gm/dL   12.2-14.8    N           

 

 

             Hematocrit (test code = HCT) 38.2 %       36.5-44.4    N           

 

 

             MCV (test code = MCV) 87.7 fL             N            

 

             MCH (test code = MCH) 29.4 pg      27.0-32.5    N            

 

             MCHC (test code = MCHC) 33.5 g/dL    32.0-37.5    N            

 

             RDW (test code = RDW) 14.6 %       11.5-14.5    H            

 

             Platelet Count (test code = 269 K/cumm   140-440      N            



             PLTCT)                                              

 

             MPV (test code = MPV) 8.4 fL                                 

 

             Diff Method (test code = DIFFM) Auto                               

    

 

             Neutrophil (test code = NEUT) 51.1 %       36-70        N          

  

 

             Lymphocyte (test code = LYMPH) 36.0 %       12-44        N         

   

 

             Monocyte (test code = MONO) 9.0 %        0-11         N            

 

             Eosinophil (test code = EOS) 3.3 %        0-7          N           

 

 

             Basophil (test code = BASO) 0.5 %        0-2          N            

 

             Neutro Abs (test code = ANEUT) 3.6 K/cumm   1.6-7.4      N         

   

 

             Lymph Abs (test code = ALYMPH) 2.6 K/cumm   0.5-4.6      N         

   

 

             Mono Abs (test code = AMONO) 0.6 K/cumm   0.0-1.2      N           

 

 

             Eos Abs (test code = AEOS) 0.24 K/cumm  0.00-0.74    N            

 

             Baso Abs (test code = ABASO) 0.0 K/cumm   0.00-0.21    N           

 



BHCG, Serum, [id number] 05:28:00





             Test Item    Value        Reference Range Interpretation Comments

 

             Preg Qual [Se] (test code = BSHCG) Negative     Negative     N     

       



Comprehensive Metabolic [id number] 05:28:00





             Test Item    Value        Reference Range Interpretation Comments

 

             Sodium (test code = 141 mmol/L   135-145      N            



             NA)                                                 

 

             Potassium (test 3.8 mmol/L   3.5-5.1      N            



             code = K)                                           

 

             Chloride (test code 104 mmol/L          N            



             = CL)                                               

 

             Carbon Dioxide 22 mmol/L    22-29        N            



             (test code = CO2)                                        

 

             Glucose (test code 86 mg/dL            N            



             = GLU)                                              

 

             Blood Urea Nitrogen 17 mg/dL     6-20         N            



             (test code = BUN)                                        

 

             Creatinine (test 0.9 mg/dL    0.5-0.9      N            



             code = CREAT)                                        

 

             Calcium (test code 9.2 mg/dL    8.3-10.5     N            



             = CA)                                               

 

             Prot Total (test 7.6 g/dL     6.4-8.3      N            



             code = TP)                                          

 

             Albumin (test code 4.4 g/dL     3.5-5.2      N            



             = ALB)                                              

 

             A/G Ratio (test 1.4 Ratio                              



             code = AGRATIO)                                        

 

             Globulin (test code 3.2          2.9-3.1      H            



             = GLOB)                                             

 

             Bili Total (test 0.5 mg/dL    0.1-0.9      N            



             code = TBIL)                                        

 

             Alk Phos (test code 48 U/L              N            



             = APHOS)                                            

 

             AST (test code = 17 U/L       1-32         N            



             AST)                                                

 

             ALT (test code = 15 U/L       1-33         N            



             ALT)                                                

 

             BUN/Creatinine 18.9                                   



             Ratio (test code =                                        



             BCRATIO)                                            

 

             Anion Gap (test 15 mmol/L    7-16         N            



             code = AGAP)                                        

 

             Estimated GFR (test >60                                    eGFR (es

timated



             code = GFR)  mL/min/1.73m2                           Glomerular Caden

tration



                                                                 Rate) is an est

imated



                                                                 value,calculate

d from



                                                                 the patient's s

kvng



                                                                 creatinine usin

g the



                                                                 MDRD equation.I

t is



                                                                 NOT the patient

's



                                                                 actual GFR. The

 eGFR



                                                                 provides a more



                                                                 clinicallyusefu

l



                                                                 measure of kidn

ey



                                                                 disease than se

rum



                                                                 creatinine



                                                                 alone.***This



                                                                 calculation bhaskar

es sex



                                                                 and race into



                                                                 account, if the



                                                                 informationis



                                                                 provided. If th

e race



                                                                 is not provided

, and



                                                                 the patient



                                                                 isAfrican-Ameri

can,



                                                                 multiply by 1.2

12. If



                                                                 sex is not prov

ided,



                                                                 and thepatient 

is



                                                                 female, multipl

y by



                                                                 0.742. Results 

for



                                                                 patients <18 ye

ars



                                                                 ofage have not 

been



                                                                 validated by th

e MDRD



                                                                 study and shoul

d be



                                                                 interpretedwith



                                                                 caution.eGFR Re

sult



                                                                 Interpretation:

eGFR >



                                                                 or = 60 is in t

he



                                                                 Normal RangeeGF

R < 60



                                                                 may mean kidney



                                                                 diseaseeGFR < 1

5 may



                                                                 mean kidney



                                                                 failure***Range

s



                                                                 recommended by 

the



                                                                 [employer]



                                                                 Foundation,http

://nkd



                                                                 ep.nih.gov



[id number] 05:24:00





             Test Item    Value        Reference Range Interpretation Comments

 

             Lipase (test code = LIP) 25 U/L       13-60        N            



[id number]&amp; PELVIS W/O [id number] 20:05:31LOCATION: S 17HISTORY:  
16-year-old female who presents with abdominal pain.COMMENT:     Axial CT cherry
ging of this patient's abdomen and pelvis was obtained fromthe diaphragm to the 
pelvic floor withoutIV contrast. Coronal andsagittal soft tissue reconstructions
 were included.    One or more of the following dose reduction techniques were 
used:Automated exposure control, adjustment of the mA and/or kV according 
thepatient size, and/or utilization of iterative reconstruction technique.DLP: 
851.9 mGy-cmCONTRAST: NoneFINDINGS:The visualized lung bases are clear. The 
cardiac silhouette isunremarkable.The liver, spleen, pancreas, adrenal glands, 
kidneys, and gallbladderare unremarkable in this unenhanced CT study.The upper 
intestinal tract down the small intestine are unremarkable. Anormal-appearing ap
pendix is seen. The colon is unremarkable.There is no ascites or adenopathy 
present in the abdomen or in thepelvis.In the pelvis the left ovary is enlarged 
and cystic measuring 5 cm indiameter. The uterus and right ovary are 
unremarkable and the urinarybladder is unremarkable.The vascular anatomy is un
remarkable.The musculoskeletal anatomy is unremarkable.IMPRESSION:This patient's
 left ovary is enlarged and cystic. Pelvic ultrasoundcorrelation is 
suggested.Otherwise the appearance of this patient's abdomen and pelvis in 
thisunenhanced CT study is unremarkable.Urinalysis [id number] 19:34:00





             Test Item    Value        Reference Range Interpretation Comments

 

             Color (test code = COLOR) Yellow       Yellow,Straw,Pl N           

 



                                       yellow                    

 

             Clarity (test code = Clear        Clear        N            



             CLAR)                                               

 

             Specific Gravity (test 1.023        1.001-1.035  N            



             code = SPGR)                                        

 

             pH (test code = PH) 8.0          5.0-9.0      N            

 

             Ketone (test code = KET) 15 mg/dL     Negative     A            

 

             Glucose (test code = Negative mg/dL Negative     N            



             GLUCUR)                                             

 

             Protein (test code = Negative mg/dL Negative     N            



             PROT)                                               

 

             Bilirubin (test code = Negative mg/dL Negative     N            



             BILI)                                               

 

             Occult Blood (test code = Negative     Negative     N            



             UDOB)                                               

 

             Urobilinogen (test code = 1.0 mg/dL    0.2-1.0      N            



             UROB)                                               

 

             Nitrite (test code = NIT) Negative     Negative     N            

 

             Leuk Esterase (test code Small        Negative     A            



             = LEUK)                                             

 

             Micros Exam (test code = Indicated                              



             MEXAM)                                              

 

             Epithelial Cells (test 10-14 /LPF   0-30         A            



             code = EPI)                                         

 

             WBC, Urine (test code = 0-1 /HPF     0-5          A            



             UWBC)                                               

 

             RBC, Urine (test code = None Seen /HPF 0-5          A            



             URBC)                                               

 

             Bacteria (test code = Few /HPF                               



             BACT)                                               



Comprehensive Metabolic [id number] 19:16:00





             Test Item    Value        Reference Range Interpretation Comments

 

             Sodium (test code = 138 mmol/L   135-145      N            



             NA)                                                 

 

             Potassium (test 4.4 mmol/L   3.5-5.1      N            HEMOLYZED



             code = K)                                           

 

             Chloride (test code 105 mmol/L          N            



             = CL)                                               

 

             Carbon Dioxide 18 mmol/L    22-29        L            



             (test code = CO2)                                        

 

             Glucose (test code 78 mg/dL            N            



             = GLU)                                              

 

             Blood Urea Nitrogen 11 mg/dL     6-20         N            



             (test code = BUN)                                        

 

             Creatinine (test 0.7 mg/dL    0.5-0.9      N            



             code = CREAT)                                        

 

             Calcium (test code 8.8 mg/dL    8.3-10.5     N            



             = CA)                                               

 

             Prot Total (test 6.9 g/dL     6.4-8.3      N            



             code = TP)                                          

 

             Albumin (test code 4.2 g/dL     3.5-5.2      N            



             = ALB)                                              

 

             A/G Ratio (test 1.6 Ratio                              



             code = AGRATIO)                                        

 

             Globulin (test code 2.7          2.9-3.1      L            



             = GLOB)                                             

 

             Bili Total (test 0.5 mg/dL    0.1-0.9      N            



             code = TBIL)                                        

 

             Alk Phos (test code 41 U/L              N            



             = APHOS)                                            

 

             AST (test code = 33 U/L       1-32         H            HEMOLYZED



             AST)                                                

 

             ALT (test code = 20 U/L       1-33         N            



             ALT)                                                

 

             BUN/Creatinine 15.7                                   



             Ratio (test code =                                        



             BCRATIO)                                            

 

             Anion Gap (test 15 mmol/L    7-16         N            



             code = AGAP)                                        

 

             Estimated GFR (test >60                                    eGFR (es

timated



             code = GFR)  mL/min/1.73m2                           Glomerular Caden

tration



                                                                 Rate) is an est

imated



                                                                 value,calculate

d from



                                                                 the patient's s

kvng



                                                                 creatinine usin

g the



                                                                 MDRD equation.I

t is



                                                                 NOT the patient

's



                                                                 actual GFR. The

 eGFR



                                                                 provides a more



                                                                 clinicallyusefu

l



                                                                 measure of kidn

ey



                                                                 disease than se

rum



                                                                 creatinine



                                                                 alone.***This



                                                                 calculation bhaskar

es sex



                                                                 and race into



                                                                 account, if the



                                                                 informationis



                                                                 provided. If th

e race



                                                                 is not provided

, and



                                                                 the patient



                                                                 isAfrican-Ameri

can,



                                                                 multiply by 1.2

12. If



                                                                 sex is not prov

ided,



                                                                 and thepatient 

is



                                                                 female, multipl

y by



                                                                 0.742. Results 

for



                                                                 patients <18 ye

ars



                                                                 ofage have not 

been



                                                                 validated by th

e MDRD



                                                                 study and joselin brooks be



                                                                 interpretedwith



                                                                 caution.eGFR Re

sult



                                                                 Interpretation:

eGFR >



                                                                 or = 60 is in t

he



                                                                 Normal RangeeGF

R < 60



                                                                 may mean kidney



                                                                 diseaseeGFR < 1

5 may



                                                                 mean kidney



                                                                 failure***Range

s



                                                                 recommended by 

the



                                                                 [employer]



                                                                 Foundation,http

://nkd



                                                                 ep.nih.gov



[id number] 19:16:00





             Test Item    Value        Reference Range Interpretation Comments

 

             Lipase (test code = LIP) 24 U/L       13-60        N            



BHCG, Serum, [id number] 19:10:00





             Test Item    Value        Reference Range Interpretation Comments

 

             Preg Qual [Se] (test code = BSHCG) Negative     Negative     N     

       



CBC with [id number] 18:53:00





             Test Item    Value        Reference Range Interpretation Comments

 

             WBC (test code = WBC) 8.1 K/cumm   4.4-10.5     N            

 

             RBC (test code = RBC) 4.00 M/cumm  3.75-5.20    N            

 

             Hemoglobin (test code = HGB) 12.3 gm/dL   12.2-14.8    N           

 

 

             Hematocrit (test code = HCT) 34.4 %       36.5-44.4    L           

 

 

             MCV (test code = MCV) 86.1 fL             N            

 

             MCH (test code = MCH) 30.7 pg      27.0-32.5    N            

 

             MCHC (test code = MCHC) 35.6 g/dL    32.0-37.5    N            

 

             RDW (test code = RDW) 12.8 %       11.5-14.5    N            

 

             Platelet Count (test code = 184 K/cumm   140-440      N            



             PLTCT)                                              

 

             MPV (test code = MPV) 7.8 fL                                 

 

             Diff Method (test code = DIFFM) Auto                               

    

 

             Neutrophil (test code = NEUT) 70.5 %       36-70        H          

  

 

             Lymphocyte (test code = LYMPH) 22.6 %       12-44        N         

   

 

             Monocyte (test code = MONO) 4.2 %        0-11         N            

 

             Eosinophil (test code = EOS) 2.4 %        0-7          N           

 

 

             Basophil (test code = BASO) 0.3 %        0-2          N            

 

             Neutro Abs (test code = ANEUT) 5.7 K/cumm   1.6-7.4      N         

   

 

             Lymph Abs (test code = ALYMPH) 1.8 K/cumm   0.5-4.6      N         

   

 

             Mono Abs (test code = AMONO) 0.3 K/cumm   0.0-1.2      N           

 

 

             Eos Abs (test code = AEOS) 0.19 K/cumm  0.00-0.74    N            

 

             Baso Abs (test code = ABASO) 0.0 K/cumm   0.00-0.21    N           

 



Urinalysis [id number] 18:49:00





             Test Item    Value        Reference Range Interpretation Comments

 

             Color (test code = Ashley        Yellow,Straw,Pl A            



             COLOR)                    yellow                    

 

             Clarity (test code = Sl Cloudy    Clear        A            



             CLAR)                                               

 

             Specific Gravity (test 1.012        1.001-1.035  N            



             code = SPGR)                                        

 

             pH (test code = PH) 5.0          5.0-9.0      N            

 

             Ketone (test code = Negative mg/dL Negative     N            



             KET)                                                

 

             Glucose (test code = Negative mg/dL Negative     N            



             GLUCUR)                                             

 

             Protein (test code = 75 mg/dL     Negative     A            



             PROT)                                               

 

             Bilirubin (test code = See IctoTest mg/dL Negative     A           

 



             BILI)                                               

 

             Occult Blood (test code Large        Negative     A            



             = UDOB)                                             

 

             Urobilinogen (test code 1.0 mg/dL    0.2-1.0      N            



             = UROB)                                             

 

             Nitrite (test code = Positive     Negative     A            



             NIT)                                                

 

             Leuk Esterase (test Large        Negative     A            



             code = LEUK)                                        

 

             Ictotest (test code = Confirmed Negative Negative,Confirmed N      

      



             ICTOTEST)                 Negative                  

 

             Micros Exam (test code Indicated                              



             = MEXAM)                                            

 

             Epithelial Cells (test 3-5 /LPF     0-30         A            



             code = EPI)                                         

 

             WBC, Urine (test code = 31-40 /HPF   0-5          A            



             UWBC)                                               

 

             RBC, Urine (test code = 3-5 /HPF     0-5          A            



             URBC)                                               

 

             Bacteria (test code = Few /HPF                               



             BACT)                                               



BHCG, Urine, [id number] 18:48:00





             Test Item    Value        Reference Range Interpretation Comments

 

             Preg Qual [Ur] (test code = HUHCG) Negative     Negative     N

## 2022-02-21 ENCOUNTER — HOSPITAL ENCOUNTER (EMERGENCY)
Dept: HOSPITAL 97 - ER | Age: 22
Discharge: HOME | End: 2022-02-21
Payer: COMMERCIAL

## 2022-02-21 VITALS — TEMPERATURE: 98.4 F | OXYGEN SATURATION: 100 % | DIASTOLIC BLOOD PRESSURE: 99 MMHG | SYSTOLIC BLOOD PRESSURE: 146 MMHG

## 2022-02-21 DIAGNOSIS — H66.91: Primary | ICD-10-CM

## 2022-02-21 DIAGNOSIS — H60.8X1: ICD-10-CM

## 2022-02-21 DIAGNOSIS — F17.210: ICD-10-CM

## 2022-02-21 DIAGNOSIS — I10: ICD-10-CM

## 2022-02-21 PROCEDURE — 99283 EMERGENCY DEPT VISIT LOW MDM: CPT

## 2022-02-21 NOTE — EDPHYS
Physician Documentation                                                                           

 [facility]                                                                 

Name: Rose Villar                                                                              

Age: 21 yrs                                                                                       

Sex: Female                                                                                       

: 2000                                                                                   

MRN: [id number]                                                                                   

Arrival Date: 2022                                                                          

Time: 16:59                                                                                       

Account#: [id number]                                                                            

Bed 9                                                                                             

Private MD:                                                                                       

ED Physician Cj Velasquez                                                                      

HPI:                                                                                              

                                                                                             

17:32 This 21 yrs old Female presents to ER via Ambulatory with complaints of Ear Pain.       kb  

17:32 The patient presents with pain, moderate. The complaints affect the right ear. Onset:   kb  

      The symptoms/episode began/occurred 3 day(s) ago, and became worse. Modifying factors:      

      The symptoms are alleviated by nothing, the symptoms are aggravated by nothing.             

      Associated signs and symptoms: The patient has no apparent associated signs or              

      symptoms. Severity of symptoms: At their worst the symptoms were moderate severe in the     

      emergency department the symptoms are unchanged. The patient has not experienced            

      similar symptoms in the past. The patient has not recently seen a physician. Pt reports     

      stinging to right ear for a few days, today the pain became severe after feeling a pop      

      in ear. States it is difficult to hear now. .                                               

                                                                                                  

OB/GYN:                                                                                           

18:18 LMP N/A -                                                                               kd3 

                                                                                                  

Historical:                                                                                       

- Allergies:                                                                                      

17:25 No Known Allergies;                                                                     ph  

- PMHx:                                                                                           

17:25 Hypertensive disorder;                                                                  ph  

- PSHx:                                                                                           

17:25  section;                                                                       ph  

                                                                                                  

- Immunization history:: Adult Immunizations unknown.                                             

- Social history:: Smoking status: Patient reports the use of cigarette tobacco                   

  products, denies chronic smoking, but will smoke occasionally.                                  

                                                                                                  

                                                                                                  

ROS:                                                                                              

17:32 Constitutional: Negative for fever, chills, and weight loss.                            kb  

17:32 ENT: Positive for ear pain.                                                                 

17:32 All other systems are negative.                                                             

                                                                                                  

Exam:                                                                                             

17:32 Constitutional:  This is a well developed, well nourished patient who is awake, alert,  kb  

      and in no acute distress. Head/Face:  Normocephalic, atraumatic. Cardiovascular:            

      Regular rate and rhythm with a normal S1 and S2.  No gallops, murmurs, or rubs.  No         

      pulse deficits. Respiratory:  Respirations even and unlabored. No increased work of         

      breathing. Talking in full sentences Skin:  Warm, dry with normal turgor.  Normal           

      color. MS/ Extremity:  Pulses equal, no cyanosis.  Neurovascular intact.  Full, normal      

      range of motion. Neuro:  Awake and alert, GCS 15, oriented to person, place, time, and      

      situation. Moves all extremities. Normal gait. Psych:  Awake, alert, with orientation       

      to person, place and time.  Behavior, mood, and affect are within normal limits.            

17:32 ENT: External ear(s): are unremarkable, Ear canal(s): erythema, that is severe, of the      

      right canal, TM's: bulging, on the right.                                                   

                                                                                                  

Vital Signs:                                                                                      

17:23  / 99; Pulse 86; Resp 18; Temp 98.4; Pulse Ox 100% on R/A;                        ph  

                                                                                                  

MDM:                                                                                              

17:29 Patient medically screened.                                                             kb  

17:32 Data reviewed: vital signs, nurses notes. Data interpreted: Pulse oximetry: on room air kb  

      is 100 %. Interpretation: normal. Counseling: I had a detailed discussion with the          

      patient and/or guardian regarding: the historical points, exam findings, and any            

      diagnostic results supporting the discharge/admit diagnosis, the need for outpatient        

      follow up, a family practitioner, to return to the emergency department if symptoms         

      worsen or persist or if there are any questions or concerns that arise at home.             

                                                                                                  

Administered Medications:                                                                         

17:37 Drug: HYDROcodone-acetaminophen 5 mg-325 mg 1 tabs Route: PO;                           ke1 

18:19 Follow up: Response: No adverse reaction                                                kd3 

                                                                                                  

                                                                                                  

Disposition Summary:                                                                              

22 17:34                                                                                    

Discharge Ordered                                                                                 

      Location: Home                                                                          kb  

      Condition: Stable                                                                       kb  

      Diagnosis                                                                                   

        - Otitis media, unspecified, right ear                                                kb  

        - Other otitis externa, right ear                                                     kb  

      Followup:                                                                               kb  

        - With: Emergency Department                                                               

        - When: As needed                                                                          

        - Reason: Worsening of condition                                                           

      Followup:                                                                               kb  

        - With: Private Physician                                                                  

        - When: 2 - 3 days                                                                         

        - Reason: Recheck today's complaints, Continuance of care, Re-evaluation by your           

      physician                                                                                   

      Discharge Instructions:                                                                     

        - Discharge Summary Sheet                                                             kb  

        - Otitis Externa, Easy-to-Read                                                        kb  

        - Otitis Media, Adult, Easy-to-Read                                                   kb  

        - Ear Drops, Adult, Easy-to-Read                                                      kb  

      Forms:                                                                                      

        - Medication Reconciliation Form                                                      kb  

        - Thank You Letter                                                                    kb  

        - Antibiotic Education                                                                kb  

        - Prescription Opioid Use                                                             kb  

      Prescriptions:                                                                              

        - Amoxicillin 875 mg Oral Tablet                                                           

            - take 1 tablet by ORAL route every 12 hours for 10 days; 20 tablet; Refills: 0,  kb  

      Product Selection Permitted                                                                 

        - Ciprodex 0.3-0.1 % Otic Drops, Suspension                                                

            - instill 4 drops by OTIC route every 12 hours for 7 days , for ears ONLY; 1      kb  

      Container; Refills: 0, Product Selection Permitted                                          

Signatures:                                                                                       

Lissy Em, Sandy Sanchez, RN                      RN   ph                                                   

Chalo Gallagher RN                   RN   ke1                                                  

Jessica Price RN   kd3                                                  

                                                                                                  

**************************************************************************************************

## 2022-02-21 NOTE — ER
Nurse's Notes                                                                                     

 [facility]                                                                 

Name: Rose Villar                                                                              

Age: 21 yrs                                                                                       

Sex: Female                                                                                       

: 2000                                                                                   

MRN: [id number]                                                                                   

Arrival Date: 2022                                                                          

Time: 16:59                                                                                       

Account#: [id number]                                                                            

Bed 9                                                                                             

Private MD:                                                                                       

Diagnosis: Otitis media, unspecified, right ear;Other otitis externa, right ear                   

                                                                                                  

Presentation:                                                                                     

                                                                                             

17:23 Chief complaint: Patient states: R ear pain that began after coughing, states, " It was ph  

      fine, then I coughed and it popped. Now it really hurts and I can't hear." Pt tearful       

      in triage, states that she was seen at [facility] today for cough, covid test pending.            

      Coronavirus screen: Vaccine status: Patient reports being unvaccinated. Ebola Screen:       

      No symptoms or risks identified at this time. Initial Sepsis Screen: Does the patient       

      meet any 2 criteria? No. Patient's initial sepsis screen is negative. Does the patient      

      have a suspected source of infection? No. Patient's initial sepsis screen is negative.      

      Risk Assessment: Do you want to hurt yourself or someone else? Patient reports no           

      desire to harm self or others. Onset of symptoms was 2022.                     

17:23 Method Of Arrival: Ambulatory                                                             

17:23 Acuity: BETTY 4                                                                           ph  

                                                                                                  

Triage Assessment:                                                                                

18:18 General: Appears uncomfortable, Behavior is calm, cooperative. Pain: Complains of pain  kd3 

      in right ear. EENT: Reports pain in right ear. Neuro: No deficits noted.                    

      Cardiovascular: No deficits noted. Respiratory: No deficits noted. GI: No deficits          

      noted. : No deficits noted. Derm: No deficits noted. Musculoskeletal: No deficits         

      noted.                                                                                      

                                                                                                  

OB/GYN:                                                                                           

18:18 LMP N/A -                                                                               kd3 

                                                                                                  

Historical:                                                                                       

- Allergies:                                                                                      

17:25 No Known Allergies;                                                                     ph  

- PMHx:                                                                                           

17:25 Hypertensive disorder;                                                                  ph  

- PSHx:                                                                                           

17:25  section;                                                                       ph  

                                                                                                  

- Immunization history:: Adult Immunizations unknown.                                             

- Social history:: Smoking status: Patient reports the use of cigarette tobacco                   

  products, denies chronic smoking, but will smoke occasionally.                                  

                                                                                                  

                                                                                                  

Screenin:17 Abuse screen: Denies threats or abuse. Denies injuries from another. Nutritional        kd3 

      screening: No deficits noted. Tuberculosis screening: No symptoms or risk factors           

      identified. Fall Risk None identified.                                                      

                                                                                                  

Vital Signs:                                                                                      

17:23  / 99; Pulse 86; Resp 18; Temp 98.4; Pulse Ox 100% on R/A;                        ph  

                                                                                                  

ED Course:                                                                                        

16:59 Patient arrived in ED.                                                                  mr  

17:25 Triage completed.                                                                       ph  

17:25 Arm band placed on.                                                                     ph  

17:27 Jessica Price, RN is Primary Nurse.                                                    kd3 

17:29 [facility]                                                        kb  

17:29 Cj Velasquez MD is Attending Physician.                                             kb  

18:17 Patient has correct armband on for positive identification. Bed in low position. Call   kd3 

      light in reach. Side rails up X2.                                                           

18:17 No provider procedures requiring assistance completed. Patient did not have IV access   kd3 

      during this emergency room visit.                                                           

                                                                                                  

Administered Medications:                                                                         

17:37 Drug: HYDROcodone-acetaminophen 5 mg-325 mg 1 tabs Route: PO;                           ke1 

18:19 Follow up: Response: No adverse reaction                                                kd3 

                                                                                                  

                                                                                                  

Outcome:                                                                                          

17:34 Discharge ordered by MD.                                                                kb  

18:17 Discharged to home ambulatory.                                                          kd3 

18:17 Condition: stable                                                                           

18:17 Discharge instructions given to patient.                                                    

18:19 Patient left the ED.                                                                    kd3 

                                                                                                  

Signatures:                                                                                       

Lissy Em FNP-C FNP-Darryl                                                   

Meghan LoraSandy RN                      RN                                                      

Jessica Price, RN                      RN   kd3                                                  

Chalo Gallagher RN                   RN   ke1                                                  

                                                                                                  

**************************************************************************************************